# Patient Record
Sex: FEMALE | Race: WHITE | NOT HISPANIC OR LATINO | Employment: OTHER | URBAN - NONMETROPOLITAN AREA
[De-identification: names, ages, dates, MRNs, and addresses within clinical notes are randomized per-mention and may not be internally consistent; named-entity substitution may affect disease eponyms.]

---

## 2017-03-06 ENCOUNTER — TRANSCRIBE ORDERS (OUTPATIENT)
Dept: ADMINISTRATIVE | Facility: HOSPITAL | Age: 67
End: 2017-03-06

## 2017-03-06 DIAGNOSIS — Z12.31 ENCOUNTER FOR SCREENING MAMMOGRAM FOR MALIGNANT NEOPLASM OF BREAST: Primary | ICD-10-CM

## 2017-03-10 ENCOUNTER — HOSPITAL ENCOUNTER (OUTPATIENT)
Dept: MAMMOGRAPHY | Facility: HOSPITAL | Age: 67
Discharge: HOME | End: 2017-03-10
Admitting: INTERNAL MEDICINE

## 2017-03-10 DIAGNOSIS — Z12.31 ENCOUNTER FOR SCREENING MAMMOGRAM FOR MALIGNANT NEOPLASM OF BREAST: ICD-10-CM

## 2017-03-10 PROCEDURE — G0202 SCR MAMMO BI INCL CAD: HCPCS

## 2017-03-10 PROCEDURE — 77063 BREAST TOMOSYNTHESIS BI: CPT

## 2017-06-19 RX ORDER — HYDROCODONE BITARTRATE AND ACETAMINOPHEN 7.5; 325 MG/1; MG/1
TABLET ORAL
Refills: 0 | COMMUNITY
Start: 2017-05-19 | End: 2017-06-19 | Stop reason: SDUPTHER

## 2017-06-19 RX ORDER — HYDROCODONE BITARTRATE AND ACETAMINOPHEN 7.5; 325 MG/1; MG/1
1 TABLET ORAL 3 TIMES DAILY PRN
Qty: 90 TABLET | Refills: 0 | Status: SHIPPED | OUTPATIENT
Start: 2017-06-19 | End: 2017-07-18 | Stop reason: SDUPTHER

## 2017-06-19 RX ORDER — HYDROCODONE BITARTRATE AND ACETAMINOPHEN 7.5; 325 MG/1; MG/1
1 TABLET ORAL 3 TIMES DAILY PRN
Qty: 90 TABLET | Refills: 0 | Status: SHIPPED | OUTPATIENT
Start: 2017-06-19 | End: 2017-06-19 | Stop reason: SDUPTHER

## 2017-07-18 RX ORDER — HYDROCODONE BITARTRATE AND ACETAMINOPHEN 7.5; 325 MG/1; MG/1
1 TABLET ORAL 3 TIMES DAILY PRN
Qty: 90 TABLET | Refills: 0 | Status: SHIPPED | OUTPATIENT
Start: 2017-07-18 | End: 2017-08-18 | Stop reason: SDUPTHER

## 2017-08-18 RX ORDER — HYDROCODONE BITARTRATE AND ACETAMINOPHEN 7.5; 325 MG/1; MG/1
1 TABLET ORAL 3 TIMES DAILY PRN
Qty: 90 TABLET | Refills: 0 | Status: SHIPPED | OUTPATIENT
Start: 2017-08-18 | End: 2017-09-19 | Stop reason: SDUPTHER

## 2017-09-05 ENCOUNTER — TELEPHONE (OUTPATIENT)
Dept: INTERNAL MEDICINE | Age: 67
End: 2017-09-05

## 2017-09-05 DIAGNOSIS — E78.2 MIXED HYPERLIPIDEMIA: Primary | ICD-10-CM

## 2017-09-05 DIAGNOSIS — Z11.59 ENCOUNTER FOR HEPATITIS C SCREENING TEST FOR LOW RISK PATIENT: ICD-10-CM

## 2017-09-06 DIAGNOSIS — Z11.59 ENCOUNTER FOR HEPATITIS C SCREENING TEST FOR LOW RISK PATIENT: ICD-10-CM

## 2017-09-06 DIAGNOSIS — E78.2 MIXED HYPERLIPIDEMIA: ICD-10-CM

## 2017-09-06 LAB
ALBUMIN SERPL-MCNC: 4.5 G/DL (ref 3.5–5.2)
ALP BLD-CCNC: 72 U/L (ref 35–104)
ALT SERPL-CCNC: 10 U/L (ref 5–33)
ANION GAP SERPL CALCULATED.3IONS-SCNC: 18 MMOL/L (ref 7–19)
AST SERPL-CCNC: 20 U/L (ref 5–32)
BILIRUB SERPL-MCNC: 0.4 MG/DL (ref 0.2–1.2)
BUN BLDV-MCNC: 11 MG/DL (ref 8–23)
CALCIUM SERPL-MCNC: 9.9 MG/DL (ref 8.8–10.2)
CHLORIDE BLD-SCNC: 96 MMOL/L (ref 98–111)
CHOLESTEROL, TOTAL: 202 MG/DL (ref 160–199)
CO2: 25 MMOL/L (ref 22–29)
CREAT SERPL-MCNC: 0.7 MG/DL (ref 0.5–0.9)
GFR NON-AFRICAN AMERICAN: >60
GLUCOSE BLD-MCNC: 88 MG/DL (ref 74–109)
HCT VFR BLD CALC: 33.5 % (ref 37–47)
HDLC SERPL-MCNC: 85 MG/DL (ref 65–121)
HEMOGLOBIN: 10.8 G/DL (ref 12–16)
HEPATITIS C ANTIBODY INTERPRETATION: NORMAL
LDL CHOLESTEROL CALCULATED: 98 MG/DL
MCH RBC QN AUTO: 29.3 PG (ref 27–31)
MCHC RBC AUTO-ENTMCNC: 32.2 G/DL (ref 33–37)
MCV RBC AUTO: 91 FL (ref 81–99)
PDW BLD-RTO: 13.6 % (ref 11.5–14.5)
PLATELET # BLD: 328 K/UL (ref 130–400)
PMV BLD AUTO: 10.3 FL (ref 9.4–12.3)
POTASSIUM SERPL-SCNC: 4.1 MMOL/L (ref 3.5–5)
RBC # BLD: 3.68 M/UL (ref 4.2–5.4)
SODIUM BLD-SCNC: 139 MMOL/L (ref 136–145)
TOTAL PROTEIN: 7.8 G/DL (ref 6.6–8.7)
TRIGL SERPL-MCNC: 96 MG/DL (ref 150–199)
TSH SERPL DL<=0.05 MIU/L-ACNC: 4 UIU/ML (ref 0.27–4.2)
WBC # BLD: 7.3 K/UL (ref 4.8–10.8)

## 2017-09-07 RX ORDER — ATORVASTATIN CALCIUM 40 MG/1
40 TABLET, FILM COATED ORAL DAILY
COMMUNITY
End: 2018-10-11 | Stop reason: SDUPTHER

## 2017-09-07 RX ORDER — HYDROXYZINE HYDROCHLORIDE 25 MG/1
25 TABLET, FILM COATED ORAL 3 TIMES DAILY PRN
COMMUNITY
End: 2020-02-18 | Stop reason: CLARIF

## 2017-09-07 RX ORDER — MECLIZINE HYDROCHLORIDE 25 MG/1
25 TABLET ORAL 3 TIMES DAILY PRN
COMMUNITY
End: 2022-05-30 | Stop reason: ALTCHOICE

## 2017-09-07 RX ORDER — CYANOCOBALAMIN 1000 UG/ML
1000 INJECTION, SOLUTION INTRAMUSCULAR; SUBCUTANEOUS
COMMUNITY
End: 2020-12-07

## 2017-09-07 RX ORDER — FOLIC ACID 1 MG/1
1 TABLET ORAL DAILY
COMMUNITY
End: 2018-05-08 | Stop reason: SDUPTHER

## 2017-09-07 RX ORDER — DEXLANSOPRAZOLE 60 MG/1
60 CAPSULE, DELAYED RELEASE ORAL DAILY
COMMUNITY
End: 2018-05-08 | Stop reason: SDUPTHER

## 2017-09-12 ENCOUNTER — OFFICE VISIT (OUTPATIENT)
Dept: INTERNAL MEDICINE | Age: 67
End: 2017-09-12
Payer: MEDICARE

## 2017-09-12 VITALS
SYSTOLIC BLOOD PRESSURE: 118 MMHG | HEART RATE: 85 BPM | OXYGEN SATURATION: 95 % | DIASTOLIC BLOOD PRESSURE: 70 MMHG | BODY MASS INDEX: 18.22 KG/M2 | WEIGHT: 99 LBS | HEIGHT: 62 IN

## 2017-09-12 DIAGNOSIS — J41.0 SIMPLE CHRONIC BRONCHITIS (HCC): ICD-10-CM

## 2017-09-12 DIAGNOSIS — F34.1 DYSTHYMIA: Primary | ICD-10-CM

## 2017-09-12 DIAGNOSIS — J30.89 NON-SEASONAL ALLERGIC RHINITIS, UNSPECIFIED ALLERGIC RHINITIS TRIGGER: ICD-10-CM

## 2017-09-12 DIAGNOSIS — D51.8 OTHER VITAMIN B12 DEFICIENCY ANEMIA: ICD-10-CM

## 2017-09-12 DIAGNOSIS — Z87.891 PERSONAL HISTORY OF TOBACCO USE, PRESENTING HAZARDS TO HEALTH: ICD-10-CM

## 2017-09-12 DIAGNOSIS — R53.82 CHRONIC FATIGUE: ICD-10-CM

## 2017-09-12 DIAGNOSIS — J98.01 COUGH DUE TO BRONCHOSPASM: ICD-10-CM

## 2017-09-12 PROBLEM — D51.9 ANEMIA DUE TO VITAMIN B12 DEFICIENCY: Status: ACTIVE | Noted: 2017-09-12

## 2017-09-12 PROCEDURE — 3023F SPIROM DOC REV: CPT | Performed by: INTERNAL MEDICINE

## 2017-09-12 PROCEDURE — 1090F PRES/ABSN URINE INCON ASSESS: CPT | Performed by: INTERNAL MEDICINE

## 2017-09-12 PROCEDURE — 1036F TOBACCO NON-USER: CPT | Performed by: INTERNAL MEDICINE

## 2017-09-12 PROCEDURE — 99214 OFFICE O/P EST MOD 30 MIN: CPT | Performed by: INTERNAL MEDICINE

## 2017-09-12 PROCEDURE — G8427 DOCREV CUR MEDS BY ELIG CLIN: HCPCS | Performed by: INTERNAL MEDICINE

## 2017-09-12 PROCEDURE — 96372 THER/PROPH/DIAG INJ SC/IM: CPT | Performed by: INTERNAL MEDICINE

## 2017-09-12 PROCEDURE — G8400 PT W/DXA NO RESULTS DOC: HCPCS | Performed by: INTERNAL MEDICINE

## 2017-09-12 PROCEDURE — 3014F SCREEN MAMMO DOC REV: CPT | Performed by: INTERNAL MEDICINE

## 2017-09-12 PROCEDURE — 3017F COLORECTAL CA SCREEN DOC REV: CPT | Performed by: INTERNAL MEDICINE

## 2017-09-12 PROCEDURE — G8419 CALC BMI OUT NRM PARAM NOF/U: HCPCS | Performed by: INTERNAL MEDICINE

## 2017-09-12 PROCEDURE — 1123F ACP DISCUSS/DSCN MKR DOCD: CPT | Performed by: INTERNAL MEDICINE

## 2017-09-12 PROCEDURE — 4040F PNEUMOC VAC/ADMIN/RCVD: CPT | Performed by: INTERNAL MEDICINE

## 2017-09-12 PROCEDURE — G8598 ASA/ANTIPLAT THER USED: HCPCS | Performed by: INTERNAL MEDICINE

## 2017-09-12 PROCEDURE — G8926 SPIRO NO PERF OR DOC: HCPCS | Performed by: INTERNAL MEDICINE

## 2017-09-12 RX ORDER — METHYLPREDNISOLONE ACETATE 80 MG/ML
80 INJECTION, SUSPENSION INTRA-ARTICULAR; INTRALESIONAL; INTRAMUSCULAR; SOFT TISSUE ONCE
Status: COMPLETED | OUTPATIENT
Start: 2017-09-12 | End: 2017-09-12

## 2017-09-12 RX ORDER — CYANOCOBALAMIN 1000 UG/ML
1000 INJECTION INTRAMUSCULAR; SUBCUTANEOUS ONCE
Status: COMPLETED | OUTPATIENT
Start: 2017-09-12 | End: 2017-09-12

## 2017-09-12 RX ORDER — FLUTICASONE PROPIONATE 50 MCG
1 SPRAY, SUSPENSION (ML) NASAL DAILY
COMMUNITY
End: 2018-01-18 | Stop reason: SDUPTHER

## 2017-09-12 RX ORDER — LEVOCETIRIZINE DIHYDROCHLORIDE 5 MG/1
5 TABLET, FILM COATED ORAL NIGHTLY
Qty: 30 TABLET | Refills: 5 | Status: SHIPPED | OUTPATIENT
Start: 2017-09-12 | End: 2018-05-08 | Stop reason: SDUPTHER

## 2017-09-12 RX ORDER — SERTRALINE HYDROCHLORIDE 25 MG/1
25 TABLET, FILM COATED ORAL DAILY
Qty: 30 TABLET | Refills: 3 | Status: SHIPPED | OUTPATIENT
Start: 2017-09-12 | End: 2018-02-21 | Stop reason: SDUPTHER

## 2017-09-12 RX ORDER — ALBUTEROL SULFATE 90 UG/1
2 AEROSOL, METERED RESPIRATORY (INHALATION) EVERY 6 HOURS PRN
COMMUNITY
End: 2017-09-12

## 2017-09-12 ASSESSMENT — PATIENT HEALTH QUESTIONNAIRE - PHQ9
SUM OF ALL RESPONSES TO PHQ QUESTIONS 1-9: 2
SUM OF ALL RESPONSES TO PHQ9 QUESTIONS 1 & 2: 2
1. LITTLE INTEREST OR PLEASURE IN DOING THINGS: 1
2. FEELING DOWN, DEPRESSED OR HOPELESS: 1

## 2017-09-19 RX ORDER — HYDROCODONE BITARTRATE AND ACETAMINOPHEN 7.5; 325 MG/1; MG/1
1 TABLET ORAL 3 TIMES DAILY PRN
Qty: 90 TABLET | Refills: 0 | Status: SHIPPED | OUTPATIENT
Start: 2017-09-19 | End: 2017-10-16 | Stop reason: SDUPTHER

## 2017-09-20 ASSESSMENT — ENCOUNTER SYMPTOMS
ABDOMINAL PAIN: 0
WHEEZING: 0
COUGH: 1
SINUS PRESSURE: 1
EYE REDNESS: 0
SHORTNESS OF BREATH: 0
RHINORRHEA: 1

## 2017-10-13 RX ORDER — HYDROCODONE BITARTRATE AND ACETAMINOPHEN 7.5; 325 MG/1; MG/1
1 TABLET ORAL 3 TIMES DAILY PRN
Qty: 90 TABLET | Refills: 0 | OUTPATIENT
Start: 2017-10-13

## 2017-10-16 RX ORDER — HYDROCODONE BITARTRATE AND ACETAMINOPHEN 7.5; 325 MG/1; MG/1
1 TABLET ORAL 3 TIMES DAILY PRN
Qty: 90 TABLET | Refills: 0 | Status: SHIPPED | OUTPATIENT
Start: 2017-10-16 | End: 2017-11-20 | Stop reason: SDUPTHER

## 2017-10-16 NOTE — TELEPHONE ENCOUNTER
Requested Prescriptions     Pending Prescriptions Disp Refills    HYDROcodone-acetaminophen (NORCO) 7.5-325 MG per tablet 90 tablet 0     Sig: Take 1 tablet by mouth 3 times daily as needed for Pain .

## 2017-11-20 RX ORDER — HYDROCODONE BITARTRATE AND ACETAMINOPHEN 7.5; 325 MG/1; MG/1
TABLET ORAL
Qty: 90 TABLET | Refills: 0 | Status: SHIPPED | OUTPATIENT
Start: 2017-11-20 | End: 2017-12-18 | Stop reason: SDUPTHER

## 2017-12-18 RX ORDER — HYDROCODONE BITARTRATE AND ACETAMINOPHEN 7.5; 325 MG/1; MG/1
1 TABLET ORAL 3 TIMES DAILY PRN
Qty: 90 TABLET | Refills: 0 | Status: SHIPPED | OUTPATIENT
Start: 2017-12-18 | End: 2018-01-18 | Stop reason: SDUPTHER

## 2018-01-18 RX ORDER — HYDROCODONE BITARTRATE AND ACETAMINOPHEN 7.5; 325 MG/1; MG/1
1 TABLET ORAL 3 TIMES DAILY PRN
Qty: 90 TABLET | Refills: 0 | Status: SHIPPED | OUTPATIENT
Start: 2018-01-18 | End: 2018-02-17

## 2018-01-18 RX ORDER — FLUTICASONE PROPIONATE 50 MCG
SPRAY, SUSPENSION (ML) NASAL
Qty: 16 G | Refills: 5 | Status: SHIPPED | OUTPATIENT
Start: 2018-01-18 | End: 2018-12-06 | Stop reason: SDUPTHER

## 2018-02-19 RX ORDER — HYDROCODONE BITARTRATE AND ACETAMINOPHEN 7.5; 325 MG/1; MG/1
TABLET ORAL
Qty: 30 TABLET | Refills: 0 | Status: SHIPPED | OUTPATIENT
Start: 2018-02-19 | End: 2018-03-12 | Stop reason: SDUPTHER

## 2018-02-21 DIAGNOSIS — F34.1 DYSTHYMIA: ICD-10-CM

## 2018-02-21 DIAGNOSIS — J41.0 SIMPLE CHRONIC BRONCHITIS (HCC): ICD-10-CM

## 2018-02-22 RX ORDER — IPRATROPIUM/ALBUTEROL SULFATE 20-100 MCG
MIST INHALER (GRAM) INHALATION
Qty: 4 G | Refills: 3 | Status: SHIPPED | OUTPATIENT
Start: 2018-02-22 | End: 2018-06-25 | Stop reason: SDUPTHER

## 2018-02-22 RX ORDER — SERTRALINE HYDROCHLORIDE 25 MG/1
TABLET, FILM COATED ORAL
Qty: 90 TABLET | Refills: 3 | Status: SHIPPED | OUTPATIENT
Start: 2018-02-22 | End: 2019-03-26 | Stop reason: SDUPTHER

## 2018-02-23 ENCOUNTER — TELEPHONE (OUTPATIENT)
Dept: INTERNAL MEDICINE | Age: 68
End: 2018-02-23

## 2018-02-23 NOTE — TELEPHONE ENCOUNTER
The phone # we have is not a working number. I sent her a hand written letter asking her to let us know when needs new script and to try taking bid instead of tid.

## 2018-03-01 ENCOUNTER — TELEPHONE (OUTPATIENT)
Dept: INTERNAL MEDICINE | Age: 68
End: 2018-03-01

## 2018-03-01 NOTE — TELEPHONE ENCOUNTER
She is using Combivent inhaler and it works really good, but is too expensive. Is there something cheaper she can try?

## 2018-03-12 RX ORDER — HYDROCODONE BITARTRATE AND ACETAMINOPHEN 7.5; 325 MG/1; MG/1
1 TABLET ORAL 2 TIMES DAILY PRN
Qty: 60 TABLET | Refills: 0 | Status: SHIPPED | OUTPATIENT
Start: 2018-03-12 | End: 2018-04-11

## 2018-03-12 NOTE — TELEPHONE ENCOUNTER
Requested Prescriptions     Pending Prescriptions Disp Refills    HYDROcodone-acetaminophen (NORCO) 7.5-325 MG per tablet 60 tablet 0     Sig: Take 1 tablet by mouth 2 times daily as needed (prn) Take 1 q day prn.

## 2018-03-27 ENCOUNTER — OFFICE VISIT (OUTPATIENT)
Dept: INTERNAL MEDICINE | Age: 68
End: 2018-03-27
Payer: MEDICARE

## 2018-03-27 ENCOUNTER — TRANSCRIBE ORDERS (OUTPATIENT)
Dept: ADMINISTRATIVE | Facility: HOSPITAL | Age: 68
End: 2018-03-27

## 2018-03-27 VITALS
OXYGEN SATURATION: 95 % | BODY MASS INDEX: 18.4 KG/M2 | HEIGHT: 62 IN | RESPIRATION RATE: 18 BRPM | WEIGHT: 100 LBS | SYSTOLIC BLOOD PRESSURE: 120 MMHG | DIASTOLIC BLOOD PRESSURE: 62 MMHG | HEART RATE: 74 BPM

## 2018-03-27 DIAGNOSIS — D51.8 OTHER VITAMIN B12 DEFICIENCY ANEMIA: ICD-10-CM

## 2018-03-27 DIAGNOSIS — R53.82 CHRONIC FATIGUE: ICD-10-CM

## 2018-03-27 DIAGNOSIS — J30.89 CHRONIC NON-SEASONAL ALLERGIC RHINITIS, UNSPECIFIED TRIGGER: ICD-10-CM

## 2018-03-27 DIAGNOSIS — E78.00 PURE HYPERCHOLESTEROLEMIA: ICD-10-CM

## 2018-03-27 DIAGNOSIS — M15.9 PRIMARY OSTEOARTHRITIS INVOLVING MULTIPLE JOINTS: ICD-10-CM

## 2018-03-27 DIAGNOSIS — Z12.31 SCREENING MAMMOGRAM, ENCOUNTER FOR: ICD-10-CM

## 2018-03-27 DIAGNOSIS — Z79.899 LONG-TERM USE OF HIGH-RISK MEDICATION: ICD-10-CM

## 2018-03-27 DIAGNOSIS — Z12.31 ENCOUNTER FOR SCREENING MAMMOGRAM FOR MALIGNANT NEOPLASM OF BREAST: Primary | ICD-10-CM

## 2018-03-27 DIAGNOSIS — F34.1 DYSTHYMIA: Primary | ICD-10-CM

## 2018-03-27 DIAGNOSIS — Z00.00 ROUTINE GENERAL MEDICAL EXAMINATION AT A HEALTH CARE FACILITY: ICD-10-CM

## 2018-03-27 DIAGNOSIS — J41.1 MUCOPURULENT CHRONIC BRONCHITIS (HCC): ICD-10-CM

## 2018-03-27 LAB
AMPHETAMINE SCREEN, URINE: NORMAL
BARBITURATE SCREEN, URINE: NORMAL
BENZODIAZEPINE SCREEN, URINE: NORMAL
COCAINE METABOLITE SCREEN URINE: NORMAL
MDMA URINE: NORMAL
METHADONE SCREEN, URINE: NORMAL
METHAMPHETAMINE, URINE: NORMAL
OPIATE SCREEN URINE: POSITIVE
OXYCODONE SCREEN URINE: NORMAL
PHENCYCLIDINE SCREEN URINE: NORMAL
PROPOXYPHENE SCREEN, URINE: NORMAL
THC: NORMAL
TRICYCLIC ANTIDEPRESSANTS, UR: NORMAL

## 2018-03-27 PROCEDURE — G8400 PT W/DXA NO RESULTS DOC: HCPCS | Performed by: INTERNAL MEDICINE

## 2018-03-27 PROCEDURE — G8419 CALC BMI OUT NRM PARAM NOF/U: HCPCS | Performed by: INTERNAL MEDICINE

## 2018-03-27 PROCEDURE — G0439 PPPS, SUBSEQ VISIT: HCPCS | Performed by: INTERNAL MEDICINE

## 2018-03-27 PROCEDURE — 3023F SPIROM DOC REV: CPT | Performed by: INTERNAL MEDICINE

## 2018-03-27 PROCEDURE — 80305 DRUG TEST PRSMV DIR OPT OBS: CPT | Performed by: INTERNAL MEDICINE

## 2018-03-27 PROCEDURE — 1123F ACP DISCUSS/DSCN MKR DOCD: CPT | Performed by: INTERNAL MEDICINE

## 2018-03-27 PROCEDURE — 4040F PNEUMOC VAC/ADMIN/RCVD: CPT | Performed by: INTERNAL MEDICINE

## 2018-03-27 PROCEDURE — G8482 FLU IMMUNIZE ORDER/ADMIN: HCPCS | Performed by: INTERNAL MEDICINE

## 2018-03-27 PROCEDURE — G8926 SPIRO NO PERF OR DOC: HCPCS | Performed by: INTERNAL MEDICINE

## 2018-03-27 PROCEDURE — G8598 ASA/ANTIPLAT THER USED: HCPCS | Performed by: INTERNAL MEDICINE

## 2018-03-27 PROCEDURE — G8427 DOCREV CUR MEDS BY ELIG CLIN: HCPCS | Performed by: INTERNAL MEDICINE

## 2018-03-27 PROCEDURE — 3017F COLORECTAL CA SCREEN DOC REV: CPT | Performed by: INTERNAL MEDICINE

## 2018-03-27 PROCEDURE — 3014F SCREEN MAMMO DOC REV: CPT | Performed by: INTERNAL MEDICINE

## 2018-03-27 PROCEDURE — 1036F TOBACCO NON-USER: CPT | Performed by: INTERNAL MEDICINE

## 2018-03-27 PROCEDURE — 99213 OFFICE O/P EST LOW 20 MIN: CPT | Performed by: INTERNAL MEDICINE

## 2018-03-27 PROCEDURE — 1090F PRES/ABSN URINE INCON ASSESS: CPT | Performed by: INTERNAL MEDICINE

## 2018-03-27 PROCEDURE — 96372 THER/PROPH/DIAG INJ SC/IM: CPT | Performed by: INTERNAL MEDICINE

## 2018-03-27 RX ORDER — ALBUTEROL SULFATE 90 UG/1
2 AEROSOL, METERED RESPIRATORY (INHALATION) EVERY 6 HOURS PRN
Qty: 3 INHALER | Refills: 3 | Status: SHIPPED | OUTPATIENT
Start: 2018-03-27 | End: 2019-02-08 | Stop reason: SDUPTHER

## 2018-03-27 RX ORDER — CYANOCOBALAMIN 1000 UG/ML
1000 INJECTION INTRAMUSCULAR; SUBCUTANEOUS ONCE
Status: COMPLETED | OUTPATIENT
Start: 2018-03-27 | End: 2018-03-27

## 2018-03-27 RX ORDER — MONTELUKAST SODIUM 10 MG/1
10 TABLET ORAL NIGHTLY
Qty: 90 TABLET | Refills: 3 | Status: SHIPPED | OUTPATIENT
Start: 2018-03-27 | End: 2021-06-23 | Stop reason: ALTCHOICE

## 2018-03-27 RX ORDER — METHYLPREDNISOLONE ACETATE 40 MG/ML
40 INJECTION, SUSPENSION INTRA-ARTICULAR; INTRALESIONAL; INTRAMUSCULAR; SOFT TISSUE ONCE
Status: COMPLETED | OUTPATIENT
Start: 2018-03-27 | End: 2018-03-27

## 2018-03-27 RX ADMIN — CYANOCOBALAMIN 1000 MCG: 1000 INJECTION INTRAMUSCULAR; SUBCUTANEOUS at 11:34

## 2018-03-27 RX ADMIN — METHYLPREDNISOLONE ACETATE 40 MG: 40 INJECTION, SUSPENSION INTRA-ARTICULAR; INTRALESIONAL; INTRAMUSCULAR; SOFT TISSUE at 11:32

## 2018-03-27 ASSESSMENT — LIFESTYLE VARIABLES: HOW OFTEN DO YOU HAVE A DRINK CONTAINING ALCOHOL: 0
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
## 2018-03-30 ASSESSMENT — ENCOUNTER SYMPTOMS
ABDOMINAL PAIN: 0
BACK PAIN: 0
EYE REDNESS: 0
SHORTNESS OF BREATH: 0
COUGH: 1
EYE DISCHARGE: 0
ABDOMINAL DISTENTION: 0
SINUS PRESSURE: 0

## 2018-04-19 RX ORDER — HYDROCODONE BITARTRATE AND ACETAMINOPHEN 7.5; 325 MG/1; MG/1
1 TABLET ORAL EVERY 8 HOURS PRN
COMMUNITY
End: 2018-04-19 | Stop reason: SDUPTHER

## 2018-04-19 RX ORDER — HYDROCODONE BITARTRATE AND ACETAMINOPHEN 7.5; 325 MG/1; MG/1
1 TABLET ORAL EVERY 8 HOURS PRN
Qty: 90 TABLET | Refills: 0 | Status: SHIPPED | OUTPATIENT
Start: 2018-04-19 | End: 2018-06-06 | Stop reason: SDUPTHER

## 2018-04-23 ENCOUNTER — TELEPHONE (OUTPATIENT)
Dept: INTERNAL MEDICINE | Age: 68
End: 2018-04-23

## 2018-04-23 DIAGNOSIS — J34.89 NASAL LESION: ICD-10-CM

## 2018-04-23 RX ORDER — MUPIROCIN CALCIUM 20 MG/G
CREAM TOPICAL
Qty: 15 G | Refills: 0 | Status: SHIPPED | OUTPATIENT
Start: 2018-04-23 | End: 2018-05-23

## 2018-04-26 PROBLEM — Z12.31 SCREENING MAMMOGRAM, ENCOUNTER FOR: Status: RESOLVED | Noted: 2018-03-27 | Resolved: 2018-04-26

## 2018-05-01 ENCOUNTER — APPOINTMENT (OUTPATIENT)
Dept: MAMMOGRAPHY | Facility: HOSPITAL | Age: 68
End: 2018-05-01

## 2018-05-08 ENCOUNTER — HOSPITAL ENCOUNTER (OUTPATIENT)
Dept: MAMMOGRAPHY | Facility: HOSPITAL | Age: 68
Discharge: HOME | End: 2018-05-08
Admitting: INTERNAL MEDICINE

## 2018-05-08 DIAGNOSIS — Z12.31 ENCOUNTER FOR SCREENING MAMMOGRAM FOR MALIGNANT NEOPLASM OF BREAST: ICD-10-CM

## 2018-05-08 DIAGNOSIS — J30.89 NON-SEASONAL ALLERGIC RHINITIS: ICD-10-CM

## 2018-05-08 PROCEDURE — 77067 SCR MAMMO BI INCL CAD: CPT

## 2018-05-08 PROCEDURE — 77063 BREAST TOMOSYNTHESIS BI: CPT

## 2018-05-08 RX ORDER — DEXLANSOPRAZOLE 60 MG/1
CAPSULE, DELAYED RELEASE ORAL
Qty: 90 CAPSULE | Refills: 3 | Status: SHIPPED | OUTPATIENT
Start: 2018-05-08 | End: 2019-06-06 | Stop reason: SDUPTHER

## 2018-05-08 RX ORDER — LEVOCETIRIZINE DIHYDROCHLORIDE 5 MG/1
TABLET, FILM COATED ORAL
Qty: 90 TABLET | Refills: 3 | Status: SHIPPED | OUTPATIENT
Start: 2018-05-08 | End: 2019-05-20 | Stop reason: SDUPTHER

## 2018-05-08 RX ORDER — FOLIC ACID 1 MG/1
TABLET ORAL
Qty: 90 TABLET | Refills: 3 | Status: SHIPPED | OUTPATIENT
Start: 2018-05-08

## 2018-05-09 DIAGNOSIS — R53.82 CHRONIC FATIGUE: ICD-10-CM

## 2018-05-09 DIAGNOSIS — E03.9 ACQUIRED HYPOTHYROIDISM: ICD-10-CM

## 2018-05-09 DIAGNOSIS — D51.8 OTHER VITAMIN B12 DEFICIENCY ANEMIA: ICD-10-CM

## 2018-05-09 LAB
ALBUMIN SERPL-MCNC: 4.1 G/DL (ref 3.5–5.2)
ALP BLD-CCNC: 73 U/L (ref 35–104)
ALT SERPL-CCNC: 9 U/L (ref 5–33)
ANION GAP SERPL CALCULATED.3IONS-SCNC: 14 MMOL/L (ref 7–19)
AST SERPL-CCNC: 18 U/L (ref 5–32)
BILIRUB SERPL-MCNC: <0.2 MG/DL (ref 0.2–1.2)
BUN BLDV-MCNC: 12 MG/DL (ref 8–23)
CALCIUM SERPL-MCNC: 9.5 MG/DL (ref 8.8–10.2)
CHLORIDE BLD-SCNC: 101 MMOL/L (ref 98–111)
CO2: 27 MMOL/L (ref 22–29)
CREAT SERPL-MCNC: 0.9 MG/DL (ref 0.5–0.9)
FOLATE: >20 NG/ML (ref 4.8–37.3)
GFR NON-AFRICAN AMERICAN: >60
GLUCOSE BLD-MCNC: 91 MG/DL (ref 74–109)
HCT VFR BLD CALC: 32.2 % (ref 37–47)
HEMOGLOBIN: 9.9 G/DL (ref 12–16)
IRON: 65 UG/DL (ref 37–145)
MCH RBC QN AUTO: 27.4 PG (ref 27–31)
MCHC RBC AUTO-ENTMCNC: 30.7 G/DL (ref 33–37)
MCV RBC AUTO: 89.2 FL (ref 81–99)
PDW BLD-RTO: 14.3 % (ref 11.5–14.5)
PLATELET # BLD: 392 K/UL (ref 130–400)
PMV BLD AUTO: 9.7 FL (ref 9.4–12.3)
POTASSIUM SERPL-SCNC: 5.1 MMOL/L (ref 3.5–5)
RBC # BLD: 3.61 M/UL (ref 4.2–5.4)
SODIUM BLD-SCNC: 142 MMOL/L (ref 136–145)
TOTAL PROTEIN: 6.9 G/DL (ref 6.6–8.7)
TSH SERPL DL<=0.05 MIU/L-ACNC: 12.44 UIU/ML (ref 0.27–4.2)
VITAMIN B-12: 638 PG/ML (ref 211–946)
WBC # BLD: 6.6 K/UL (ref 4.8–10.8)

## 2018-05-09 RX ORDER — LEVOTHYROXINE SODIUM 0.03 MG/1
25 TABLET ORAL DAILY
Qty: 30 TABLET | Refills: 3 | Status: SHIPPED | OUTPATIENT
Start: 2018-05-09 | End: 2018-09-07 | Stop reason: SDUPTHER

## 2018-06-06 DIAGNOSIS — M15.9 PRIMARY OSTEOARTHRITIS INVOLVING MULTIPLE JOINTS: Primary | ICD-10-CM

## 2018-06-07 RX ORDER — HYDROCODONE BITARTRATE AND ACETAMINOPHEN 7.5; 325 MG/1; MG/1
1 TABLET ORAL EVERY 8 HOURS PRN
Qty: 90 TABLET | Refills: 0 | Status: SHIPPED | OUTPATIENT
Start: 2018-06-07 | End: 2018-08-09 | Stop reason: SDUPTHER

## 2018-06-18 DIAGNOSIS — E03.9 ACQUIRED HYPOTHYROIDISM: ICD-10-CM

## 2018-06-18 LAB
ALBUMIN SERPL-MCNC: 4.2 G/DL (ref 3.5–5.2)
ALP BLD-CCNC: 78 U/L (ref 35–104)
ALT SERPL-CCNC: 6 U/L (ref 5–33)
ANION GAP SERPL CALCULATED.3IONS-SCNC: 14 MMOL/L (ref 7–19)
AST SERPL-CCNC: 17 U/L (ref 5–32)
BILIRUB SERPL-MCNC: <0.2 MG/DL (ref 0.2–1.2)
BUN BLDV-MCNC: 8 MG/DL (ref 8–23)
CALCIUM SERPL-MCNC: 9.6 MG/DL (ref 8.8–10.2)
CHLORIDE BLD-SCNC: 97 MMOL/L (ref 98–111)
CO2: 27 MMOL/L (ref 22–29)
CREAT SERPL-MCNC: 0.8 MG/DL (ref 0.5–0.9)
GFR NON-AFRICAN AMERICAN: >60
GLUCOSE BLD-MCNC: 97 MG/DL (ref 74–109)
HCT VFR BLD CALC: 33.4 % (ref 37–47)
HEMOGLOBIN: 10.5 G/DL (ref 12–16)
MCH RBC QN AUTO: 27.8 PG (ref 27–31)
MCHC RBC AUTO-ENTMCNC: 31.4 G/DL (ref 33–37)
MCV RBC AUTO: 88.4 FL (ref 81–99)
PDW BLD-RTO: 13.9 % (ref 11.5–14.5)
PLATELET # BLD: 408 K/UL (ref 130–400)
PMV BLD AUTO: 10.5 FL (ref 9.4–12.3)
POTASSIUM SERPL-SCNC: 3.8 MMOL/L (ref 3.5–5)
RBC # BLD: 3.78 M/UL (ref 4.2–5.4)
SODIUM BLD-SCNC: 138 MMOL/L (ref 136–145)
TOTAL PROTEIN: 7.4 G/DL (ref 6.6–8.7)
TSH SERPL DL<=0.05 MIU/L-ACNC: 0.97 UIU/ML (ref 0.27–4.2)
WBC # BLD: 6.2 K/UL (ref 4.8–10.8)

## 2018-06-25 DIAGNOSIS — J41.0 SIMPLE CHRONIC BRONCHITIS (HCC): ICD-10-CM

## 2018-06-25 RX ORDER — IPRATROPIUM/ALBUTEROL SULFATE 20-100 MCG
MIST INHALER (GRAM) INHALATION
Qty: 4 G | Refills: 3 | Status: SHIPPED | OUTPATIENT
Start: 2018-06-25 | End: 2020-12-07

## 2018-06-26 ENCOUNTER — OFFICE VISIT (OUTPATIENT)
Dept: INTERNAL MEDICINE | Age: 68
End: 2018-06-26
Payer: MEDICARE

## 2018-06-26 VITALS
BODY MASS INDEX: 18.03 KG/M2 | DIASTOLIC BLOOD PRESSURE: 72 MMHG | WEIGHT: 98 LBS | HEIGHT: 62 IN | SYSTOLIC BLOOD PRESSURE: 128 MMHG | RESPIRATION RATE: 20 BRPM | OXYGEN SATURATION: 94 % | HEART RATE: 77 BPM

## 2018-06-26 DIAGNOSIS — E03.9 ACQUIRED HYPOTHYROIDISM: ICD-10-CM

## 2018-06-26 DIAGNOSIS — Z87.891 PERSONAL HISTORY OF TOBACCO USE: ICD-10-CM

## 2018-06-26 DIAGNOSIS — Z13.31 POSITIVE DEPRESSION SCREENING: ICD-10-CM

## 2018-06-26 DIAGNOSIS — M47.22 OSTEOARTHRITIS OF SPINE WITH RADICULOPATHY, CERVICAL REGION: Primary | ICD-10-CM

## 2018-06-26 DIAGNOSIS — M15.9 PRIMARY OSTEOARTHRITIS INVOLVING MULTIPLE JOINTS: ICD-10-CM

## 2018-06-26 DIAGNOSIS — J41.1 MUCOPURULENT CHRONIC BRONCHITIS (HCC): ICD-10-CM

## 2018-06-26 DIAGNOSIS — D51.8 OTHER VITAMIN B12 DEFICIENCY ANEMIA: ICD-10-CM

## 2018-06-26 LAB

## 2018-06-26 PROCEDURE — 96372 THER/PROPH/DIAG INJ SC/IM: CPT | Performed by: INTERNAL MEDICINE

## 2018-06-26 PROCEDURE — G8926 SPIRO NO PERF OR DOC: HCPCS | Performed by: INTERNAL MEDICINE

## 2018-06-26 PROCEDURE — 1090F PRES/ABSN URINE INCON ASSESS: CPT | Performed by: INTERNAL MEDICINE

## 2018-06-26 PROCEDURE — 4040F PNEUMOC VAC/ADMIN/RCVD: CPT | Performed by: INTERNAL MEDICINE

## 2018-06-26 PROCEDURE — 1036F TOBACCO NON-USER: CPT | Performed by: INTERNAL MEDICINE

## 2018-06-26 PROCEDURE — 99214 OFFICE O/P EST MOD 30 MIN: CPT | Performed by: INTERNAL MEDICINE

## 2018-06-26 PROCEDURE — G8419 CALC BMI OUT NRM PARAM NOF/U: HCPCS | Performed by: INTERNAL MEDICINE

## 2018-06-26 PROCEDURE — 3023F SPIROM DOC REV: CPT | Performed by: INTERNAL MEDICINE

## 2018-06-26 PROCEDURE — 3017F COLORECTAL CA SCREEN DOC REV: CPT | Performed by: INTERNAL MEDICINE

## 2018-06-26 PROCEDURE — G8427 DOCREV CUR MEDS BY ELIG CLIN: HCPCS | Performed by: INTERNAL MEDICINE

## 2018-06-26 PROCEDURE — G8431 POS CLIN DEPRES SCRN F/U DOC: HCPCS | Performed by: INTERNAL MEDICINE

## 2018-06-26 PROCEDURE — G0296 VISIT TO DETERM LDCT ELIG: HCPCS | Performed by: INTERNAL MEDICINE

## 2018-06-26 PROCEDURE — G8400 PT W/DXA NO RESULTS DOC: HCPCS | Performed by: INTERNAL MEDICINE

## 2018-06-26 PROCEDURE — 1123F ACP DISCUSS/DSCN MKR DOCD: CPT | Performed by: INTERNAL MEDICINE

## 2018-06-26 PROCEDURE — G8598 ASA/ANTIPLAT THER USED: HCPCS | Performed by: INTERNAL MEDICINE

## 2018-06-26 RX ORDER — CYANOCOBALAMIN 1000 UG/ML
1000 INJECTION INTRAMUSCULAR; SUBCUTANEOUS ONCE
Status: COMPLETED | OUTPATIENT
Start: 2018-06-26 | End: 2018-06-26

## 2018-06-26 RX ADMIN — CYANOCOBALAMIN 1000 MCG: 1000 INJECTION INTRAMUSCULAR; SUBCUTANEOUS at 13:01

## 2018-06-30 ASSESSMENT — ENCOUNTER SYMPTOMS
ABDOMINAL PAIN: 0
COUGH: 1
SINUS PRESSURE: 0
EYE REDNESS: 0
BACK PAIN: 0
SHORTNESS OF BREATH: 1
ABDOMINAL DISTENTION: 0
EYE DISCHARGE: 0

## 2018-08-09 DIAGNOSIS — M15.9 PRIMARY OSTEOARTHRITIS INVOLVING MULTIPLE JOINTS: ICD-10-CM

## 2018-08-09 RX ORDER — HYDROCODONE BITARTRATE AND ACETAMINOPHEN 7.5; 325 MG/1; MG/1
1 TABLET ORAL EVERY 8 HOURS PRN
Qty: 90 TABLET | Refills: 0 | Status: SHIPPED | OUTPATIENT
Start: 2018-08-09 | End: 2018-09-10 | Stop reason: SDUPTHER

## 2018-08-09 NOTE — TELEPHONE ENCOUNTER
Requested Prescriptions     Pending Prescriptions Disp Refills    HYDROcodone-acetaminophen (NORCO) 7.5-325 MG per tablet 90 tablet 0     Sig: Take 1 tablet by mouth every 8 hours as needed for Pain for up to 30 days. Mary Kay Frausto

## 2018-09-07 DIAGNOSIS — E03.9 ACQUIRED HYPOTHYROIDISM: ICD-10-CM

## 2018-09-07 RX ORDER — LEVOTHYROXINE SODIUM 0.03 MG/1
TABLET ORAL
Qty: 30 TABLET | Refills: 3 | Status: SHIPPED | OUTPATIENT
Start: 2018-09-07 | End: 2019-01-10 | Stop reason: SDUPTHER

## 2018-09-10 DIAGNOSIS — M15.9 PRIMARY OSTEOARTHRITIS INVOLVING MULTIPLE JOINTS: ICD-10-CM

## 2018-09-10 RX ORDER — HYDROCODONE BITARTRATE AND ACETAMINOPHEN 7.5; 325 MG/1; MG/1
1 TABLET ORAL EVERY 8 HOURS PRN
Qty: 90 TABLET | Refills: 0 | Status: SHIPPED | OUTPATIENT
Start: 2018-09-10 | End: 2018-10-25 | Stop reason: SDUPTHER

## 2018-09-26 PROBLEM — Z11.59 ENCOUNTER FOR HEPATITIS C SCREENING TEST FOR LOW RISK PATIENT: Status: RESOLVED | Noted: 2017-09-05 | Resolved: 2018-09-26

## 2018-10-01 ENCOUNTER — OFFICE VISIT (OUTPATIENT)
Dept: INTERNAL MEDICINE | Age: 68
End: 2018-10-01
Payer: MEDICARE

## 2018-10-01 VITALS
HEART RATE: 73 BPM | DIASTOLIC BLOOD PRESSURE: 70 MMHG | HEIGHT: 62 IN | OXYGEN SATURATION: 95 % | SYSTOLIC BLOOD PRESSURE: 130 MMHG | BODY MASS INDEX: 17.3 KG/M2 | WEIGHT: 94 LBS

## 2018-10-01 DIAGNOSIS — J41.1 MUCOPURULENT CHRONIC BRONCHITIS (HCC): ICD-10-CM

## 2018-10-01 DIAGNOSIS — D51.8 OTHER VITAMIN B12 DEFICIENCY ANEMIA: Primary | ICD-10-CM

## 2018-10-01 DIAGNOSIS — Z23 NEEDS FLU SHOT: ICD-10-CM

## 2018-10-01 DIAGNOSIS — E78.00 PURE HYPERCHOLESTEROLEMIA: ICD-10-CM

## 2018-10-01 DIAGNOSIS — G89.29 CHRONIC BILATERAL LOW BACK PAIN WITHOUT SCIATICA: ICD-10-CM

## 2018-10-01 DIAGNOSIS — D50.9 IRON DEFICIENCY ANEMIA, UNSPECIFIED IRON DEFICIENCY ANEMIA TYPE: ICD-10-CM

## 2018-10-01 DIAGNOSIS — M54.50 CHRONIC BILATERAL LOW BACK PAIN WITHOUT SCIATICA: ICD-10-CM

## 2018-10-01 LAB
ALBUMIN SERPL-MCNC: 4.5 G/DL (ref 3.5–5.2)
ALP BLD-CCNC: 83 U/L (ref 35–104)
ALT SERPL-CCNC: 6 U/L (ref 5–33)
ANION GAP SERPL CALCULATED.3IONS-SCNC: 13 MMOL/L (ref 7–19)
AST SERPL-CCNC: 20 U/L (ref 5–32)
BILIRUB SERPL-MCNC: <0.2 MG/DL (ref 0.2–1.2)
BUN BLDV-MCNC: 7 MG/DL (ref 8–23)
CALCIUM SERPL-MCNC: 9.9 MG/DL (ref 8.8–10.2)
CHLORIDE BLD-SCNC: 101 MMOL/L (ref 98–111)
CHOLESTEROL, TOTAL: 275 MG/DL (ref 160–199)
CO2: 29 MMOL/L (ref 22–29)
CREAT SERPL-MCNC: 0.8 MG/DL (ref 0.5–0.9)
GFR NON-AFRICAN AMERICAN: >60
GLUCOSE BLD-MCNC: 100 MG/DL (ref 74–109)
HCT VFR BLD CALC: 35.8 % (ref 37–47)
HDLC SERPL-MCNC: 68 MG/DL (ref 65–121)
HEMOGLOBIN: 11.1 G/DL (ref 12–16)
LDL CHOLESTEROL CALCULATED: 183 MG/DL
MCH RBC QN AUTO: 27.1 PG (ref 27–31)
MCHC RBC AUTO-ENTMCNC: 31 G/DL (ref 33–37)
MCV RBC AUTO: 87.3 FL (ref 81–99)
PDW BLD-RTO: 13.4 % (ref 11.5–14.5)
PLATELET # BLD: 395 K/UL (ref 130–400)
PMV BLD AUTO: 10.1 FL (ref 9.4–12.3)
POTASSIUM SERPL-SCNC: 4.5 MMOL/L (ref 3.5–5)
RBC # BLD: 4.1 M/UL (ref 4.2–5.4)
SODIUM BLD-SCNC: 143 MMOL/L (ref 136–145)
TOTAL PROTEIN: 7.7 G/DL (ref 6.6–8.7)
TRIGL SERPL-MCNC: 121 MG/DL (ref 0–149)
TSH SERPL DL<=0.05 MIU/L-ACNC: 2.28 UIU/ML (ref 0.27–4.2)
WBC # BLD: 9.3 K/UL (ref 4.8–10.8)

## 2018-10-01 PROCEDURE — 1123F ACP DISCUSS/DSCN MKR DOCD: CPT | Performed by: INTERNAL MEDICINE

## 2018-10-01 PROCEDURE — G0008 ADMIN INFLUENZA VIRUS VAC: HCPCS | Performed by: INTERNAL MEDICINE

## 2018-10-01 PROCEDURE — 1090F PRES/ABSN URINE INCON ASSESS: CPT | Performed by: INTERNAL MEDICINE

## 2018-10-01 PROCEDURE — 99214 OFFICE O/P EST MOD 30 MIN: CPT | Performed by: INTERNAL MEDICINE

## 2018-10-01 PROCEDURE — 4040F PNEUMOC VAC/ADMIN/RCVD: CPT | Performed by: INTERNAL MEDICINE

## 2018-10-01 PROCEDURE — 1036F TOBACCO NON-USER: CPT | Performed by: INTERNAL MEDICINE

## 2018-10-01 PROCEDURE — G8400 PT W/DXA NO RESULTS DOC: HCPCS | Performed by: INTERNAL MEDICINE

## 2018-10-01 PROCEDURE — G8419 CALC BMI OUT NRM PARAM NOF/U: HCPCS | Performed by: INTERNAL MEDICINE

## 2018-10-01 PROCEDURE — G8926 SPIRO NO PERF OR DOC: HCPCS | Performed by: INTERNAL MEDICINE

## 2018-10-01 PROCEDURE — G8427 DOCREV CUR MEDS BY ELIG CLIN: HCPCS | Performed by: INTERNAL MEDICINE

## 2018-10-01 PROCEDURE — 90662 IIV NO PRSV INCREASED AG IM: CPT | Performed by: INTERNAL MEDICINE

## 2018-10-01 PROCEDURE — 3023F SPIROM DOC REV: CPT | Performed by: INTERNAL MEDICINE

## 2018-10-01 PROCEDURE — 96372 THER/PROPH/DIAG INJ SC/IM: CPT | Performed by: INTERNAL MEDICINE

## 2018-10-01 PROCEDURE — 3017F COLORECTAL CA SCREEN DOC REV: CPT | Performed by: INTERNAL MEDICINE

## 2018-10-01 PROCEDURE — 1101F PT FALLS ASSESS-DOCD LE1/YR: CPT | Performed by: INTERNAL MEDICINE

## 2018-10-01 PROCEDURE — G8482 FLU IMMUNIZE ORDER/ADMIN: HCPCS | Performed by: INTERNAL MEDICINE

## 2018-10-01 PROCEDURE — G8598 ASA/ANTIPLAT THER USED: HCPCS | Performed by: INTERNAL MEDICINE

## 2018-10-01 RX ORDER — METHYLPREDNISOLONE ACETATE 40 MG/ML
40 INJECTION, SUSPENSION INTRA-ARTICULAR; INTRALESIONAL; INTRAMUSCULAR; SOFT TISSUE ONCE
Status: COMPLETED | OUTPATIENT
Start: 2018-10-01 | End: 2018-10-01

## 2018-10-01 RX ORDER — CYANOCOBALAMIN 1000 UG/ML
1000 INJECTION INTRAMUSCULAR; SUBCUTANEOUS ONCE
Status: COMPLETED | OUTPATIENT
Start: 2018-10-01 | End: 2018-10-01

## 2018-10-01 RX ADMIN — CYANOCOBALAMIN 1000 MCG: 1000 INJECTION INTRAMUSCULAR; SUBCUTANEOUS at 12:53

## 2018-10-01 RX ADMIN — METHYLPREDNISOLONE ACETATE 40 MG: 40 INJECTION, SUSPENSION INTRA-ARTICULAR; INTRALESIONAL; INTRAMUSCULAR; SOFT TISSUE at 12:46

## 2018-10-01 NOTE — PROGRESS NOTES
daily. No current facility-administered medications for this visit. Review of Systems   Constitutional: Positive for fatigue. Negative for chills and fever. HENT: Positive for congestion, postnasal drip and sinus pressure. Eyes: Negative for discharge and redness. Respiratory: Positive for shortness of breath and wheezing. Negative for cough. Cardiovascular: Negative for chest pain, palpitations and leg swelling. Gastrointestinal: Negative for abdominal distention and abdominal pain. Genitourinary: Negative for dysuria, frequency and urgency. Musculoskeletal: Positive for arthralgias, neck pain and neck stiffness. Negative for back pain. Skin: Negative for rash and wound. Neurological: Negative for dizziness, light-headedness and headaches. Psychiatric/Behavioral: Negative for dysphoric mood and sleep disturbance. The patient is not nervous/anxious. /70 (Site: Left Upper Arm)   Pulse 73   Ht 5' 2\" (1.575 m)   Wt 94 lb (42.6 kg)   SpO2 95%   BMI 17.19 kg/m²     Physical Exam sclera anicteric oropharynx benign TMs slightly dull heart S1-S2 lungs slight end expiratory wheeze hard of hearing extremities without edema mood is good    Results for orders placed or performed in visit on 06/26/18   Urine Drug Screen   Result Value Ref Range    Amphetamine Screen, Urine      Barbiturate Screen, Urine      Benzodiazepine Screen, Urine      Cannabinoid Scrn, Ur      Cocaine Metabolite, Urine      Methadone Screen, Urine      Opiates, Urine possitive     PCP      Propoxyphene, Urine      Phencyclidine, Urine      Oxycodone      Methamphetamine, Urine      Tricyclic Antidepressants, Urine      MDMA, Urine      6-Acetylmorphine, Ur      EDDP, Urine      Ethanol, Ur      pH, Urine      Creatinine, Ur  mg/dL       ASSESSMENT/ PLAN:  1.  Chronic bilateral low back pain without sciatica  Continue current medical management can't tolerate NSAIDs follow closely  - methylPREDNISolone

## 2018-10-10 ASSESSMENT — ENCOUNTER SYMPTOMS
SHORTNESS OF BREATH: 1
ABDOMINAL PAIN: 0
SINUS PRESSURE: 1
WHEEZING: 1
COUGH: 0
EYE REDNESS: 0
EYE DISCHARGE: 0
BACK PAIN: 0
ABDOMINAL DISTENTION: 0

## 2018-10-11 RX ORDER — ATORVASTATIN CALCIUM 40 MG/1
40 TABLET, FILM COATED ORAL DAILY
Qty: 90 TABLET | Refills: 3 | Status: SHIPPED | OUTPATIENT
Start: 2018-10-11 | End: 2019-11-19 | Stop reason: SDUPTHER

## 2018-10-25 DIAGNOSIS — M15.9 PRIMARY OSTEOARTHRITIS INVOLVING MULTIPLE JOINTS: ICD-10-CM

## 2018-10-25 RX ORDER — HYDROCODONE BITARTRATE AND ACETAMINOPHEN 7.5; 325 MG/1; MG/1
1 TABLET ORAL EVERY 8 HOURS PRN
Qty: 90 TABLET | Refills: 0 | Status: SHIPPED | OUTPATIENT
Start: 2018-10-25 | End: 2018-12-21 | Stop reason: SDUPTHER

## 2018-12-06 RX ORDER — FLUTICASONE PROPIONATE 50 MCG
SPRAY, SUSPENSION (ML) NASAL
Qty: 16 G | Refills: 5 | Status: SHIPPED | OUTPATIENT
Start: 2018-12-06 | End: 2022-02-15

## 2018-12-21 DIAGNOSIS — M15.9 PRIMARY OSTEOARTHRITIS INVOLVING MULTIPLE JOINTS: ICD-10-CM

## 2018-12-21 RX ORDER — HYDROCODONE BITARTRATE AND ACETAMINOPHEN 7.5; 325 MG/1; MG/1
1 TABLET ORAL EVERY 8 HOURS PRN
Qty: 90 TABLET | Refills: 0 | Status: SHIPPED | OUTPATIENT
Start: 2018-12-21 | End: 2019-02-08 | Stop reason: SDUPTHER

## 2019-01-10 DIAGNOSIS — E03.9 ACQUIRED HYPOTHYROIDISM: ICD-10-CM

## 2019-01-10 RX ORDER — LEVOTHYROXINE SODIUM 0.03 MG/1
TABLET ORAL
Qty: 90 TABLET | Refills: 3 | Status: SHIPPED | OUTPATIENT
Start: 2019-01-10 | End: 2020-01-20

## 2019-02-05 ENCOUNTER — OFFICE VISIT (OUTPATIENT)
Dept: INTERNAL MEDICINE | Age: 69
End: 2019-02-05
Payer: MEDICARE

## 2019-02-05 VITALS
BODY MASS INDEX: 16.48 KG/M2 | HEART RATE: 85 BPM | SYSTOLIC BLOOD PRESSURE: 100 MMHG | DIASTOLIC BLOOD PRESSURE: 58 MMHG | OXYGEN SATURATION: 95 % | HEIGHT: 63 IN | WEIGHT: 93 LBS

## 2019-02-05 DIAGNOSIS — J41.1 MUCOPURULENT CHRONIC BRONCHITIS (HCC): Primary | ICD-10-CM

## 2019-02-05 DIAGNOSIS — Z12.31 SCREENING MAMMOGRAM, ENCOUNTER FOR: ICD-10-CM

## 2019-02-05 DIAGNOSIS — J20.9 BRONCHITIS, ACUTE, WITH BRONCHOSPASM: ICD-10-CM

## 2019-02-05 DIAGNOSIS — M47.22 OSTEOARTHRITIS OF SPINE WITH RADICULOPATHY, CERVICAL REGION: ICD-10-CM

## 2019-02-05 DIAGNOSIS — E78.00 PURE HYPERCHOLESTEROLEMIA: ICD-10-CM

## 2019-02-05 DIAGNOSIS — E03.9 ACQUIRED HYPOTHYROIDISM: ICD-10-CM

## 2019-02-05 PROCEDURE — 96372 THER/PROPH/DIAG INJ SC/IM: CPT | Performed by: INTERNAL MEDICINE

## 2019-02-05 PROCEDURE — 1036F TOBACCO NON-USER: CPT | Performed by: INTERNAL MEDICINE

## 2019-02-05 PROCEDURE — G8598 ASA/ANTIPLAT THER USED: HCPCS | Performed by: INTERNAL MEDICINE

## 2019-02-05 PROCEDURE — G8482 FLU IMMUNIZE ORDER/ADMIN: HCPCS | Performed by: INTERNAL MEDICINE

## 2019-02-05 PROCEDURE — G8427 DOCREV CUR MEDS BY ELIG CLIN: HCPCS | Performed by: INTERNAL MEDICINE

## 2019-02-05 PROCEDURE — 1090F PRES/ABSN URINE INCON ASSESS: CPT | Performed by: INTERNAL MEDICINE

## 2019-02-05 PROCEDURE — 3023F SPIROM DOC REV: CPT | Performed by: INTERNAL MEDICINE

## 2019-02-05 PROCEDURE — G8419 CALC BMI OUT NRM PARAM NOF/U: HCPCS | Performed by: INTERNAL MEDICINE

## 2019-02-05 PROCEDURE — G8926 SPIRO NO PERF OR DOC: HCPCS | Performed by: INTERNAL MEDICINE

## 2019-02-05 PROCEDURE — 1101F PT FALLS ASSESS-DOCD LE1/YR: CPT | Performed by: INTERNAL MEDICINE

## 2019-02-05 PROCEDURE — 3017F COLORECTAL CA SCREEN DOC REV: CPT | Performed by: INTERNAL MEDICINE

## 2019-02-05 PROCEDURE — 1123F ACP DISCUSS/DSCN MKR DOCD: CPT | Performed by: INTERNAL MEDICINE

## 2019-02-05 PROCEDURE — G8400 PT W/DXA NO RESULTS DOC: HCPCS | Performed by: INTERNAL MEDICINE

## 2019-02-05 PROCEDURE — 99213 OFFICE O/P EST LOW 20 MIN: CPT | Performed by: INTERNAL MEDICINE

## 2019-02-05 PROCEDURE — 4040F PNEUMOC VAC/ADMIN/RCVD: CPT | Performed by: INTERNAL MEDICINE

## 2019-02-05 RX ORDER — AZITHROMYCIN 250 MG/1
TABLET, FILM COATED ORAL
Qty: 6 TABLET | Refills: 0 | Status: SHIPPED | OUTPATIENT
Start: 2019-02-05 | End: 2019-05-10 | Stop reason: CLARIF

## 2019-02-05 RX ORDER — METHYLPREDNISOLONE ACETATE 40 MG/ML
40 INJECTION, SUSPENSION INTRA-ARTICULAR; INTRALESIONAL; INTRAMUSCULAR; SOFT TISSUE ONCE
Status: COMPLETED | OUTPATIENT
Start: 2019-02-05 | End: 2019-02-05

## 2019-02-05 RX ADMIN — METHYLPREDNISOLONE ACETATE 40 MG: 40 INJECTION, SUSPENSION INTRA-ARTICULAR; INTRALESIONAL; INTRAMUSCULAR; SOFT TISSUE at 16:18

## 2019-02-05 ASSESSMENT — ENCOUNTER SYMPTOMS
COUGH: 1
WHEEZING: 1
EYE REDNESS: 0
SHORTNESS OF BREATH: 1
ABDOMINAL DISTENTION: 0
BACK PAIN: 1
SINUS PRESSURE: 0
EYE DISCHARGE: 0
ABDOMINAL PAIN: 0

## 2019-02-05 ASSESSMENT — LIFESTYLE VARIABLES: HOW OFTEN DO YOU HAVE A DRINK CONTAINING ALCOHOL: 0

## 2019-02-08 DIAGNOSIS — M15.9 PRIMARY OSTEOARTHRITIS INVOLVING MULTIPLE JOINTS: ICD-10-CM

## 2019-02-08 RX ORDER — HYDROCODONE BITARTRATE AND ACETAMINOPHEN 7.5; 325 MG/1; MG/1
TABLET ORAL
Qty: 90 TABLET | Refills: 0 | Status: SHIPPED | OUTPATIENT
Start: 2019-02-08 | End: 2019-03-26 | Stop reason: SDUPTHER

## 2019-03-07 PROBLEM — Z12.31 SCREENING MAMMOGRAM, ENCOUNTER FOR: Status: RESOLVED | Noted: 2018-03-27 | Resolved: 2019-03-07

## 2019-03-26 DIAGNOSIS — M15.9 PRIMARY OSTEOARTHRITIS INVOLVING MULTIPLE JOINTS: ICD-10-CM

## 2019-03-26 DIAGNOSIS — F34.1 DYSTHYMIA: ICD-10-CM

## 2019-03-26 RX ORDER — SERTRALINE HYDROCHLORIDE 25 MG/1
TABLET, FILM COATED ORAL
Qty: 90 TABLET | Refills: 3 | Status: SHIPPED | OUTPATIENT
Start: 2019-03-26 | End: 2020-03-19

## 2019-03-28 RX ORDER — HYDROCODONE BITARTRATE AND ACETAMINOPHEN 7.5; 325 MG/1; MG/1
1 TABLET ORAL EVERY 8 HOURS PRN
Qty: 90 TABLET | Refills: 0 | Status: SHIPPED | OUTPATIENT
Start: 2019-03-28 | End: 2019-04-29 | Stop reason: SDUPTHER

## 2019-04-29 DIAGNOSIS — M15.9 PRIMARY OSTEOARTHRITIS INVOLVING MULTIPLE JOINTS: ICD-10-CM

## 2019-04-29 RX ORDER — HYDROCODONE BITARTRATE AND ACETAMINOPHEN 7.5; 325 MG/1; MG/1
1 TABLET ORAL 2 TIMES DAILY PRN
Qty: 60 TABLET | Refills: 0 | Status: SHIPPED | OUTPATIENT
Start: 2019-04-29 | End: 2019-06-17 | Stop reason: SDUPTHER

## 2019-05-06 ENCOUNTER — TRANSCRIBE ORDERS (OUTPATIENT)
Dept: ADMINISTRATIVE | Facility: HOSPITAL | Age: 69
End: 2019-05-06

## 2019-05-06 DIAGNOSIS — Z12.31 SCREENING MAMMOGRAM, ENCOUNTER FOR: Primary | ICD-10-CM

## 2019-05-06 DIAGNOSIS — E78.00 PURE HYPERCHOLESTEROLEMIA: ICD-10-CM

## 2019-05-06 LAB
ALBUMIN SERPL-MCNC: 4.2 G/DL (ref 3.5–5.2)
ALP BLD-CCNC: 79 U/L (ref 35–104)
ALT SERPL-CCNC: 12 U/L (ref 5–33)
ANION GAP SERPL CALCULATED.3IONS-SCNC: 11 MMOL/L (ref 7–19)
AST SERPL-CCNC: 23 U/L (ref 5–32)
BILIRUB SERPL-MCNC: 0.4 MG/DL (ref 0.2–1.2)
BUN BLDV-MCNC: 8 MG/DL (ref 8–23)
CALCIUM SERPL-MCNC: 9.5 MG/DL (ref 8.8–10.2)
CHLORIDE BLD-SCNC: 103 MMOL/L (ref 98–111)
CHOLESTEROL, TOTAL: 148 MG/DL (ref 160–199)
CO2: 27 MMOL/L (ref 22–29)
CREAT SERPL-MCNC: 0.6 MG/DL (ref 0.5–0.9)
GFR NON-AFRICAN AMERICAN: >60
GLUCOSE BLD-MCNC: 73 MG/DL (ref 74–109)
HCT VFR BLD CALC: 31.8 % (ref 37–47)
HDLC SERPL-MCNC: 82 MG/DL (ref 65–121)
HEMOGLOBIN: 9.8 G/DL (ref 12–16)
LDL CHOLESTEROL CALCULATED: 52 MG/DL
MCH RBC QN AUTO: 27.7 PG (ref 27–31)
MCHC RBC AUTO-ENTMCNC: 30.8 G/DL (ref 33–37)
MCV RBC AUTO: 89.8 FL (ref 81–99)
PDW BLD-RTO: 14.7 % (ref 11.5–14.5)
PLATELET # BLD: 351 K/UL (ref 130–400)
PMV BLD AUTO: 10.8 FL (ref 9.4–12.3)
POTASSIUM SERPL-SCNC: 4.3 MMOL/L (ref 3.5–5)
RBC # BLD: 3.54 M/UL (ref 4.2–5.4)
SODIUM BLD-SCNC: 141 MMOL/L (ref 136–145)
TOTAL PROTEIN: 6.9 G/DL (ref 6.6–8.7)
TRIGL SERPL-MCNC: 71 MG/DL (ref 0–149)
TSH SERPL DL<=0.05 MIU/L-ACNC: 0.85 UIU/ML (ref 0.27–4.2)
WBC # BLD: 7.8 K/UL (ref 4.8–10.8)

## 2019-05-10 ENCOUNTER — OFFICE VISIT (OUTPATIENT)
Dept: INTERNAL MEDICINE | Age: 69
End: 2019-05-10
Payer: MEDICARE

## 2019-05-10 VITALS
WEIGHT: 94 LBS | BODY MASS INDEX: 16.66 KG/M2 | HEIGHT: 63 IN | SYSTOLIC BLOOD PRESSURE: 120 MMHG | HEART RATE: 74 BPM | DIASTOLIC BLOOD PRESSURE: 78 MMHG | OXYGEN SATURATION: 96 %

## 2019-05-10 DIAGNOSIS — M47.22 OSTEOARTHRITIS OF SPINE WITH RADICULOPATHY, CERVICAL REGION: ICD-10-CM

## 2019-05-10 DIAGNOSIS — M81.6 LOCALIZED OSTEOPOROSIS, UNSPECIFIED PATHOLOGICAL FRACTURE PRESENCE: ICD-10-CM

## 2019-05-10 DIAGNOSIS — Z00.00 ROUTINE GENERAL MEDICAL EXAMINATION AT A HEALTH CARE FACILITY: ICD-10-CM

## 2019-05-10 DIAGNOSIS — M54.50 CHRONIC BILATERAL LOW BACK PAIN WITHOUT SCIATICA: ICD-10-CM

## 2019-05-10 DIAGNOSIS — F34.1 DYSTHYMIA: ICD-10-CM

## 2019-05-10 DIAGNOSIS — Z79.899 LONG-TERM USE OF HIGH-RISK MEDICATION: ICD-10-CM

## 2019-05-10 DIAGNOSIS — J30.89 NON-SEASONAL ALLERGIC RHINITIS, UNSPECIFIED TRIGGER: ICD-10-CM

## 2019-05-10 DIAGNOSIS — E03.9 ACQUIRED HYPOTHYROIDISM: ICD-10-CM

## 2019-05-10 DIAGNOSIS — Z87.891 PERSONAL HISTORY OF TOBACCO USE: ICD-10-CM

## 2019-05-10 DIAGNOSIS — Z02.83 ENCOUNTER FOR DRUG SCREENING: ICD-10-CM

## 2019-05-10 DIAGNOSIS — M15.9 PRIMARY OSTEOARTHRITIS INVOLVING MULTIPLE JOINTS: ICD-10-CM

## 2019-05-10 DIAGNOSIS — Z00.00 MEDICARE ANNUAL WELLNESS VISIT, SUBSEQUENT: Primary | ICD-10-CM

## 2019-05-10 DIAGNOSIS — G89.29 CHRONIC BILATERAL LOW BACK PAIN WITHOUT SCIATICA: ICD-10-CM

## 2019-05-10 DIAGNOSIS — D64.9 ANEMIA, UNSPECIFIED TYPE: ICD-10-CM

## 2019-05-10 LAB
AMPHETAMINE SCREEN, URINE: NORMAL
BARBITURATE SCREEN, URINE: NORMAL
BENZODIAZEPINE SCREEN, URINE: NORMAL
BUPRENORPHINE URINE: NORMAL
COCAINE METABOLITE SCREEN URINE: NORMAL
GABAPENTIN SCREEN, URINE: NORMAL
MDMA URINE: NORMAL
METHADONE SCREEN, URINE: NORMAL
METHAMPHETAMINE, URINE: NORMAL
OPIATE SCREEN URINE: NORMAL
OXYCODONE SCREEN URINE: NORMAL
PHENCYCLIDINE SCREEN URINE: NORMAL
PROPOXYPHENE SCREEN, URINE: NORMAL
THC SCREEN, URINE: NORMAL
TRICYCLIC ANTIDEPRESSANTS, UR: NORMAL

## 2019-05-10 PROCEDURE — G8427 DOCREV CUR MEDS BY ELIG CLIN: HCPCS | Performed by: INTERNAL MEDICINE

## 2019-05-10 PROCEDURE — 1123F ACP DISCUSS/DSCN MKR DOCD: CPT | Performed by: INTERNAL MEDICINE

## 2019-05-10 PROCEDURE — 99213 OFFICE O/P EST LOW 20 MIN: CPT | Performed by: INTERNAL MEDICINE

## 2019-05-10 PROCEDURE — 4040F PNEUMOC VAC/ADMIN/RCVD: CPT | Performed by: INTERNAL MEDICINE

## 2019-05-10 PROCEDURE — 1090F PRES/ABSN URINE INCON ASSESS: CPT | Performed by: INTERNAL MEDICINE

## 2019-05-10 PROCEDURE — G0439 PPPS, SUBSEQ VISIT: HCPCS | Performed by: INTERNAL MEDICINE

## 2019-05-10 PROCEDURE — 1036F TOBACCO NON-USER: CPT | Performed by: INTERNAL MEDICINE

## 2019-05-10 PROCEDURE — 3017F COLORECTAL CA SCREEN DOC REV: CPT | Performed by: INTERNAL MEDICINE

## 2019-05-10 PROCEDURE — G8400 PT W/DXA NO RESULTS DOC: HCPCS | Performed by: INTERNAL MEDICINE

## 2019-05-10 PROCEDURE — G8598 ASA/ANTIPLAT THER USED: HCPCS | Performed by: INTERNAL MEDICINE

## 2019-05-10 PROCEDURE — 80305 DRUG TEST PRSMV DIR OPT OBS: CPT | Performed by: INTERNAL MEDICINE

## 2019-05-10 PROCEDURE — G8419 CALC BMI OUT NRM PARAM NOF/U: HCPCS | Performed by: INTERNAL MEDICINE

## 2019-05-10 ASSESSMENT — PATIENT HEALTH QUESTIONNAIRE - PHQ9
SUM OF ALL RESPONSES TO PHQ QUESTIONS 1-9: 0
SUM OF ALL RESPONSES TO PHQ QUESTIONS 1-9: 0

## 2019-05-10 ASSESSMENT — LIFESTYLE VARIABLES: HOW OFTEN DO YOU HAVE A DRINK CONTAINING ALCOHOL: 0
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
## 2019-05-10 NOTE — PATIENT INSTRUCTIONS
Personalized Preventive Plan for Marva Sandhoff - 5/10/2019  Medicare offers a range of preventive health benefits. Some of the tests and screenings are paid in full while other may be subject to a deductible, co-insurance, and/or copay. Some of these benefits include a comprehensive review of your medical history including lifestyle, illnesses that may run in your family, and various assessments and screenings as appropriate. After reviewing your medical record and screening and assessments performed today your provider may have ordered immunizations, labs, imaging, and/or referrals for you. A list of these orders (if applicable) as well as your Preventive Care list are included within your After Visit Summary for your review. Other Preventive Recommendations:    · A preventive eye exam performed by an eye specialist is recommended every 1-2 years to screen for glaucoma; cataracts, macular degeneration, and other eye disorders. · A preventive dental visit is recommended every 6 months. · Try to get at least 150 minutes of exercise per week or 10,000 steps per day on a pedometer . · Order or download the FREE \"Exercise & Physical Activity: Your Everyday Guide\" from [employer]. Call 1-234.518.9606 or search [employer] online. · You need 0032-6635 mg of calcium and 5927-1877 IU of vitamin D per day. It is possible to meet your calcium requirement with diet alone, but a vitamin D supplement is usually necessary to meet this goal.  · When exposed to the sun, use a sunscreen that protects against both UVA and UVB radiation with an SPF of 30 or greater. Reapply every 2 to 3 hours or after sweating, drying off with a towel, or swimming. · Always wear a seat belt when traveling in a car. Always wear a helmet when riding a bicycle or motorcycle.

## 2019-05-17 ENCOUNTER — HOSPITAL ENCOUNTER (OUTPATIENT)
Dept: MAMMOGRAPHY | Facility: HOSPITAL | Age: 69
Discharge: HOME | End: 2019-05-17
Admitting: INTERNAL MEDICINE

## 2019-05-17 DIAGNOSIS — Z12.31 SCREENING MAMMOGRAM, ENCOUNTER FOR: ICD-10-CM

## 2019-05-17 PROCEDURE — 77067 SCR MAMMO BI INCL CAD: CPT

## 2019-05-17 PROCEDURE — 77063 BREAST TOMOSYNTHESIS BI: CPT

## 2019-05-20 DIAGNOSIS — J30.89 NON-SEASONAL ALLERGIC RHINITIS: ICD-10-CM

## 2019-05-20 RX ORDER — LEVOCETIRIZINE DIHYDROCHLORIDE 5 MG/1
TABLET, FILM COATED ORAL
Qty: 90 TABLET | Refills: 3 | Status: SHIPPED | OUTPATIENT
Start: 2019-05-20 | End: 2020-05-15

## 2019-05-23 ASSESSMENT — ENCOUNTER SYMPTOMS
ABDOMINAL DISTENTION: 0
EYE REDNESS: 0
ABDOMINAL PAIN: 0
EYE DISCHARGE: 0
WHEEZING: 1
SINUS PRESSURE: 1
COUGH: 1
BACK PAIN: 0

## 2019-06-03 DIAGNOSIS — Z12.31 SCREENING MAMMOGRAM, ENCOUNTER FOR: ICD-10-CM

## 2019-06-07 RX ORDER — DEXLANSOPRAZOLE 60 MG/1
CAPSULE, DELAYED RELEASE ORAL
Qty: 90 CAPSULE | Refills: 3 | Status: SHIPPED | OUTPATIENT
Start: 2019-06-07 | End: 2020-07-15

## 2019-06-17 DIAGNOSIS — M15.9 PRIMARY OSTEOARTHRITIS INVOLVING MULTIPLE JOINTS: ICD-10-CM

## 2019-06-17 RX ORDER — HYDROCODONE BITARTRATE AND ACETAMINOPHEN 7.5; 325 MG/1; MG/1
1 TABLET ORAL 2 TIMES DAILY PRN
Qty: 60 TABLET | Refills: 0 | Status: SHIPPED | OUTPATIENT
Start: 2019-06-17 | End: 2019-07-22 | Stop reason: SDUPTHER

## 2019-07-22 DIAGNOSIS — M15.9 PRIMARY OSTEOARTHRITIS INVOLVING MULTIPLE JOINTS: ICD-10-CM

## 2019-07-22 RX ORDER — HYDROCODONE BITARTRATE AND ACETAMINOPHEN 7.5; 325 MG/1; MG/1
1 TABLET ORAL 2 TIMES DAILY PRN
Qty: 60 TABLET | Refills: 0 | Status: SHIPPED | OUTPATIENT
Start: 2019-07-22 | End: 2019-09-04 | Stop reason: SDUPTHER

## 2019-08-05 DIAGNOSIS — D64.9 ANEMIA, UNSPECIFIED TYPE: ICD-10-CM

## 2019-08-05 LAB
FERRITIN: 37.8 NG/ML (ref 13–150)
FOLATE: 16 NG/ML (ref 4.8–37.3)
HCT VFR BLD CALC: 33.5 % (ref 37–47)
HEMOGLOBIN: 10.4 G/DL (ref 12–16)
IRON: 58 UG/DL (ref 37–145)
MCH RBC QN AUTO: 27.7 PG (ref 27–31)
MCHC RBC AUTO-ENTMCNC: 31 G/DL (ref 33–37)
MCV RBC AUTO: 89.3 FL (ref 81–99)
PDW BLD-RTO: 13.8 % (ref 11.5–14.5)
PLATELET # BLD: 303 K/UL (ref 130–400)
PMV BLD AUTO: 10.9 FL (ref 9.4–12.3)
RBC # BLD: 3.75 M/UL (ref 4.2–5.4)
VITAMIN B-12: 447 PG/ML (ref 211–946)
WBC # BLD: 7.2 K/UL (ref 4.8–10.8)

## 2019-08-12 ENCOUNTER — OFFICE VISIT (OUTPATIENT)
Dept: INTERNAL MEDICINE | Age: 69
End: 2019-08-12
Payer: MEDICARE

## 2019-08-12 VITALS
OXYGEN SATURATION: 97 % | SYSTOLIC BLOOD PRESSURE: 130 MMHG | HEART RATE: 70 BPM | BODY MASS INDEX: 16.66 KG/M2 | HEIGHT: 63 IN | WEIGHT: 94 LBS | DIASTOLIC BLOOD PRESSURE: 66 MMHG

## 2019-08-12 DIAGNOSIS — Z12.11 SCREEN FOR COLON CANCER: ICD-10-CM

## 2019-08-12 DIAGNOSIS — M47.22 OSTEOARTHRITIS OF SPINE WITH RADICULOPATHY, CERVICAL REGION: ICD-10-CM

## 2019-08-12 DIAGNOSIS — D51.8 OTHER VITAMIN B12 DEFICIENCY ANEMIA: ICD-10-CM

## 2019-08-12 DIAGNOSIS — F34.1 DYSTHYMIA: ICD-10-CM

## 2019-08-12 DIAGNOSIS — D50.9 IRON DEFICIENCY ANEMIA, UNSPECIFIED IRON DEFICIENCY ANEMIA TYPE: Primary | ICD-10-CM

## 2019-08-12 PROCEDURE — G8598 ASA/ANTIPLAT THER USED: HCPCS | Performed by: INTERNAL MEDICINE

## 2019-08-12 PROCEDURE — 3017F COLORECTAL CA SCREEN DOC REV: CPT | Performed by: INTERNAL MEDICINE

## 2019-08-12 PROCEDURE — 1123F ACP DISCUSS/DSCN MKR DOCD: CPT | Performed by: INTERNAL MEDICINE

## 2019-08-12 PROCEDURE — 99213 OFFICE O/P EST LOW 20 MIN: CPT | Performed by: INTERNAL MEDICINE

## 2019-08-12 PROCEDURE — G8400 PT W/DXA NO RESULTS DOC: HCPCS | Performed by: INTERNAL MEDICINE

## 2019-08-12 PROCEDURE — G8419 CALC BMI OUT NRM PARAM NOF/U: HCPCS | Performed by: INTERNAL MEDICINE

## 2019-08-12 PROCEDURE — G8427 DOCREV CUR MEDS BY ELIG CLIN: HCPCS | Performed by: INTERNAL MEDICINE

## 2019-08-12 PROCEDURE — 1036F TOBACCO NON-USER: CPT | Performed by: INTERNAL MEDICINE

## 2019-08-12 PROCEDURE — 4040F PNEUMOC VAC/ADMIN/RCVD: CPT | Performed by: INTERNAL MEDICINE

## 2019-08-12 PROCEDURE — 1090F PRES/ABSN URINE INCON ASSESS: CPT | Performed by: INTERNAL MEDICINE
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
## 2019-08-25 ASSESSMENT — ENCOUNTER SYMPTOMS
COUGH: 1
EYE DISCHARGE: 0
BACK PAIN: 0
EYE REDNESS: 0
ABDOMINAL DISTENTION: 0
ABDOMINAL PAIN: 0
WHEEZING: 1
SHORTNESS OF BREATH: 1
SINUS PRESSURE: 0

## 2019-09-04 DIAGNOSIS — M15.9 PRIMARY OSTEOARTHRITIS INVOLVING MULTIPLE JOINTS: ICD-10-CM

## 2019-09-06 RX ORDER — HYDROCODONE BITARTRATE AND ACETAMINOPHEN 7.5; 325 MG/1; MG/1
1 TABLET ORAL 2 TIMES DAILY PRN
Qty: 60 TABLET | Refills: 0 | Status: SHIPPED | OUTPATIENT
Start: 2019-09-06 | End: 2019-10-11 | Stop reason: SDUPTHER

## 2019-10-11 DIAGNOSIS — M15.9 PRIMARY OSTEOARTHRITIS INVOLVING MULTIPLE JOINTS: ICD-10-CM

## 2019-10-11 RX ORDER — HYDROCODONE BITARTRATE AND ACETAMINOPHEN 7.5; 325 MG/1; MG/1
1 TABLET ORAL 2 TIMES DAILY PRN
Qty: 60 TABLET | Refills: 0 | Status: SHIPPED | OUTPATIENT
Start: 2019-10-11 | End: 2019-11-19 | Stop reason: SDUPTHER

## 2019-11-14 ENCOUNTER — OFFICE VISIT (OUTPATIENT)
Dept: INTERNAL MEDICINE | Age: 69
End: 2019-11-14
Payer: MEDICARE

## 2019-11-14 VITALS
BODY MASS INDEX: 16.66 KG/M2 | DIASTOLIC BLOOD PRESSURE: 80 MMHG | WEIGHT: 94 LBS | HEART RATE: 90 BPM | OXYGEN SATURATION: 96 % | SYSTOLIC BLOOD PRESSURE: 130 MMHG | HEIGHT: 63 IN

## 2019-11-14 DIAGNOSIS — J20.9 ACUTE BRONCHITIS, UNSPECIFIED ORGANISM: ICD-10-CM

## 2019-11-14 DIAGNOSIS — E03.9 ACQUIRED HYPOTHYROIDISM: ICD-10-CM

## 2019-11-14 DIAGNOSIS — D50.9 IRON DEFICIENCY ANEMIA, UNSPECIFIED IRON DEFICIENCY ANEMIA TYPE: ICD-10-CM

## 2019-11-14 DIAGNOSIS — Z23 NEED FOR INFLUENZA VACCINATION: ICD-10-CM

## 2019-11-14 DIAGNOSIS — M47.22 OSTEOARTHRITIS OF SPINE WITH RADICULOPATHY, CERVICAL REGION: ICD-10-CM

## 2019-11-14 DIAGNOSIS — J41.1 MUCOPURULENT CHRONIC BRONCHITIS (HCC): Primary | ICD-10-CM

## 2019-11-14 LAB
ALBUMIN SERPL-MCNC: 4.6 G/DL (ref 3.5–5.2)
ALP BLD-CCNC: 86 U/L (ref 35–104)
ALT SERPL-CCNC: 13 U/L (ref 5–33)
ANION GAP SERPL CALCULATED.3IONS-SCNC: 14 MMOL/L (ref 7–19)
AST SERPL-CCNC: 26 U/L (ref 5–32)
BASOPHILS ABSOLUTE: 0 K/UL (ref 0–0.2)
BASOPHILS RELATIVE PERCENT: 0.6 % (ref 0–1)
BILIRUB SERPL-MCNC: 0.4 MG/DL (ref 0.2–1.2)
BUN BLDV-MCNC: 10 MG/DL (ref 8–23)
CALCIUM SERPL-MCNC: 9.6 MG/DL (ref 8.8–10.2)
CHLORIDE BLD-SCNC: 102 MMOL/L (ref 98–111)
CHOLESTEROL, TOTAL: 157 MG/DL (ref 160–199)
CO2: 27 MMOL/L (ref 22–29)
CREAT SERPL-MCNC: 0.7 MG/DL (ref 0.5–0.9)
EOSINOPHILS ABSOLUTE: 0.1 K/UL (ref 0–0.6)
EOSINOPHILS RELATIVE PERCENT: 1.7 % (ref 0–5)
FERRITIN: 31 NG/ML (ref 13–150)
GFR NON-AFRICAN AMERICAN: >60
GLUCOSE BLD-MCNC: 105 MG/DL (ref 74–109)
HCT VFR BLD CALC: 35.8 % (ref 37–47)
HDLC SERPL-MCNC: 87 MG/DL (ref 65–121)
HEMOGLOBIN: 11.2 G/DL (ref 12–16)
IMMATURE GRANULOCYTES #: 0 K/UL
IRON: 52 UG/DL (ref 37–145)
LDL CHOLESTEROL CALCULATED: 54 MG/DL
LYMPHOCYTES ABSOLUTE: 3.1 K/UL (ref 1.1–4.5)
LYMPHOCYTES RELATIVE PERCENT: 44.8 % (ref 20–40)
MCH RBC QN AUTO: 27.9 PG (ref 27–31)
MCHC RBC AUTO-ENTMCNC: 31.3 G/DL (ref 33–37)
MCV RBC AUTO: 89.3 FL (ref 81–99)
MONOCYTES ABSOLUTE: 0.6 K/UL (ref 0–0.9)
MONOCYTES RELATIVE PERCENT: 8.4 % (ref 0–10)
NEUTROPHILS ABSOLUTE: 3 K/UL (ref 1.5–7.5)
NEUTROPHILS RELATIVE PERCENT: 44.2 % (ref 50–65)
PDW BLD-RTO: 13.7 % (ref 11.5–14.5)
PLATELET # BLD: 367 K/UL (ref 130–400)
PMV BLD AUTO: 10.6 FL (ref 9.4–12.3)
POTASSIUM SERPL-SCNC: 4.2 MMOL/L (ref 3.5–5)
RBC # BLD: 4.01 M/UL (ref 4.2–5.4)
SODIUM BLD-SCNC: 143 MMOL/L (ref 136–145)
TOTAL PROTEIN: 7.7 G/DL (ref 6.6–8.7)
TRIGL SERPL-MCNC: 81 MG/DL (ref 0–149)
TSH SERPL DL<=0.05 MIU/L-ACNC: 0.88 UIU/ML (ref 0.27–4.2)
WBC # BLD: 6.9 K/UL (ref 4.8–10.8)

## 2019-11-14 PROCEDURE — 3017F COLORECTAL CA SCREEN DOC REV: CPT | Performed by: INTERNAL MEDICINE

## 2019-11-14 PROCEDURE — G8400 PT W/DXA NO RESULTS DOC: HCPCS | Performed by: INTERNAL MEDICINE

## 2019-11-14 PROCEDURE — G0008 ADMIN INFLUENZA VIRUS VAC: HCPCS | Performed by: INTERNAL MEDICINE

## 2019-11-14 PROCEDURE — 96372 THER/PROPH/DIAG INJ SC/IM: CPT | Performed by: INTERNAL MEDICINE

## 2019-11-14 PROCEDURE — G8482 FLU IMMUNIZE ORDER/ADMIN: HCPCS | Performed by: INTERNAL MEDICINE

## 2019-11-14 PROCEDURE — 1123F ACP DISCUSS/DSCN MKR DOCD: CPT | Performed by: INTERNAL MEDICINE

## 2019-11-14 PROCEDURE — 1036F TOBACCO NON-USER: CPT | Performed by: INTERNAL MEDICINE

## 2019-11-14 PROCEDURE — 90653 IIV ADJUVANT VACCINE IM: CPT | Performed by: INTERNAL MEDICINE

## 2019-11-14 PROCEDURE — G8427 DOCREV CUR MEDS BY ELIG CLIN: HCPCS | Performed by: INTERNAL MEDICINE

## 2019-11-14 PROCEDURE — 1090F PRES/ABSN URINE INCON ASSESS: CPT | Performed by: INTERNAL MEDICINE

## 2019-11-14 PROCEDURE — 3023F SPIROM DOC REV: CPT | Performed by: INTERNAL MEDICINE

## 2019-11-14 PROCEDURE — G8598 ASA/ANTIPLAT THER USED: HCPCS | Performed by: INTERNAL MEDICINE

## 2019-11-14 PROCEDURE — G8926 SPIRO NO PERF OR DOC: HCPCS | Performed by: INTERNAL MEDICINE

## 2019-11-14 PROCEDURE — G8419 CALC BMI OUT NRM PARAM NOF/U: HCPCS | Performed by: INTERNAL MEDICINE

## 2019-11-14 PROCEDURE — 4040F PNEUMOC VAC/ADMIN/RCVD: CPT | Performed by: INTERNAL MEDICINE

## 2019-11-14 PROCEDURE — 99214 OFFICE O/P EST MOD 30 MIN: CPT | Performed by: INTERNAL MEDICINE

## 2019-11-14 RX ORDER — METHYLPREDNISOLONE ACETATE 80 MG/ML
80 INJECTION, SUSPENSION INTRA-ARTICULAR; INTRALESIONAL; INTRAMUSCULAR; SOFT TISSUE ONCE
Status: COMPLETED | OUTPATIENT
Start: 2019-11-14 | End: 2019-11-14

## 2019-11-14 RX ORDER — AZITHROMYCIN 250 MG/1
TABLET, FILM COATED ORAL
Qty: 6 TABLET | Refills: 0 | Status: SHIPPED | OUTPATIENT
Start: 2019-11-14 | End: 2019-12-26 | Stop reason: ALTCHOICE

## 2019-11-14 RX ADMIN — METHYLPREDNISOLONE ACETATE 80 MG: 80 INJECTION, SUSPENSION INTRA-ARTICULAR; INTRALESIONAL; INTRAMUSCULAR; SOFT TISSUE at 15:19

## 2019-11-19 DIAGNOSIS — M15.9 PRIMARY OSTEOARTHRITIS INVOLVING MULTIPLE JOINTS: ICD-10-CM

## 2019-11-19 RX ORDER — HYDROCODONE BITARTRATE AND ACETAMINOPHEN 7.5; 325 MG/1; MG/1
1 TABLET ORAL 2 TIMES DAILY PRN
Qty: 60 TABLET | Refills: 0 | Status: SHIPPED | OUTPATIENT
Start: 2019-11-19 | End: 2019-12-27 | Stop reason: SDUPTHER

## 2019-11-19 RX ORDER — ATORVASTATIN CALCIUM 40 MG/1
40 TABLET, FILM COATED ORAL DAILY
Qty: 90 TABLET | Refills: 3 | Status: SHIPPED | OUTPATIENT
Start: 2019-11-19 | End: 2020-11-20

## 2019-11-21 ENCOUNTER — HOSPITAL ENCOUNTER (OUTPATIENT)
Dept: CT IMAGING | Age: 69
Discharge: HOME | End: 2019-11-21
Payer: MEDICARE

## 2019-11-21 DIAGNOSIS — Z87.891 PERSONAL HISTORY OF TOBACCO USE: ICD-10-CM

## 2019-11-21 PROCEDURE — G0297 LDCT FOR LUNG CA SCREEN: HCPCS

## 2019-11-24 ASSESSMENT — ENCOUNTER SYMPTOMS
BACK PAIN: 0
COUGH: 1
SHORTNESS OF BREATH: 1
EYE REDNESS: 0
ABDOMINAL DISTENTION: 0
WHEEZING: 1
SINUS PRESSURE: 0
ABDOMINAL PAIN: 0
EYE DISCHARGE: 0

## 2019-12-18 ENCOUNTER — TRANSCRIBE ORDERS (OUTPATIENT)
Dept: ADMINISTRATIVE | Facility: HOSPITAL | Age: 69
End: 2019-12-18

## 2019-12-18 DIAGNOSIS — M81.6 LOCALIZED OSTEOPOROSIS, UNSPECIFIED PATHOLOGICAL FRACTURE PRESENCE: Primary | ICD-10-CM

## 2019-12-26 DIAGNOSIS — M15.9 PRIMARY OSTEOARTHRITIS INVOLVING MULTIPLE JOINTS: ICD-10-CM

## 2019-12-27 RX ORDER — HYDROCODONE BITARTRATE AND ACETAMINOPHEN 7.5; 325 MG/1; MG/1
1 TABLET ORAL 2 TIMES DAILY PRN
Qty: 60 TABLET | Refills: 0 | Status: SHIPPED | OUTPATIENT
Start: 2019-12-27 | End: 2020-02-06

## 2020-01-07 ENCOUNTER — TELEPHONE (OUTPATIENT)
Dept: INTERNAL MEDICINE | Age: 70
End: 2020-01-07

## 2020-01-20 RX ORDER — LEVOTHYROXINE SODIUM 0.03 MG/1
TABLET ORAL
Qty: 90 TABLET | Refills: 3 | Status: SHIPPED | OUTPATIENT
Start: 2020-01-20 | End: 2021-02-08

## 2020-02-06 RX ORDER — HYDROCODONE BITARTRATE AND ACETAMINOPHEN 7.5; 325 MG/1; MG/1
1 TABLET ORAL 2 TIMES DAILY PRN
Qty: 60 TABLET | Refills: 0 | Status: SHIPPED | OUTPATIENT
Start: 2020-02-06 | End: 2020-02-06

## 2020-02-06 RX ORDER — HYDROCODONE BITARTRATE AND ACETAMINOPHEN 7.5; 325 MG/1; MG/1
1 TABLET ORAL 2 TIMES DAILY PRN
Qty: 60 TABLET | Refills: 0 | Status: SHIPPED | OUTPATIENT
Start: 2020-02-06 | End: 2020-03-18 | Stop reason: SDUPTHER

## 2020-02-18 ENCOUNTER — OFFICE VISIT (OUTPATIENT)
Dept: INTERNAL MEDICINE | Age: 70
End: 2020-02-18
Payer: MEDICARE

## 2020-02-18 VITALS
HEART RATE: 72 BPM | HEIGHT: 63 IN | BODY MASS INDEX: 16.66 KG/M2 | WEIGHT: 94 LBS | SYSTOLIC BLOOD PRESSURE: 136 MMHG | OXYGEN SATURATION: 97 % | DIASTOLIC BLOOD PRESSURE: 78 MMHG

## 2020-02-18 PROCEDURE — G8400 PT W/DXA NO RESULTS DOC: HCPCS | Performed by: INTERNAL MEDICINE

## 2020-02-18 PROCEDURE — 96372 THER/PROPH/DIAG INJ SC/IM: CPT | Performed by: INTERNAL MEDICINE

## 2020-02-18 PROCEDURE — G8926 SPIRO NO PERF OR DOC: HCPCS | Performed by: INTERNAL MEDICINE

## 2020-02-18 PROCEDURE — 1123F ACP DISCUSS/DSCN MKR DOCD: CPT | Performed by: INTERNAL MEDICINE

## 2020-02-18 PROCEDURE — 99213 OFFICE O/P EST LOW 20 MIN: CPT | Performed by: INTERNAL MEDICINE

## 2020-02-18 PROCEDURE — 3023F SPIROM DOC REV: CPT | Performed by: INTERNAL MEDICINE

## 2020-02-18 PROCEDURE — G8482 FLU IMMUNIZE ORDER/ADMIN: HCPCS | Performed by: INTERNAL MEDICINE

## 2020-02-18 PROCEDURE — G8419 CALC BMI OUT NRM PARAM NOF/U: HCPCS | Performed by: INTERNAL MEDICINE

## 2020-02-18 PROCEDURE — G8427 DOCREV CUR MEDS BY ELIG CLIN: HCPCS | Performed by: INTERNAL MEDICINE

## 2020-02-18 PROCEDURE — 1036F TOBACCO NON-USER: CPT | Performed by: INTERNAL MEDICINE

## 2020-02-18 PROCEDURE — 3017F COLORECTAL CA SCREEN DOC REV: CPT | Performed by: INTERNAL MEDICINE

## 2020-02-18 PROCEDURE — 1090F PRES/ABSN URINE INCON ASSESS: CPT | Performed by: INTERNAL MEDICINE

## 2020-02-18 PROCEDURE — 4040F PNEUMOC VAC/ADMIN/RCVD: CPT | Performed by: INTERNAL MEDICINE

## 2020-02-18 RX ORDER — CYANOCOBALAMIN 1000 UG/ML
1000 INJECTION INTRAMUSCULAR; SUBCUTANEOUS ONCE
Status: COMPLETED | OUTPATIENT
Start: 2020-02-18 | End: 2020-02-18

## 2020-02-18 RX ORDER — METHYLPREDNISOLONE ACETATE 80 MG/ML
80 INJECTION, SUSPENSION INTRA-ARTICULAR; INTRALESIONAL; INTRAMUSCULAR; SOFT TISSUE ONCE
Status: COMPLETED | OUTPATIENT
Start: 2020-02-18 | End: 2020-02-18

## 2020-02-18 RX ADMIN — CYANOCOBALAMIN 1000 MCG: 1000 INJECTION INTRAMUSCULAR; SUBCUTANEOUS at 15:55

## 2020-02-18 RX ADMIN — METHYLPREDNISOLONE ACETATE 80 MG: 80 INJECTION, SUSPENSION INTRA-ARTICULAR; INTRALESIONAL; INTRAMUSCULAR; SOFT TISSUE at 15:56

## 2020-02-18 NOTE — PROGRESS NOTES
Substance and Sexual Activity    Alcohol use: No    Drug use: No    Sexual activity: Not on file   Lifestyle    Physical activity:     Days per week: Not on file     Minutes per session: Not on file    Stress: Not on file   Relationships    Social connections:     Talks on phone: Not on file     Gets together: Not on file     Attends Judaism service: Not on file     Active member of club or organization: Not on file     Attends meetings of clubs or organizations: Not on file     Relationship status: Not on file    Intimate partner violence:     Fear of current or ex partner: Not on file     Emotionally abused: Not on file     Physically abused: Not on file     Forced sexual activity: Not on file   Other Topics Concern    Not on file   Social History Narrative    Not on file       Allergies   Allergen Reactions    Dye [Iodides]      Throat tightness       Current Outpatient Medications   Medication Sig Dispense Refill    HYDROcodone-acetaminophen (NORCO) 7.5-325 MG per tablet Take 1 tablet by mouth 2 times daily as needed for Pain for up to 30 days. 60 tablet 0    levothyroxine (SYNTHROID) 25 MCG tablet TAKE ONE TABLET BY MOUTH IN THE MORNING ON AN EMPTY STOMACH 90 tablet 3    atorvastatin (LIPITOR) 40 MG tablet Take 1 tablet by mouth daily 90 tablet 3    DEXILANT 60 MG CPDR delayed release capsule TAKE 1 CAPSULE BY MOUTH DAILY EVERY MORNING BEFORE BREAKFAST.  90 capsule 3    levocetirizine (XYZAL) 5 MG tablet TAKE ONE TABLET BY MOUTH EVERY NIGHT AT BEDTIME 90 tablet 3    sertraline (ZOLOFT) 25 MG tablet TAKE ONE TABLET BY MOUTH EVERY DAY 90 tablet 3    VENTOLIN  (90 Base) MCG/ACT inhaler Inhale 2 puffs into the lungs every 6 hours as needed for Wheezing 54 g 3    fluticasone (FLONASE) 50 MCG/ACT nasal spray INSTILL 2 SPRAYS IN EACH NOSTRIL ONCE DAILY 16 g 5    COMBIVENT RESPIMAT  MCG/ACT AERS inhaler INHALE TWO PUFFS INTO THE LUNGS EVERY SIX HOURS AS NEEDED FOR WHEEZE (REPLACES 10/01/18 94 lb (42.6 kg)   06/26/18 98 lb (44.5 kg)     BMI Readings from Last 7 Encounters:   02/18/20 16.65 kg/m²   11/14/19 16.65 kg/m²   08/12/19 16.65 kg/m²   05/10/19 16.65 kg/m²   02/05/19 16.47 kg/m²   10/01/18 17.19 kg/m²   06/26/18 17.92 kg/m²     Resp Readings from Last 7 Encounters:   06/26/18 20   03/27/18 18   03/21/12 18   12/19/11 12   11/14/11 16       Physical Exam  Constitutional:       General: She is not in acute distress. Comments: Patient is hard of hearing some limited range of motion neck. Eyes:      General: No scleral icterus. Neck:      Musculoskeletal: Neck supple. Cardiovascular:      Heart sounds: Normal heart sounds. Pulmonary:      Breath sounds: Normal breath sounds. Lymphadenopathy:      Cervical: No cervical adenopathy. Skin:     Findings: No rash.          Results for orders placed or performed in visit on 11/14/19   Comprehensive Metabolic Panel   Result Value Ref Range    Sodium 143 136 - 145 mmol/L    Potassium 4.2 3.5 - 5.0 mmol/L    Chloride 102 98 - 111 mmol/L    CO2 27 22 - 29 mmol/L    Anion Gap 14 7 - 19 mmol/L    Glucose 105 74 - 109 mg/dL    BUN 10 8 - 23 mg/dL    CREATININE 0.7 0.5 - 0.9 mg/dL    GFR Non-African American >60 >60    Calcium 9.6 8.8 - 10.2 mg/dL    Total Protein 7.7 6.6 - 8.7 g/dL    Alb 4.6 3.5 - 5.2 g/dL    Total Bilirubin 0.4 0.2 - 1.2 mg/dL    Alkaline Phosphatase 86 35 - 104 U/L    ALT 13 5 - 33 U/L    AST 26 5 - 32 U/L   Ferritin   Result Value Ref Range    Ferritin 31.0 13.0 - 150.0 ng/mL   Iron   Result Value Ref Range    Iron 52 37 - 145 ug/dL   Lipid Panel   Result Value Ref Range    Cholesterol, Total 157 (L) 160 - 199 mg/dL    Triglycerides 81 0 - 149 mg/dL    HDL 87 65 - 121 mg/dL    LDL Calculated 54 <100 mg/dL   TSH without Reflex   Result Value Ref Range    TSH 0.875 0.270 - 4.200 uIU/mL   CBC Auto Differential   Result Value Ref Range    WBC 6.9 4.8 - 10.8 K/uL    RBC 4.01 (L) 4.20 - 5.40 M/uL    Hemoglobin 11.2 (L) 12.0 - 16.0 g/dL    Hematocrit 35.8 (L) 37.0 - 47.0 %    MCV 89.3 81.0 - 99.0 fL    MCH 27.9 27.0 - 31.0 pg    MCHC 31.3 (L) 33.0 - 37.0 g/dL    RDW 13.7 11.5 - 14.5 %    Platelets 858 052 - 500 K/uL    MPV 10.6 9.4 - 12.3 fL    Neutrophils % 44.2 (L) 50.0 - 65.0 %    Lymphocytes % 44.8 (H) 20.0 - 40.0 %    Monocytes % 8.4 0.0 - 10.0 %    Eosinophils % 1.7 0.0 - 5.0 %    Basophils % 0.6 0.0 - 1.0 %    Neutrophils Absolute 3.0 1.5 - 7.5 K/uL    Immature Granulocytes # 0.0 K/uL    Lymphocytes Absolute 3.1 1.1 - 4.5 K/uL    Monocytes Absolute 0.60 0.00 - 0.90 K/uL    Eosinophils Absolute 0.10 0.00 - 0.60 K/uL    Basophils Absolute 0.00 0.00 - 0.20 K/uL       ASSESSMENT/ PLAN:  1. Other vitamin B12 deficiency anemia  To new current plan of care and follow  - cyanocobalamin injection 1,000 mcg  - CBC; Future  - Comprehensive Metabolic Panel; Future    2. Mucopurulent chronic bronchitis ([facility] [address]  Watch closely caution with steroids but she says she always feels much better with them  - methylPREDNISolone acetate (DEPO-MEDROL) injection 80 mg    3. Vitamin D deficiency    - Vitamin D 25 Hydroxy; Future    4. Pure hypercholesterolemia    - Lipid Panel; Future    5. Acquired hypothyroidism    - TSH without Reflex;  Future    Occasions labs vaccines reviewed keep up-to-date with routine medical care and follow-up

## 2020-02-24 ASSESSMENT — ENCOUNTER SYMPTOMS
EYE DISCHARGE: 0
WHEEZING: 1
COUGH: 1
ABDOMINAL PAIN: 0
SHORTNESS OF BREATH: 1
ABDOMINAL DISTENTION: 0
SINUS PRESSURE: 0
EYE REDNESS: 0
BACK PAIN: 0

## 2020-03-16 ENCOUNTER — TELEPHONE (OUTPATIENT)
Dept: INTERNAL MEDICINE | Age: 70
End: 2020-03-16

## 2020-03-18 RX ORDER — HYDROCODONE BITARTRATE AND ACETAMINOPHEN 7.5; 325 MG/1; MG/1
1 TABLET ORAL 2 TIMES DAILY PRN
Qty: 60 TABLET | Refills: 0 | Status: SHIPPED | OUTPATIENT
Start: 2020-03-18 | End: 2020-04-22 | Stop reason: SDUPTHER

## 2020-03-19 RX ORDER — SERTRALINE HYDROCHLORIDE 25 MG/1
TABLET, FILM COATED ORAL
Qty: 90 TABLET | Refills: 3 | Status: SHIPPED | OUTPATIENT
Start: 2020-03-19 | End: 2021-03-26

## 2020-04-22 ENCOUNTER — TELEPHONE (OUTPATIENT)
Dept: INTERNAL MEDICINE | Age: 70
End: 2020-04-22

## 2020-04-22 RX ORDER — HYDROCODONE BITARTRATE AND ACETAMINOPHEN 7.5; 325 MG/1; MG/1
1 TABLET ORAL 2 TIMES DAILY PRN
Qty: 60 TABLET | Refills: 0 | Status: SHIPPED | OUTPATIENT
Start: 2020-04-22 | End: 2020-05-19 | Stop reason: SDUPTHER

## 2020-05-15 RX ORDER — LEVOCETIRIZINE DIHYDROCHLORIDE 5 MG/1
TABLET, FILM COATED ORAL
Qty: 90 TABLET | Refills: 3 | Status: SHIPPED | OUTPATIENT
Start: 2020-05-15 | End: 2021-05-18

## 2020-05-18 ENCOUNTER — TELEPHONE (OUTPATIENT)
Dept: INTERNAL MEDICINE | Age: 70
End: 2020-05-18

## 2020-05-18 DIAGNOSIS — E55.9 VITAMIN D DEFICIENCY: ICD-10-CM

## 2020-05-18 DIAGNOSIS — E03.9 ACQUIRED HYPOTHYROIDISM: ICD-10-CM

## 2020-05-18 DIAGNOSIS — D51.8 OTHER VITAMIN B12 DEFICIENCY ANEMIA: ICD-10-CM

## 2020-05-18 DIAGNOSIS — E78.00 PURE HYPERCHOLESTEROLEMIA: ICD-10-CM

## 2020-05-18 LAB
ALBUMIN SERPL-MCNC: 4.3 G/DL (ref 3.5–5.2)
ALP BLD-CCNC: 65 U/L (ref 35–104)
ALT SERPL-CCNC: 14 U/L (ref 5–33)
ANION GAP SERPL CALCULATED.3IONS-SCNC: 15 MMOL/L (ref 7–19)
AST SERPL-CCNC: 26 U/L (ref 5–32)
BILIRUB SERPL-MCNC: 0.5 MG/DL (ref 0.2–1.2)
BUN BLDV-MCNC: 9 MG/DL (ref 8–23)
CALCIUM SERPL-MCNC: 9.2 MG/DL (ref 8.8–10.2)
CHLORIDE BLD-SCNC: 100 MMOL/L (ref 98–111)
CHOLESTEROL, TOTAL: 145 MG/DL (ref 160–199)
CO2: 27 MMOL/L (ref 22–29)
CREAT SERPL-MCNC: 0.7 MG/DL (ref 0.5–0.9)
GFR NON-AFRICAN AMERICAN: >60
GLUCOSE BLD-MCNC: 103 MG/DL (ref 74–109)
HCT VFR BLD CALC: 31.9 % (ref 37–47)
HDLC SERPL-MCNC: 79 MG/DL (ref 65–121)
HEMOGLOBIN: 10.3 G/DL (ref 12–16)
LDL CHOLESTEROL CALCULATED: 55 MG/DL
MCH RBC QN AUTO: 29.1 PG (ref 27–31)
MCHC RBC AUTO-ENTMCNC: 32.3 G/DL (ref 33–37)
MCV RBC AUTO: 90.1 FL (ref 81–99)
PDW BLD-RTO: 13.6 % (ref 11.5–14.5)
PLATELET # BLD: 328 K/UL (ref 130–400)
PMV BLD AUTO: 10.8 FL (ref 9.4–12.3)
POTASSIUM SERPL-SCNC: 4.4 MMOL/L (ref 3.5–5)
RBC # BLD: 3.54 M/UL (ref 4.2–5.4)
SODIUM BLD-SCNC: 142 MMOL/L (ref 136–145)
TOTAL PROTEIN: 7 G/DL (ref 6.6–8.7)
TRIGL SERPL-MCNC: 55 MG/DL (ref 0–149)
TSH SERPL DL<=0.05 MIU/L-ACNC: 1.39 UIU/ML (ref 0.27–4.2)
VITAMIN D 25-HYDROXY: 29.1 NG/ML
WBC # BLD: 7.2 K/UL (ref 4.8–10.8)

## 2020-05-19 RX ORDER — HYDROCODONE BITARTRATE AND ACETAMINOPHEN 7.5; 325 MG/1; MG/1
1 TABLET ORAL 2 TIMES DAILY PRN
Qty: 60 TABLET | Refills: 0 | Status: SHIPPED | OUTPATIENT
Start: 2020-05-19 | End: 2020-06-16

## 2020-05-26 ENCOUNTER — OFFICE VISIT (OUTPATIENT)
Dept: INTERNAL MEDICINE | Age: 70
End: 2020-05-26
Payer: MEDICARE

## 2020-05-26 VITALS
DIASTOLIC BLOOD PRESSURE: 62 MMHG | OXYGEN SATURATION: 95 % | WEIGHT: 94 LBS | HEART RATE: 74 BPM | HEIGHT: 63 IN | BODY MASS INDEX: 16.66 KG/M2 | SYSTOLIC BLOOD PRESSURE: 120 MMHG

## 2020-05-26 PROCEDURE — G0439 PPPS, SUBSEQ VISIT: HCPCS | Performed by: INTERNAL MEDICINE

## 2020-05-26 PROCEDURE — 96372 THER/PROPH/DIAG INJ SC/IM: CPT | Performed by: INTERNAL MEDICINE

## 2020-05-26 PROCEDURE — G8400 PT W/DXA NO RESULTS DOC: HCPCS | Performed by: INTERNAL MEDICINE

## 2020-05-26 PROCEDURE — G8427 DOCREV CUR MEDS BY ELIG CLIN: HCPCS | Performed by: INTERNAL MEDICINE

## 2020-05-26 PROCEDURE — G8419 CALC BMI OUT NRM PARAM NOF/U: HCPCS | Performed by: INTERNAL MEDICINE

## 2020-05-26 PROCEDURE — 4040F PNEUMOC VAC/ADMIN/RCVD: CPT | Performed by: INTERNAL MEDICINE

## 2020-05-26 PROCEDURE — 99213 OFFICE O/P EST LOW 20 MIN: CPT | Performed by: INTERNAL MEDICINE

## 2020-05-26 PROCEDURE — 1090F PRES/ABSN URINE INCON ASSESS: CPT | Performed by: INTERNAL MEDICINE

## 2020-05-26 PROCEDURE — 1123F ACP DISCUSS/DSCN MKR DOCD: CPT | Performed by: INTERNAL MEDICINE

## 2020-05-26 PROCEDURE — 1036F TOBACCO NON-USER: CPT | Performed by: INTERNAL MEDICINE

## 2020-05-26 PROCEDURE — 3017F COLORECTAL CA SCREEN DOC REV: CPT | Performed by: INTERNAL MEDICINE

## 2020-05-26 RX ORDER — DOXYCYCLINE HYCLATE 100 MG
100 TABLET ORAL 2 TIMES DAILY
Qty: 20 TABLET | Refills: 0 | Status: SHIPPED | OUTPATIENT
Start: 2020-05-26 | End: 2020-06-05

## 2020-05-26 RX ORDER — CYANOCOBALAMIN 1000 UG/ML
1000 INJECTION INTRAMUSCULAR; SUBCUTANEOUS ONCE
Status: COMPLETED | OUTPATIENT
Start: 2020-05-26 | End: 2020-05-26

## 2020-05-26 RX ADMIN — CYANOCOBALAMIN 1000 MCG: 1000 INJECTION INTRAMUSCULAR; SUBCUTANEOUS at 14:16

## 2020-05-26 ASSESSMENT — PATIENT HEALTH QUESTIONNAIRE - PHQ9
2. FEELING DOWN, DEPRESSED OR HOPELESS: 0
SUM OF ALL RESPONSES TO PHQ QUESTIONS 1-9: 2
1. LITTLE INTEREST OR PLEASURE IN DOING THINGS: 0
SUM OF ALL RESPONSES TO PHQ9 QUESTIONS 1 & 2: 0
SUM OF ALL RESPONSES TO PHQ QUESTIONS 1-9: 0
SUM OF ALL RESPONSES TO PHQ QUESTIONS 1-9: 2
SUM OF ALL RESPONSES TO PHQ QUESTIONS 1-9: 0

## 2020-05-26 ASSESSMENT — ENCOUNTER SYMPTOMS
EYE REDNESS: 0
ABDOMINAL DISTENTION: 0
SINUS PRESSURE: 0
WHEEZING: 1
EYE DISCHARGE: 0
ABDOMINAL PAIN: 0
SHORTNESS OF BREATH: 1
COUGH: 1
BACK PAIN: 0
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
## 2020-05-26 NOTE — PATIENT INSTRUCTIONS
Personalized Preventive Plan for Angel Mccann - 5/26/2020  Medicare offers a range of preventive health benefits. Some of the tests and screenings are paid in full while other may be subject to a deductible, co-insurance, and/or copay. Some of these benefits include a comprehensive review of your medical history including lifestyle, illnesses that may run in your family, and various assessments and screenings as appropriate. After reviewing your medical record and screening and assessments performed today your provider may have ordered immunizations, labs, imaging, and/or referrals for you. A list of these orders (if applicable) as well as your Preventive Care list are included within your After Visit Summary for your review. Other Preventive Recommendations:    · A preventive eye exam performed by an eye specialist is recommended every 1-2 years to screen for glaucoma; cataracts, macular degeneration, and other eye disorders. · A preventive dental visit is recommended every 6 months. · Try to get at least 150 minutes of exercise per week or 10,000 steps per day on a pedometer . · Order or download the FREE \"Exercise & Physical Activity: Your Everyday Guide\" from [employer]. Call 4-493.912.2308 or search [employer] online. · You need 3741-9299 mg of calcium and 8456-7218 IU of vitamin D per day. It is possible to meet your calcium requirement with diet alone, but a vitamin D supplement is usually necessary to meet this goal.  · When exposed to the sun, use a sunscreen that protects against both UVA and UVB radiation with an SPF of 30 or greater. Reapply every 2 to 3 hours or after sweating, drying off with a towel, or swimming. · Always wear a seat belt when traveling in a car. Always wear a helmet when riding a bicycle or motorcycle.

## 2020-05-28 PROBLEM — J47.9 BRONCHIECTASIS WITHOUT COMPLICATION (HCC): Status: ACTIVE | Noted: 2020-05-28

## 2020-06-16 ENCOUNTER — TELEPHONE (OUTPATIENT)
Dept: INTERNAL MEDICINE | Age: 70
End: 2020-06-16

## 2020-06-16 RX ORDER — HYDROCODONE BITARTRATE AND ACETAMINOPHEN 7.5; 325 MG/1; MG/1
1 TABLET ORAL 2 TIMES DAILY PRN
Qty: 60 TABLET | Refills: 0 | Status: SHIPPED | OUTPATIENT
Start: 2020-06-16 | End: 2020-08-13

## 2020-07-15 RX ORDER — DEXLANSOPRAZOLE 60 MG/1
CAPSULE, DELAYED RELEASE ORAL
Qty: 90 CAPSULE | Refills: 3 | Status: SHIPPED | OUTPATIENT
Start: 2020-07-15 | End: 2021-03-23 | Stop reason: SDUPTHER

## 2020-08-13 RX ORDER — HYDROCODONE BITARTRATE AND ACETAMINOPHEN 7.5; 325 MG/1; MG/1
1 TABLET ORAL 2 TIMES DAILY PRN
Qty: 60 TABLET | Refills: 0 | Status: SHIPPED | OUTPATIENT
Start: 2020-08-13 | End: 2020-09-14

## 2020-09-01 ENCOUNTER — OFFICE VISIT (OUTPATIENT)
Dept: INTERNAL MEDICINE | Age: 70
End: 2020-09-01
Payer: MEDICARE

## 2020-09-01 VITALS
OXYGEN SATURATION: 96 % | HEIGHT: 63 IN | BODY MASS INDEX: 16.3 KG/M2 | SYSTOLIC BLOOD PRESSURE: 132 MMHG | DIASTOLIC BLOOD PRESSURE: 60 MMHG | WEIGHT: 92 LBS | HEART RATE: 70 BPM

## 2020-09-01 PROCEDURE — G8400 PT W/DXA NO RESULTS DOC: HCPCS | Performed by: INTERNAL MEDICINE

## 2020-09-01 PROCEDURE — G8427 DOCREV CUR MEDS BY ELIG CLIN: HCPCS | Performed by: INTERNAL MEDICINE

## 2020-09-01 PROCEDURE — 3023F SPIROM DOC REV: CPT | Performed by: INTERNAL MEDICINE

## 2020-09-01 PROCEDURE — G8926 SPIRO NO PERF OR DOC: HCPCS | Performed by: INTERNAL MEDICINE

## 2020-09-01 PROCEDURE — 96372 THER/PROPH/DIAG INJ SC/IM: CPT | Performed by: INTERNAL MEDICINE

## 2020-09-01 PROCEDURE — 99214 OFFICE O/P EST MOD 30 MIN: CPT | Performed by: INTERNAL MEDICINE

## 2020-09-01 PROCEDURE — 4040F PNEUMOC VAC/ADMIN/RCVD: CPT | Performed by: INTERNAL MEDICINE

## 2020-09-01 PROCEDURE — G8419 CALC BMI OUT NRM PARAM NOF/U: HCPCS | Performed by: INTERNAL MEDICINE

## 2020-09-01 PROCEDURE — 4004F PT TOBACCO SCREEN RCVD TLK: CPT | Performed by: INTERNAL MEDICINE

## 2020-09-01 PROCEDURE — 1123F ACP DISCUSS/DSCN MKR DOCD: CPT | Performed by: INTERNAL MEDICINE

## 2020-09-01 PROCEDURE — 1090F PRES/ABSN URINE INCON ASSESS: CPT | Performed by: INTERNAL MEDICINE

## 2020-09-01 PROCEDURE — 3017F COLORECTAL CA SCREEN DOC REV: CPT | Performed by: INTERNAL MEDICINE

## 2020-09-01 RX ORDER — CYANOCOBALAMIN 1000 UG/ML
1000 INJECTION INTRAMUSCULAR; SUBCUTANEOUS ONCE
Status: COMPLETED | OUTPATIENT
Start: 2020-09-01 | End: 2020-09-01

## 2020-09-01 RX ORDER — METHYLPREDNISOLONE ACETATE 40 MG/ML
40 INJECTION, SUSPENSION INTRA-ARTICULAR; INTRALESIONAL; INTRAMUSCULAR; SOFT TISSUE ONCE
Status: COMPLETED | OUTPATIENT
Start: 2020-09-01 | End: 2020-09-01

## 2020-09-01 RX ADMIN — CYANOCOBALAMIN 1000 MCG: 1000 INJECTION INTRAMUSCULAR; SUBCUTANEOUS at 14:42

## 2020-09-01 RX ADMIN — METHYLPREDNISOLONE ACETATE 40 MG: 40 INJECTION, SUSPENSION INTRA-ARTICULAR; INTRALESIONAL; INTRAMUSCULAR; SOFT TISSUE at 14:43

## 2020-09-01 RX ADMIN — CYANOCOBALAMIN 1000 MCG: 1000 INJECTION INTRAMUSCULAR; SUBCUTANEOUS at 14:41
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
## 2020-09-13 ASSESSMENT — ENCOUNTER SYMPTOMS
SHORTNESS OF BREATH: 1
COUGH: 1
ABDOMINAL DISTENTION: 0
BACK PAIN: 0
EYE DISCHARGE: 0
EYE REDNESS: 0
ABDOMINAL PAIN: 0
SINUS PRESSURE: 0

## 2020-09-14 RX ORDER — HYDROCODONE BITARTRATE AND ACETAMINOPHEN 7.5; 325 MG/1; MG/1
1 TABLET ORAL 2 TIMES DAILY PRN
Qty: 60 TABLET | Refills: 0 | Status: SHIPPED | OUTPATIENT
Start: 2020-09-14 | End: 2020-10-16

## 2020-10-16 RX ORDER — HYDROCODONE BITARTRATE AND ACETAMINOPHEN 7.5; 325 MG/1; MG/1
1 TABLET ORAL 2 TIMES DAILY PRN
Qty: 60 TABLET | Refills: 0 | Status: SHIPPED | OUTPATIENT
Start: 2020-10-16 | End: 2020-11-13

## 2020-11-13 RX ORDER — HYDROCODONE BITARTRATE AND ACETAMINOPHEN 7.5; 325 MG/1; MG/1
1 TABLET ORAL 2 TIMES DAILY PRN
Qty: 60 TABLET | Refills: 0 | Status: SHIPPED | OUTPATIENT
Start: 2020-11-13 | End: 2020-12-16

## 2020-11-13 NOTE — TELEPHONE ENCOUNTER
Martínez Barnard called to request a refill on her medication. Last office visit : 9/1/2020   Next office visit : 12/7/2020     Last UDS:   Amphetamine Screen, Urine   Date Value Ref Range Status   05/10/2019 neg  Final     Barbiturate Screen, Urine   Date Value Ref Range Status   05/10/2019 neg  Final     Benzodiazepine Screen, Urine   Date Value Ref Range Status   05/10/2019 pos  Final     Buprenorphine Urine   Date Value Ref Range Status   05/10/2019 neg  Final     Cocaine Metabolite Screen, Urine   Date Value Ref Range Status   05/10/2019 neg  Final     Gabapentin Screen, Urine   Date Value Ref Range Status   05/10/2019 na  Final     MDMA, Urine   Date Value Ref Range Status   05/10/2019 neg  Final     Methamphetamine, Urine   Date Value Ref Range Status   05/10/2019 neg  Final     Opiate Scrn, Ur   Date Value Ref Range Status   05/10/2019 pos  Final     Oxycodone Screen, Ur   Date Value Ref Range Status   05/10/2019 neg  Final     PCP Screen, Urine   Date Value Ref Range Status   05/10/2019 neg  Final     Propoxyphene Screen, Urine   Date Value Ref Range Status   05/10/2019 neg  Final     THC Screen, Urine   Date Value Ref Range Status   05/10/2019 neg  Final     Tricyclic Antidepressants, Urine   Date Value Ref Range Status   05/10/2019 neg  Final       Last Tona Greaser: 8/12/20  Medication Contract: 2/15/2019     Requested Prescriptions     Pending Prescriptions Disp Refills    HYDROcodone-acetaminophen ([place]) 7.5-325 MG per tablet [Pharmacy Med Name: HYDROCODON-APAP 7.5-325 7.5-325 Tablet] 60 tablet 0     Sig: Take 1 tablet by mouth 2 times daily as needed for Pain for up to 30 days. Please approve or refuse this medication.    Tigist Manriquez

## 2020-11-20 RX ORDER — ATORVASTATIN CALCIUM 40 MG/1
40 TABLET, FILM COATED ORAL DAILY
Qty: 90 TABLET | Refills: 3 | Status: SHIPPED | OUTPATIENT
Start: 2020-11-20 | End: 2021-12-03

## 2020-12-07 ENCOUNTER — OFFICE VISIT (OUTPATIENT)
Dept: INTERNAL MEDICINE | Age: 70
End: 2020-12-07
Payer: MEDICARE

## 2020-12-07 VITALS
HEIGHT: 63 IN | WEIGHT: 93 LBS | OXYGEN SATURATION: 96 % | DIASTOLIC BLOOD PRESSURE: 60 MMHG | HEART RATE: 70 BPM | SYSTOLIC BLOOD PRESSURE: 134 MMHG | BODY MASS INDEX: 16.48 KG/M2

## 2020-12-07 PROCEDURE — G8419 CALC BMI OUT NRM PARAM NOF/U: HCPCS | Performed by: INTERNAL MEDICINE

## 2020-12-07 PROCEDURE — 4004F PT TOBACCO SCREEN RCVD TLK: CPT | Performed by: INTERNAL MEDICINE

## 2020-12-07 PROCEDURE — 3023F SPIROM DOC REV: CPT | Performed by: INTERNAL MEDICINE

## 2020-12-07 PROCEDURE — 99214 OFFICE O/P EST MOD 30 MIN: CPT | Performed by: INTERNAL MEDICINE

## 2020-12-07 PROCEDURE — G8427 DOCREV CUR MEDS BY ELIG CLIN: HCPCS | Performed by: INTERNAL MEDICINE

## 2020-12-07 PROCEDURE — G8400 PT W/DXA NO RESULTS DOC: HCPCS | Performed by: INTERNAL MEDICINE

## 2020-12-07 PROCEDURE — 3017F COLORECTAL CA SCREEN DOC REV: CPT | Performed by: INTERNAL MEDICINE

## 2020-12-07 PROCEDURE — G8484 FLU IMMUNIZE NO ADMIN: HCPCS | Performed by: INTERNAL MEDICINE

## 2020-12-07 PROCEDURE — G0008 ADMIN INFLUENZA VIRUS VAC: HCPCS | Performed by: INTERNAL MEDICINE

## 2020-12-07 PROCEDURE — 1090F PRES/ABSN URINE INCON ASSESS: CPT | Performed by: INTERNAL MEDICINE

## 2020-12-07 PROCEDURE — G8926 SPIRO NO PERF OR DOC: HCPCS | Performed by: INTERNAL MEDICINE

## 2020-12-07 PROCEDURE — 1123F ACP DISCUSS/DSCN MKR DOCD: CPT | Performed by: INTERNAL MEDICINE

## 2020-12-07 PROCEDURE — 90694 VACC AIIV4 NO PRSRV 0.5ML IM: CPT | Performed by: INTERNAL MEDICINE

## 2020-12-07 PROCEDURE — 4040F PNEUMOC VAC/ADMIN/RCVD: CPT | Performed by: INTERNAL MEDICINE

## 2020-12-07 PROCEDURE — 96372 THER/PROPH/DIAG INJ SC/IM: CPT | Performed by: INTERNAL MEDICINE

## 2020-12-07 RX ORDER — CYANOCOBALAMIN 1000 UG/ML
1000 INJECTION INTRAMUSCULAR; SUBCUTANEOUS ONCE
Status: COMPLETED | OUTPATIENT
Start: 2020-12-07 | End: 2020-12-07

## 2020-12-07 RX ADMIN — CYANOCOBALAMIN 1000 MCG: 1000 INJECTION INTRAMUSCULAR; SUBCUTANEOUS at 15:00
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
## 2020-12-13 PROBLEM — G44.209 TENSION TYPE HEADACHE: Status: ACTIVE | Noted: 2020-12-13

## 2020-12-13 ASSESSMENT — ENCOUNTER SYMPTOMS
EYE DISCHARGE: 0
SHORTNESS OF BREATH: 1
BACK PAIN: 0
ABDOMINAL DISTENTION: 0
ABDOMINAL PAIN: 0
EYE REDNESS: 0
SINUS PRESSURE: 0
COUGH: 1

## 2020-12-16 RX ORDER — HYDROCODONE BITARTRATE AND ACETAMINOPHEN 7.5; 325 MG/1; MG/1
1 TABLET ORAL 2 TIMES DAILY PRN
Qty: 60 TABLET | Refills: 0 | Status: SHIPPED | OUTPATIENT
Start: 2020-12-16 | End: 2021-01-21 | Stop reason: SDUPTHER

## 2021-01-18 DIAGNOSIS — M15.9 PRIMARY OSTEOARTHRITIS INVOLVING MULTIPLE JOINTS: ICD-10-CM

## 2021-01-21 DIAGNOSIS — M15.9 PRIMARY OSTEOARTHRITIS INVOLVING MULTIPLE JOINTS: ICD-10-CM

## 2021-01-21 RX ORDER — HYDROCODONE BITARTRATE AND ACETAMINOPHEN 7.5; 325 MG/1; MG/1
1 TABLET ORAL 2 TIMES DAILY PRN
Qty: 60 TABLET | Refills: 0 | Status: SHIPPED | OUTPATIENT
Start: 2021-01-21 | End: 2021-02-26 | Stop reason: SDUPTHER

## 2021-01-21 RX ORDER — HYDROCODONE BITARTRATE AND ACETAMINOPHEN 7.5; 325 MG/1; MG/1
1 TABLET ORAL 2 TIMES DAILY PRN
Qty: 60 TABLET | Refills: 0 | Status: SHIPPED | OUTPATIENT
Start: 2021-01-21 | End: 2021-01-21 | Stop reason: SDUPTHER

## 2021-01-21 NOTE — TELEPHONE ENCOUNTER
Salina Mijares is requesting a refill of their   Requested Prescriptions     Pending Prescriptions Disp Refills    HYDROcodone-acetaminophen (NORCO) 7.5-325 MG per tablet 60 tablet 0     Sig: Take 1 tablet by mouth 2 times daily as needed for Pain for up to 30 days. . Please advise.       Last Appt:  Visit date not found  Next Appt:   Visit date not found  Preferred pharmacy:

## 2021-01-22 RX ORDER — HYDROCODONE BITARTRATE AND ACETAMINOPHEN 7.5; 325 MG/1; MG/1
1 TABLET ORAL 2 TIMES DAILY PRN
Qty: 60 TABLET | Refills: 0 | Status: SHIPPED | OUTPATIENT
Start: 2021-01-22 | End: 2021-02-26 | Stop reason: SDUPTHER

## 2021-02-05 DIAGNOSIS — E03.9 ACQUIRED HYPOTHYROIDISM: ICD-10-CM

## 2021-02-08 RX ORDER — LEVOTHYROXINE SODIUM 0.03 MG/1
TABLET ORAL
Qty: 90 TABLET | Refills: 3 | Status: SHIPPED | OUTPATIENT
Start: 2021-02-08 | End: 2022-03-02

## 2021-02-26 DIAGNOSIS — M15.9 PRIMARY OSTEOARTHRITIS INVOLVING MULTIPLE JOINTS: ICD-10-CM

## 2021-02-26 RX ORDER — HYDROCODONE BITARTRATE AND ACETAMINOPHEN 7.5; 325 MG/1; MG/1
1 TABLET ORAL 2 TIMES DAILY PRN
Qty: 60 TABLET | Refills: 0 | Status: SHIPPED | OUTPATIENT
Start: 2021-02-26 | End: 2021-03-24 | Stop reason: SDUPTHER

## 2021-03-23 DIAGNOSIS — M15.9 PRIMARY OSTEOARTHRITIS INVOLVING MULTIPLE JOINTS: ICD-10-CM

## 2021-03-24 RX ORDER — HYDROCODONE BITARTRATE AND ACETAMINOPHEN 7.5; 325 MG/1; MG/1
1 TABLET ORAL 2 TIMES DAILY PRN
Qty: 60 TABLET | Refills: 0 | Status: SHIPPED | OUTPATIENT
Start: 2021-03-24 | End: 2021-04-21

## 2021-03-24 RX ORDER — DEXLANSOPRAZOLE 60 MG/1
CAPSULE, DELAYED RELEASE ORAL
Qty: 90 CAPSULE | Refills: 3 | Status: SHIPPED | OUTPATIENT
Start: 2021-03-24 | End: 2022-05-30 | Stop reason: ALTCHOICE

## 2021-03-26 DIAGNOSIS — F34.1 DYSTHYMIA: ICD-10-CM

## 2021-03-26 RX ORDER — SERTRALINE HYDROCHLORIDE 25 MG/1
TABLET, FILM COATED ORAL
Qty: 90 TABLET | Refills: 3 | Status: SHIPPED | OUTPATIENT
Start: 2021-03-26 | End: 2022-01-03

## 2021-04-20 DIAGNOSIS — M15.9 PRIMARY OSTEOARTHRITIS INVOLVING MULTIPLE JOINTS: ICD-10-CM

## 2021-04-21 RX ORDER — HYDROCODONE BITARTRATE AND ACETAMINOPHEN 7.5; 325 MG/1; MG/1
1 TABLET ORAL 2 TIMES DAILY PRN
Qty: 60 TABLET | Refills: 0 | Status: SHIPPED | OUTPATIENT
Start: 2021-04-21 | End: 2021-05-17 | Stop reason: SDUPTHER

## 2021-05-13 DIAGNOSIS — E55.9 VITAMIN D DEFICIENCY: ICD-10-CM

## 2021-05-13 DIAGNOSIS — D51.8 OTHER VITAMIN B12 DEFICIENCY ANEMIA: ICD-10-CM

## 2021-05-13 DIAGNOSIS — D50.9 IRON DEFICIENCY ANEMIA, UNSPECIFIED IRON DEFICIENCY ANEMIA TYPE: ICD-10-CM

## 2021-05-13 DIAGNOSIS — R53.83 FATIGUE, UNSPECIFIED TYPE: ICD-10-CM

## 2021-05-13 LAB
ALBUMIN SERPL-MCNC: 4.1 G/DL (ref 3.5–5.2)
ALP BLD-CCNC: 69 U/L (ref 35–104)
ALT SERPL-CCNC: 13 U/L (ref 5–33)
ANION GAP SERPL CALCULATED.3IONS-SCNC: 8 MMOL/L (ref 7–19)
AST SERPL-CCNC: 27 U/L (ref 5–32)
BILIRUB SERPL-MCNC: 0.4 MG/DL (ref 0.2–1.2)
BUN BLDV-MCNC: 4 MG/DL (ref 8–23)
CALCIUM SERPL-MCNC: 9 MG/DL (ref 8.8–10.2)
CHLORIDE BLD-SCNC: 103 MMOL/L (ref 98–111)
CO2: 28 MMOL/L (ref 22–29)
CREAT SERPL-MCNC: 0.6 MG/DL (ref 0.5–0.9)
FERRITIN: 57.4 NG/ML (ref 13–150)
FOLATE: 9.9 NG/ML (ref 4.8–37.3)
GFR AFRICAN AMERICAN: >59
GFR NON-AFRICAN AMERICAN: >60
GLUCOSE BLD-MCNC: 98 MG/DL (ref 74–109)
HCT VFR BLD CALC: 30.3 % (ref 37–47)
HEMOGLOBIN: 9.7 G/DL (ref 12–16)
IRON: 89 UG/DL (ref 37–145)
MCH RBC QN AUTO: 28.7 PG (ref 27–31)
MCHC RBC AUTO-ENTMCNC: 32 G/DL (ref 33–37)
MCV RBC AUTO: 89.6 FL (ref 81–99)
PDW BLD-RTO: 14.3 % (ref 11.5–14.5)
PLATELET # BLD: 358 K/UL (ref 130–400)
PMV BLD AUTO: 10.7 FL (ref 9.4–12.3)
POTASSIUM SERPL-SCNC: 4.4 MMOL/L (ref 3.5–5)
RBC # BLD: 3.38 M/UL (ref 4.2–5.4)
SODIUM BLD-SCNC: 139 MMOL/L (ref 136–145)
TOTAL PROTEIN: 6.5 G/DL (ref 6.6–8.7)
TSH SERPL DL<=0.05 MIU/L-ACNC: 1 UIU/ML (ref 0.27–4.2)
VITAMIN B-12: 636 PG/ML (ref 211–946)
VITAMIN D 25-HYDROXY: 20.3 NG/ML
WBC # BLD: 6.3 K/UL (ref 4.8–10.8)

## 2021-05-17 DIAGNOSIS — M15.9 PRIMARY OSTEOARTHRITIS INVOLVING MULTIPLE JOINTS: ICD-10-CM

## 2021-05-17 RX ORDER — HYDROCODONE BITARTRATE AND ACETAMINOPHEN 7.5; 325 MG/1; MG/1
1 TABLET ORAL 2 TIMES DAILY PRN
Qty: 60 TABLET | Refills: 0 | Status: SHIPPED | OUTPATIENT
Start: 2021-05-17 | End: 2021-06-23

## 2021-05-18 DIAGNOSIS — J30.89 NON-SEASONAL ALLERGIC RHINITIS: ICD-10-CM

## 2021-05-18 RX ORDER — LEVOCETIRIZINE DIHYDROCHLORIDE 5 MG/1
TABLET, FILM COATED ORAL
Qty: 90 TABLET | Refills: 3 | Status: SHIPPED | OUTPATIENT
Start: 2021-05-18 | End: 2022-03-02

## 2021-05-20 RX ORDER — ERGOCALCIFEROL 1.25 MG/1
50000 CAPSULE ORAL WEEKLY
Qty: 12 CAPSULE | Refills: 3 | Status: SHIPPED | OUTPATIENT
Start: 2021-05-20 | End: 2021-12-03

## 2021-06-22 DIAGNOSIS — M15.9 PRIMARY OSTEOARTHRITIS INVOLVING MULTIPLE JOINTS: ICD-10-CM

## 2021-06-22 RX ORDER — HYDROCODONE BITARTRATE AND ACETAMINOPHEN 7.5; 325 MG/1; MG/1
1 TABLET ORAL 2 TIMES DAILY PRN
Qty: 60 TABLET | Refills: 0 | Status: CANCELLED | OUTPATIENT
Start: 2021-06-22 | End: 2021-07-22

## 2021-06-22 NOTE — TELEPHONE ENCOUNTER
Grady Squires called to request a refill on her medication. Last office visit : 12/7/2020   Next office visit : 6/23/2021     Last UDS:   Amphetamine Screen, Urine   Date Value Ref Range Status   05/10/2019 neg  Final     Barbiturate Screen, Urine   Date Value Ref Range Status   05/10/2019 neg  Final     Benzodiazepine Screen, Urine   Date Value Ref Range Status   05/10/2019 pos  Final     Buprenorphine Urine   Date Value Ref Range Status   05/10/2019 neg  Final     Cocaine Metabolite Screen, Urine   Date Value Ref Range Status   05/10/2019 neg  Final     Gabapentin Screen, Urine   Date Value Ref Range Status   05/10/2019 na  Final     MDMA, Urine   Date Value Ref Range Status   05/10/2019 neg  Final     Methamphetamine, Urine   Date Value Ref Range Status   05/10/2019 neg  Final     Opiate Scrn, Ur   Date Value Ref Range Status   05/10/2019 pos  Final     Oxycodone Screen, Ur   Date Value Ref Range Status   05/10/2019 neg  Final     PCP Screen, Urine   Date Value Ref Range Status   05/10/2019 neg  Final     Propoxyphene Screen, Urine   Date Value Ref Range Status   05/10/2019 neg  Final     THC Screen, Urine   Date Value Ref Range Status   05/10/2019 neg  Final     Tricyclic Antidepressants, Urine   Date Value Ref Range Status   05/10/2019 neg  Final       Last [facility]: 06/01/2021  Medication Contract: 02/05/2019     Requested Prescriptions     Pending Prescriptions Disp Refills    HYDROcodone-acetaminophen (NORCO) 7.5-325 MG per tablet 60 tablet 0     Sig: Take 1 tablet by mouth 2 times daily as needed for Pain for up to 30 days. Please approve or refuse this medication.    Holli Glover

## 2021-06-23 ENCOUNTER — OFFICE VISIT (OUTPATIENT)
Dept: INTERNAL MEDICINE | Age: 71
End: 2021-06-23
Payer: MEDICARE

## 2021-06-23 ENCOUNTER — TRANSCRIBE ORDERS (OUTPATIENT)
Dept: ADMINISTRATIVE | Facility: HOSPITAL | Age: 71
End: 2021-06-23

## 2021-06-23 VITALS
BODY MASS INDEX: 15.41 KG/M2 | DIASTOLIC BLOOD PRESSURE: 74 MMHG | OXYGEN SATURATION: 96 % | HEIGHT: 63 IN | WEIGHT: 87 LBS | HEART RATE: 92 BPM | SYSTOLIC BLOOD PRESSURE: 130 MMHG

## 2021-06-23 DIAGNOSIS — Z12.11 SCREEN FOR COLON CANCER: ICD-10-CM

## 2021-06-23 DIAGNOSIS — M15.9 PRIMARY OSTEOARTHRITIS INVOLVING MULTIPLE JOINTS: ICD-10-CM

## 2021-06-23 DIAGNOSIS — Z00.00 ROUTINE GENERAL MEDICAL EXAMINATION AT A HEALTH CARE FACILITY: ICD-10-CM

## 2021-06-23 DIAGNOSIS — Z87.891 PERSONAL HISTORY OF TOBACCO USE: ICD-10-CM

## 2021-06-23 DIAGNOSIS — M47.22 OSTEOARTHRITIS OF SPINE WITH RADICULOPATHY, CERVICAL REGION: ICD-10-CM

## 2021-06-23 DIAGNOSIS — Z12.31 SCREENING MAMMOGRAM, ENCOUNTER FOR: ICD-10-CM

## 2021-06-23 DIAGNOSIS — D51.8 OTHER VITAMIN B12 DEFICIENCY ANEMIA: ICD-10-CM

## 2021-06-23 DIAGNOSIS — Z00.00 MEDICARE ANNUAL WELLNESS VISIT, SUBSEQUENT: Primary | ICD-10-CM

## 2021-06-23 DIAGNOSIS — Z12.31 ENCOUNTER FOR SCREENING MAMMOGRAM FOR MALIGNANT NEOPLASM OF BREAST: Primary | ICD-10-CM

## 2021-06-23 DIAGNOSIS — K21.00 GASTROESOPHAGEAL REFLUX DISEASE WITH ESOPHAGITIS WITHOUT HEMORRHAGE: ICD-10-CM

## 2021-06-23 DIAGNOSIS — J41.1 MUCOPURULENT CHRONIC BRONCHITIS (HCC): ICD-10-CM

## 2021-06-23 PROCEDURE — 1090F PRES/ABSN URINE INCON ASSESS: CPT | Performed by: INTERNAL MEDICINE

## 2021-06-23 PROCEDURE — 4004F PT TOBACCO SCREEN RCVD TLK: CPT | Performed by: INTERNAL MEDICINE

## 2021-06-23 PROCEDURE — G8419 CALC BMI OUT NRM PARAM NOF/U: HCPCS | Performed by: INTERNAL MEDICINE

## 2021-06-23 PROCEDURE — G8400 PT W/DXA NO RESULTS DOC: HCPCS | Performed by: INTERNAL MEDICINE

## 2021-06-23 PROCEDURE — 4040F PNEUMOC VAC/ADMIN/RCVD: CPT | Performed by: INTERNAL MEDICINE

## 2021-06-23 PROCEDURE — G8427 DOCREV CUR MEDS BY ELIG CLIN: HCPCS | Performed by: INTERNAL MEDICINE

## 2021-06-23 PROCEDURE — G0439 PPPS, SUBSEQ VISIT: HCPCS | Performed by: INTERNAL MEDICINE

## 2021-06-23 PROCEDURE — 1123F ACP DISCUSS/DSCN MKR DOCD: CPT | Performed by: INTERNAL MEDICINE

## 2021-06-23 PROCEDURE — 96372 THER/PROPH/DIAG INJ SC/IM: CPT | Performed by: INTERNAL MEDICINE

## 2021-06-23 PROCEDURE — 99213 OFFICE O/P EST LOW 20 MIN: CPT | Performed by: INTERNAL MEDICINE

## 2021-06-23 PROCEDURE — 3017F COLORECTAL CA SCREEN DOC REV: CPT | Performed by: INTERNAL MEDICINE

## 2021-06-23 PROCEDURE — G8926 SPIRO NO PERF OR DOC: HCPCS | Performed by: INTERNAL MEDICINE

## 2021-06-23 PROCEDURE — G0296 VISIT TO DETERM LDCT ELIG: HCPCS | Performed by: INTERNAL MEDICINE

## 2021-06-23 PROCEDURE — 3023F SPIROM DOC REV: CPT | Performed by: INTERNAL MEDICINE

## 2021-06-23 RX ORDER — METHYLPREDNISOLONE ACETATE 80 MG/ML
60 INJECTION, SUSPENSION INTRA-ARTICULAR; INTRALESIONAL; INTRAMUSCULAR; SOFT TISSUE ONCE
Status: COMPLETED | OUTPATIENT
Start: 2021-06-23 | End: 2021-06-23

## 2021-06-23 RX ORDER — HYDROCODONE BITARTRATE AND ACETAMINOPHEN 5; 325 MG/1; MG/1
1 TABLET ORAL 2 TIMES DAILY PRN
Qty: 60 TABLET | Refills: 0 | Status: SHIPPED | OUTPATIENT
Start: 2021-06-23 | End: 2021-07-30

## 2021-06-23 RX ORDER — HYDROCODONE BITARTRATE AND ACETAMINOPHEN 7.5; 325 MG/1; MG/1
1 TABLET ORAL 2 TIMES DAILY PRN
Qty: 60 TABLET | Refills: 0 | Status: CANCELLED | OUTPATIENT
Start: 2021-06-23 | End: 2021-07-23

## 2021-06-23 RX ORDER — ALBUTEROL SULFATE 90 UG/1
2 AEROSOL, METERED RESPIRATORY (INHALATION) EVERY 6 HOURS PRN
Qty: 54 G | Refills: 3 | Status: SHIPPED | OUTPATIENT
Start: 2021-06-23

## 2021-06-23 RX ORDER — CYANOCOBALAMIN 1000 UG/ML
1000 INJECTION INTRAMUSCULAR; SUBCUTANEOUS ONCE
Status: COMPLETED | OUTPATIENT
Start: 2021-06-23 | End: 2021-06-23

## 2021-06-23 RX ORDER — LANSOPRAZOLE 30 MG/1
30 CAPSULE, DELAYED RELEASE ORAL DAILY
Qty: 90 CAPSULE | Refills: 3 | Status: SHIPPED | OUTPATIENT
Start: 2021-06-23 | End: 2022-08-04

## 2021-06-23 RX ADMIN — CYANOCOBALAMIN 1000 MCG: 1000 INJECTION INTRAMUSCULAR; SUBCUTANEOUS at 11:35

## 2021-06-23 RX ADMIN — METHYLPREDNISOLONE ACETATE 60 MG: 80 INJECTION, SUSPENSION INTRA-ARTICULAR; INTRALESIONAL; INTRAMUSCULAR; SOFT TISSUE at 11:36

## 2021-06-23 SDOH — ECONOMIC STABILITY: FOOD INSECURITY: WITHIN THE PAST 12 MONTHS, YOU WORRIED THAT YOUR FOOD WOULD RUN OUT BEFORE YOU GOT MONEY TO BUY MORE.: NEVER TRUE

## 2021-06-23 SDOH — ECONOMIC STABILITY: FOOD INSECURITY: WITHIN THE PAST 12 MONTHS, THE FOOD YOU BOUGHT JUST DIDN'T LAST AND YOU DIDN'T HAVE MONEY TO GET MORE.: NEVER TRUE

## 2021-06-23 ASSESSMENT — SOCIAL DETERMINANTS OF HEALTH (SDOH): HOW HARD IS IT FOR YOU TO PAY FOR THE VERY BASICS LIKE FOOD, HOUSING, MEDICAL CARE, AND HEATING?: HARD

## 2021-06-23 ASSESSMENT — PATIENT HEALTH QUESTIONNAIRE - PHQ9
SUM OF ALL RESPONSES TO PHQ QUESTIONS 1-9: 0
1. LITTLE INTEREST OR PLEASURE IN DOING THINGS: 0
SUM OF ALL RESPONSES TO PHQ QUESTIONS 1-9: 0
2. FEELING DOWN, DEPRESSED OR HOPELESS: 0
SUM OF ALL RESPONSES TO PHQ9 QUESTIONS 1 & 2: 0
SUM OF ALL RESPONSES TO PHQ QUESTIONS 1-9: 0
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
## 2021-06-24 ENCOUNTER — APPOINTMENT (OUTPATIENT)
Dept: MAMMOGRAPHY | Facility: HOSPITAL | Age: 71
End: 2021-06-24

## 2021-06-24 ASSESSMENT — ENCOUNTER SYMPTOMS
SINUS PRESSURE: 0
ABDOMINAL DISTENTION: 0
BACK PAIN: 0
SHORTNESS OF BREATH: 1
COUGH: 1
EYE REDNESS: 0
ABDOMINAL PAIN: 0
EYE DISCHARGE: 0

## 2021-06-25 ENCOUNTER — APPOINTMENT (OUTPATIENT)
Dept: MAMMOGRAPHY | Facility: HOSPITAL | Age: 71
End: 2021-06-25

## 2021-07-26 DIAGNOSIS — M47.22 OSTEOARTHRITIS OF SPINE WITH RADICULOPATHY, CERVICAL REGION: ICD-10-CM

## 2021-07-30 RX ORDER — HYDROCODONE BITARTRATE AND ACETAMINOPHEN 5; 325 MG/1; MG/1
TABLET ORAL
Qty: 60 TABLET | Refills: 0 | Status: SHIPPED | OUTPATIENT
Start: 2021-07-30 | End: 2021-11-18

## 2021-10-05 ENCOUNTER — TELEPHONE (OUTPATIENT)
Dept: INTERNAL MEDICINE | Age: 71
End: 2021-10-05

## 2021-10-05 RX ORDER — TIZANIDINE 4 MG/1
4 TABLET ORAL 2 TIMES DAILY PRN
Qty: 30 TABLET | Refills: 0 | Status: SHIPPED | OUTPATIENT
Start: 2021-10-05 | End: 2021-10-27

## 2021-10-05 NOTE — TELEPHONE ENCOUNTER
Try tylenol arthrtis strenght for awhile- that is good she got off lortab---I also worte a muscle relaxer as might help her headache since her headaches seem to come from neck tension

## 2021-10-05 NOTE — TELEPHONE ENCOUNTER
She has stopped using the Lortab, but regular tylenol does not help when she gets a bad headache.   She used to take Tylenol 3 a long time ago and is asking if she can take those again prn?

## 2021-10-26 ENCOUNTER — TELEPHONE (OUTPATIENT)
Dept: INTERNAL MEDICINE | Age: 71
End: 2021-10-26

## 2021-10-26 NOTE — TELEPHONE ENCOUNTER
----- Message from Richard Mckeon sent at 10/26/2021 10:02 AM CDT -----  Subject: Appointment Request    Reason for Call: Routine Existing Condition Follow Up    QUESTIONS  Type of Appointment? Established Patient  Reason for appointment request? No appointments available during search  Additional Information for Provider? Patient need to reschedule   appointment for next week if possible due to migraine headache. Also   patient would like to know if Dr Tha Quintanilla can call her something in for   migraine headache - she took the tiZANidine (Jules Rodes) 4 MG tablet but he   made her pass out and want something else.   ---------------------------------------------------------------------------  --------------  [facility] [address]  What is the best way for the office to contact you? OK to leave message on   voicemail  Preferred Call Back Phone Number? [phone]  ---------------------------------------------------------------------------  --------------  SCRIPT ANSWERS  Relationship to Patient? Self  Have your symptoms changed? No  (Is the patient requesting to be seen urgently for their symptoms?)? No  Is this follow up request related to routine Diabetes Management? No  Are you having any new concerns about your existing condition? No  Have you been diagnosed with, awaiting test results for, or told that you   are suspected of having COVID-19 (Coronavirus)? (If patient has tested   negative or was tested as a requirement for work, school, or travel and   not based on symptoms, answer no)? No  Within the past two weeks have you developed any of the following symptoms   (answer no if symptoms have been present longer than 2 weeks or began   more than 2 weeks ago)?  Fever or Chills, Cough, Shortness of breath or   difficulty breathing, Loss of taste or smell, Sore throat, Nasal   congestion, Sneezing or runny nose, Fatigue or generalized body aches   (answer no if pain is specific to a body part e.g. back pain), Diarrhea, Headache? No  Have you had close contact with someone with COVID-19 in the last 14 days? No  (Service Expert  click yes below to proceed with [employer] As Usual   Scheduling)?  Yes

## 2021-10-27 RX ORDER — IBUPROFEN 600 MG/1
600 TABLET ORAL 2 TIMES DAILY PRN
Qty: 60 TABLET | Refills: 0 | Status: SHIPPED | OUTPATIENT
Start: 2021-10-27 | End: 2022-03-24 | Stop reason: SDUPTHER

## 2021-10-27 NOTE — TELEPHONE ENCOUNTER
She is using excedrin migraine and is feeling better today. She is asking if maybe we could send in Ibuprofen 600mg?

## 2021-10-27 NOTE — TELEPHONE ENCOUNTER
I can send in a few but dont take with Excedrin Migraine because of the aspirin in the ibuprofen together would increase her risk of a stomach ulcer.   Make sure she stays on her Dexilant a viscid both are listed we need to find out which when she is actually on   she had a stomach ulcer in the past she can use ibuprofen as needed but try not to take it every day and we see her again in November can see how she is doing

## 2021-11-18 ENCOUNTER — OFFICE VISIT (OUTPATIENT)
Dept: INTERNAL MEDICINE | Age: 71
End: 2021-11-18
Payer: MEDICARE

## 2021-11-18 VITALS
HEIGHT: 60 IN | BODY MASS INDEX: 17.28 KG/M2 | HEART RATE: 82 BPM | OXYGEN SATURATION: 97 % | DIASTOLIC BLOOD PRESSURE: 74 MMHG | SYSTOLIC BLOOD PRESSURE: 136 MMHG | WEIGHT: 88 LBS

## 2021-11-18 DIAGNOSIS — E03.9 ACQUIRED HYPOTHYROIDISM: ICD-10-CM

## 2021-11-18 DIAGNOSIS — R73.09 OTHER ABNORMAL GLUCOSE: ICD-10-CM

## 2021-11-18 DIAGNOSIS — Z91.81 AT HIGH RISK FOR FALLS: ICD-10-CM

## 2021-11-18 DIAGNOSIS — D51.8 OTHER VITAMIN B12 DEFICIENCY ANEMIA: ICD-10-CM

## 2021-11-18 DIAGNOSIS — Z23 NEED FOR INFLUENZA VACCINATION: ICD-10-CM

## 2021-11-18 DIAGNOSIS — E55.9 VITAMIN D DEFICIENCY: ICD-10-CM

## 2021-11-18 DIAGNOSIS — R55 SYNCOPE, UNSPECIFIED SYNCOPE TYPE: ICD-10-CM

## 2021-11-18 DIAGNOSIS — J41.1 MUCOPURULENT CHRONIC BRONCHITIS (HCC): Primary | ICD-10-CM

## 2021-11-18 DIAGNOSIS — Z87.891 PERSONAL HISTORY OF TOBACCO USE: ICD-10-CM

## 2021-11-18 DIAGNOSIS — M47.22 OSTEOARTHRITIS OF SPINE WITH RADICULOPATHY, CERVICAL REGION: ICD-10-CM

## 2021-11-18 LAB
ALBUMIN SERPL-MCNC: 4.9 G/DL (ref 3.5–5.2)
ALP BLD-CCNC: 83 U/L (ref 35–104)
ALT SERPL-CCNC: 12 U/L (ref 5–33)
ANION GAP SERPL CALCULATED.3IONS-SCNC: 13 MMOL/L (ref 7–19)
AST SERPL-CCNC: 26 U/L (ref 5–32)
BILIRUB SERPL-MCNC: 0.4 MG/DL (ref 0.2–1.2)
BUN BLDV-MCNC: 9 MG/DL (ref 8–23)
CALCIUM SERPL-MCNC: 9.8 MG/DL (ref 8.8–10.2)
CHLORIDE BLD-SCNC: 95 MMOL/L (ref 98–111)
CO2: 26 MMOL/L (ref 22–29)
CREAT SERPL-MCNC: 0.8 MG/DL (ref 0.5–0.9)
FOLATE: 10.8 NG/ML (ref 4.8–37.3)
GFR AFRICAN AMERICAN: >59
GFR NON-AFRICAN AMERICAN: >60
GLUCOSE BLD-MCNC: 105 MG/DL (ref 74–109)
HBA1C MFR BLD: 5.6 % (ref 4–6)
HCT VFR BLD CALC: 34 % (ref 37–47)
HEMOGLOBIN: 10.8 G/DL (ref 12–16)
MCH RBC QN AUTO: 28.5 PG (ref 27–31)
MCHC RBC AUTO-ENTMCNC: 31.8 G/DL (ref 33–37)
MCV RBC AUTO: 89.7 FL (ref 81–99)
PDW BLD-RTO: 13.5 % (ref 11.5–14.5)
PLATELET # BLD: 393 K/UL (ref 130–400)
PMV BLD AUTO: 9.6 FL (ref 9.4–12.3)
POTASSIUM SERPL-SCNC: 4.9 MMOL/L (ref 3.5–5)
RBC # BLD: 3.79 M/UL (ref 4.2–5.4)
SODIUM BLD-SCNC: 134 MMOL/L (ref 136–145)
TOTAL PROTEIN: 7.7 G/DL (ref 6.6–8.7)
TSH SERPL DL<=0.05 MIU/L-ACNC: 1.55 UIU/ML (ref 0.27–4.2)
VITAMIN B-12: >2000 PG/ML (ref 211–946)
VITAMIN D 25-HYDROXY: >100 NG/ML
WBC # BLD: 6.8 K/UL (ref 4.8–10.8)

## 2021-11-18 PROCEDURE — 96372 THER/PROPH/DIAG INJ SC/IM: CPT | Performed by: INTERNAL MEDICINE

## 2021-11-18 PROCEDURE — 99214 OFFICE O/P EST MOD 30 MIN: CPT | Performed by: INTERNAL MEDICINE

## 2021-11-18 PROCEDURE — G0296 VISIT TO DETERM LDCT ELIG: HCPCS | Performed by: INTERNAL MEDICINE

## 2021-11-18 PROCEDURE — 90694 VACC AIIV4 NO PRSRV 0.5ML IM: CPT | Performed by: INTERNAL MEDICINE

## 2021-11-18 PROCEDURE — G0008 ADMIN INFLUENZA VIRUS VAC: HCPCS | Performed by: INTERNAL MEDICINE

## 2021-11-18 RX ORDER — CYANOCOBALAMIN 1000 UG/ML
1000 INJECTION INTRAMUSCULAR; SUBCUTANEOUS ONCE
Status: COMPLETED | OUTPATIENT
Start: 2021-11-18 | End: 2021-11-18

## 2021-11-18 RX ADMIN — CYANOCOBALAMIN 1000 MCG: 1000 INJECTION INTRAMUSCULAR; SUBCUTANEOUS at 15:42
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
## 2021-12-03 RX ORDER — ATORVASTATIN CALCIUM 40 MG/1
40 TABLET, FILM COATED ORAL DAILY
Qty: 90 TABLET | Refills: 3 | Status: SHIPPED | OUTPATIENT
Start: 2021-12-03 | End: 2022-09-15

## 2021-12-20 ENCOUNTER — HOSPITAL ENCOUNTER (OUTPATIENT)
Dept: CT IMAGING | Age: 71
Discharge: HOME | End: 2021-12-20
Payer: MEDICARE

## 2021-12-20 DIAGNOSIS — Z87.891 PERSONAL HISTORY OF TOBACCO USE: ICD-10-CM

## 2021-12-20 PROCEDURE — 71271 CT THORAX LUNG CANCER SCR C-: CPT

## 2021-12-22 ENCOUNTER — TELEPHONE (OUTPATIENT)
Dept: INTERNAL MEDICINE | Age: 71
End: 2021-12-22

## 2022-01-03 DIAGNOSIS — F34.1 DYSTHYMIA: ICD-10-CM

## 2022-01-03 RX ORDER — ERGOCALCIFEROL 1.25 MG/1
CAPSULE ORAL
Qty: 12 CAPSULE | Refills: 3 | Status: SHIPPED | OUTPATIENT
Start: 2022-01-03 | End: 2022-05-17

## 2022-01-03 RX ORDER — SERTRALINE HYDROCHLORIDE 25 MG/1
TABLET, FILM COATED ORAL
Qty: 90 TABLET | Refills: 3 | Status: SHIPPED | OUTPATIENT
Start: 2022-01-03

## 2022-01-03 NOTE — TELEPHONE ENCOUNTER
Dietrich Bamberger called requesting a refill of the below medication which has been pended for you:     Requested Prescriptions     Pending Prescriptions Disp Refills    vitamin D (ERGOCALCIFEROL) 1.25 MG (07969 UT) CAPS capsule [Pharmacy Med Name: VITAMIN D [id number] CAPSULE] 12 capsule 3     Sig: Take ONE (1) capsule by mouth once A week    sertraline (ZOLOFT) 25 MG tablet [Pharmacy Med Name: SERTRALINE HCL 25MG TABLET] 90 tablet 3     Sig: TAKE ONE TABLET BY MOUTH EVERY DAY       Last Appointment Date: 11/18/2021  Next Appointment Date: 2/14/2022    Allergies   Allergen Reactions    Zanaflex [Tizanidine] Other (See Comments)     syncope    Dye [Iodides]      Throat tightness

## 2022-02-15 ENCOUNTER — OFFICE VISIT (OUTPATIENT)
Dept: INTERNAL MEDICINE | Age: 72
End: 2022-02-15
Payer: MEDICARE

## 2022-02-15 VITALS
BODY MASS INDEX: 17.47 KG/M2 | HEIGHT: 60 IN | SYSTOLIC BLOOD PRESSURE: 130 MMHG | DIASTOLIC BLOOD PRESSURE: 74 MMHG | HEART RATE: 82 BPM | WEIGHT: 89 LBS | OXYGEN SATURATION: 96 %

## 2022-02-15 DIAGNOSIS — D51.8 OTHER VITAMIN B12 DEFICIENCY ANEMIA: ICD-10-CM

## 2022-02-15 DIAGNOSIS — G44.229 CHRONIC TENSION-TYPE HEADACHE, NOT INTRACTABLE: Primary | ICD-10-CM

## 2022-02-15 DIAGNOSIS — J30.89 NON-SEASONAL ALLERGIC RHINITIS, UNSPECIFIED TRIGGER: ICD-10-CM

## 2022-02-15 DIAGNOSIS — R73.09 OTHER ABNORMAL GLUCOSE: ICD-10-CM

## 2022-02-15 DIAGNOSIS — E78.00 PURE HYPERCHOLESTEROLEMIA: ICD-10-CM

## 2022-02-15 DIAGNOSIS — M47.22 OSTEOARTHRITIS OF SPINE WITH RADICULOPATHY, CERVICAL REGION: ICD-10-CM

## 2022-02-15 DIAGNOSIS — E03.9 ACQUIRED HYPOTHYROIDISM: ICD-10-CM

## 2022-02-15 DIAGNOSIS — J41.1 MUCOPURULENT CHRONIC BRONCHITIS (HCC): ICD-10-CM

## 2022-02-15 PROCEDURE — 96372 THER/PROPH/DIAG INJ SC/IM: CPT | Performed by: INTERNAL MEDICINE

## 2022-02-15 PROCEDURE — 99214 OFFICE O/P EST MOD 30 MIN: CPT | Performed by: INTERNAL MEDICINE

## 2022-02-15 RX ORDER — CYANOCOBALAMIN 1000 UG/ML
1000 INJECTION INTRAMUSCULAR; SUBCUTANEOUS ONCE
Status: COMPLETED | OUTPATIENT
Start: 2022-02-15 | End: 2022-02-15

## 2022-02-15 RX ORDER — SUMATRIPTAN 50 MG/1
TABLET, FILM COATED ORAL
Qty: 18 TABLET | Refills: 1 | Status: SHIPPED | OUTPATIENT
Start: 2022-02-15

## 2022-02-15 RX ORDER — AZELASTINE 1 MG/ML
2 SPRAY, METERED NASAL 2 TIMES DAILY
Qty: 120 ML | Refills: 1 | Status: SHIPPED | OUTPATIENT
Start: 2022-02-15

## 2022-02-15 RX ORDER — PREDNISONE 20 MG/1
20 TABLET ORAL DAILY
Qty: 5 TABLET | Refills: 0 | Status: SHIPPED | OUTPATIENT
Start: 2022-02-15 | End: 2022-02-20

## 2022-02-15 RX ORDER — METHYLPREDNISOLONE ACETATE 40 MG/ML
40 INJECTION, SUSPENSION INTRA-ARTICULAR; INTRALESIONAL; INTRAMUSCULAR; SOFT TISSUE ONCE
Status: DISCONTINUED | OUTPATIENT
Start: 2022-02-15 | End: 2022-05-30

## 2022-02-15 RX ADMIN — CYANOCOBALAMIN 1000 MCG: 1000 INJECTION INTRAMUSCULAR; SUBCUTANEOUS at 14:33
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
## 2022-03-01 DIAGNOSIS — E03.9 ACQUIRED HYPOTHYROIDISM: ICD-10-CM

## 2022-03-01 DIAGNOSIS — J30.89 NON-SEASONAL ALLERGIC RHINITIS: ICD-10-CM

## 2022-03-02 RX ORDER — LEVOCETIRIZINE DIHYDROCHLORIDE 5 MG/1
TABLET, FILM COATED ORAL
Qty: 90 TABLET | Refills: 3 | Status: SHIPPED | OUTPATIENT
Start: 2022-03-02

## 2022-03-02 RX ORDER — LEVOTHYROXINE SODIUM 0.03 MG/1
TABLET ORAL
Qty: 90 TABLET | Refills: 3 | Status: SHIPPED | OUTPATIENT
Start: 2022-03-02

## 2022-03-24 ENCOUNTER — TELEPHONE (OUTPATIENT)
Dept: INTERNAL MEDICINE | Age: 72
End: 2022-03-24

## 2022-03-24 RX ORDER — IBUPROFEN 600 MG/1
600 TABLET ORAL 2 TIMES DAILY PRN
Qty: 60 TABLET | Refills: 0 | Status: SHIPPED | OUTPATIENT
Start: 2022-03-24 | End: 2022-08-04

## 2022-03-24 NOTE — TELEPHONE ENCOUNTER
Please tell her to be very cautious with it make sure she takes it with food and that it does not hurt her stomach

## 2022-03-24 NOTE — TELEPHONE ENCOUNTER
----- Message from [facility] sent at 3/24/2022  3:18 PM CDT -----  Subject: Medication Problem    QUESTIONS  Name of Medication? ibuprofen (ADVIL;MOTRIN) 600 MG tablet  Patient-reported dosage and instructions? 600 MG 1 tablet a day   What question or problem do you have with the medication? Patient called   in to see if her PCP can give her a call if she can send her new   prescription over to the pharmacy. Preferred Pharmacy?   [address], [facility] [address]  Pharmacy phone number (if available)? [phone]  Additional Information for Provider?   ---------------------------------------------------------------------------  --------------  [address]  What is the best way for the office to contact you? Do not leave any   message, patient will call back for answer  Preferred Call Back Phone Number? [phone]  ---------------------------------------------------------------------------  --------------  SCRIPT ANSWERS  Relationship to Patient?  Self

## 2022-03-25 RX ORDER — PREDNISONE 20 MG/1
TABLET ORAL
Qty: 5 TABLET | Refills: 0 | OUTPATIENT
Start: 2022-03-25

## 2022-03-25 RX ORDER — IBUPROFEN 600 MG/1
TABLET ORAL
Qty: 60 TABLET | Refills: 0 | OUTPATIENT
Start: 2022-03-25

## 2022-05-17 ENCOUNTER — OFFICE VISIT (OUTPATIENT)
Dept: INTERNAL MEDICINE | Age: 72
End: 2022-05-17
Payer: MEDICARE

## 2022-05-17 VITALS
HEIGHT: 62 IN | WEIGHT: 93 LBS | OXYGEN SATURATION: 98 % | HEART RATE: 90 BPM | SYSTOLIC BLOOD PRESSURE: 130 MMHG | BODY MASS INDEX: 17.11 KG/M2 | DIASTOLIC BLOOD PRESSURE: 80 MMHG

## 2022-05-17 DIAGNOSIS — J01.01 ACUTE RECURRENT MAXILLARY SINUSITIS: ICD-10-CM

## 2022-05-17 DIAGNOSIS — E78.00 PURE HYPERCHOLESTEROLEMIA: ICD-10-CM

## 2022-05-17 DIAGNOSIS — E55.9 VITAMIN D DEFICIENCY: ICD-10-CM

## 2022-05-17 DIAGNOSIS — J41.1 MUCOPURULENT CHRONIC BRONCHITIS (HCC): Primary | ICD-10-CM

## 2022-05-17 DIAGNOSIS — G44.229 CHRONIC TENSION-TYPE HEADACHE, NOT INTRACTABLE: ICD-10-CM

## 2022-05-17 DIAGNOSIS — M47.22 OSTEOARTHRITIS OF SPINE WITH RADICULOPATHY, CERVICAL REGION: ICD-10-CM

## 2022-05-17 DIAGNOSIS — D51.8 OTHER VITAMIN B12 DEFICIENCY ANEMIA: ICD-10-CM

## 2022-05-17 DIAGNOSIS — R73.09 OTHER ABNORMAL GLUCOSE: ICD-10-CM

## 2022-05-17 PROCEDURE — 1123F ACP DISCUSS/DSCN MKR DOCD: CPT | Performed by: INTERNAL MEDICINE

## 2022-05-17 PROCEDURE — 96372 THER/PROPH/DIAG INJ SC/IM: CPT | Performed by: INTERNAL MEDICINE

## 2022-05-17 PROCEDURE — 99214 OFFICE O/P EST MOD 30 MIN: CPT | Performed by: INTERNAL MEDICINE

## 2022-05-17 RX ORDER — DEXAMETHASONE SODIUM PHOSPHATE 4 MG/ML
4 INJECTION, SOLUTION INTRA-ARTICULAR; INTRALESIONAL; INTRAMUSCULAR; INTRAVENOUS; SOFT TISSUE ONCE
Status: COMPLETED | OUTPATIENT
Start: 2022-05-17 | End: 2022-05-17

## 2022-05-17 RX ORDER — CYANOCOBALAMIN 1000 UG/ML
1000 INJECTION INTRAMUSCULAR; SUBCUTANEOUS ONCE
Status: COMPLETED | OUTPATIENT
Start: 2022-05-17 | End: 2022-05-17

## 2022-05-17 RX ORDER — CEFDINIR 300 MG/1
300 CAPSULE ORAL 2 TIMES DAILY
Qty: 20 CAPSULE | Refills: 0 | Status: SHIPPED | OUTPATIENT
Start: 2022-05-17 | End: 2022-05-27

## 2022-05-17 RX ADMIN — CYANOCOBALAMIN 1000 MCG: 1000 INJECTION INTRAMUSCULAR; SUBCUTANEOUS at 14:38

## 2022-05-17 RX ADMIN — DEXAMETHASONE SODIUM PHOSPHATE 4 MG: 4 INJECTION, SOLUTION INTRA-ARTICULAR; INTRALESIONAL; INTRAMUSCULAR; INTRAVENOUS; SOFT TISSUE at 14:38
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
## 2022-06-22 DIAGNOSIS — D51.8 OTHER VITAMIN B12 DEFICIENCY ANEMIA: ICD-10-CM

## 2022-06-22 LAB
FERRITIN: 51.4 NG/ML (ref 13–150)
FOLATE: 11.3 NG/ML (ref 4.8–37.3)
IRON: 78 UG/DL (ref 37–145)
VITAMIN B-12: 654 PG/ML (ref 211–946)

## 2022-08-04 DIAGNOSIS — K21.00 GASTROESOPHAGEAL REFLUX DISEASE WITH ESOPHAGITIS WITHOUT HEMORRHAGE: ICD-10-CM

## 2022-08-04 RX ORDER — IBUPROFEN 600 MG/1
TABLET ORAL
Qty: 60 TABLET | Refills: 0 | Status: SHIPPED | OUTPATIENT
Start: 2022-08-04

## 2022-08-04 RX ORDER — LANSOPRAZOLE 30 MG/1
CAPSULE, DELAYED RELEASE ORAL
Qty: 90 CAPSULE | Refills: 3 | Status: SHIPPED | OUTPATIENT
Start: 2022-08-04

## 2022-09-15 ENCOUNTER — TELEPHONE (OUTPATIENT)
Dept: INTERNAL MEDICINE | Age: 72
End: 2022-09-15

## 2022-09-15 RX ORDER — ATORVASTATIN CALCIUM 40 MG/1
TABLET, FILM COATED ORAL
Qty: 90 TABLET | Refills: 3 | Status: SHIPPED | OUTPATIENT
Start: 2022-09-15

## 2022-09-15 RX ORDER — METHYLPREDNISOLONE 4 MG/1
TABLET ORAL
Qty: 1 KIT | Refills: 0 | Status: SHIPPED | OUTPATIENT
Start: 2022-09-15 | End: 2022-09-21

## 2022-09-15 NOTE — TELEPHONE ENCOUNTER
She has copd and has exacerbations -- I sent in steroid pack but see if she can get a home covid test if it sounds like that or if just cough dont have to---- -if fever chills etc let me know

## 2022-09-15 NOTE — TELEPHONE ENCOUNTER
Caty Rivera called to request a refill on her medication.       Last office visit : 5/17/2022   Next office visit : 11/11/2022     Requested Prescriptions     Pending Prescriptions Disp Refills    atorvastatin (LIPITOR) 40 MG tablet [Pharmacy Med Name: ATORVASTATIN CALCIUM 40MG TABLET] 90 tablet 3     Sig: Take ONE (1) tablet by mouth daily            Karuna Lara MA

## 2022-11-11 ENCOUNTER — OFFICE VISIT (OUTPATIENT)
Dept: INTERNAL MEDICINE | Age: 72
End: 2022-11-11
Payer: MEDICARE

## 2022-11-11 VITALS
OXYGEN SATURATION: 96 % | DIASTOLIC BLOOD PRESSURE: 62 MMHG | WEIGHT: 89 LBS | HEART RATE: 88 BPM | HEIGHT: 62 IN | SYSTOLIC BLOOD PRESSURE: 120 MMHG | BODY MASS INDEX: 16.38 KG/M2

## 2022-11-11 DIAGNOSIS — E55.9 VITAMIN D DEFICIENCY: ICD-10-CM

## 2022-11-11 DIAGNOSIS — D50.9 IRON DEFICIENCY ANEMIA, UNSPECIFIED IRON DEFICIENCY ANEMIA TYPE: ICD-10-CM

## 2022-11-11 DIAGNOSIS — J41.1 MUCOPURULENT CHRONIC BRONCHITIS (HCC): Primary | ICD-10-CM

## 2022-11-11 DIAGNOSIS — F34.1 DYSTHYMIA: ICD-10-CM

## 2022-11-11 DIAGNOSIS — J47.1 BRONCHIECTASIS WITH ACUTE EXACERBATION (HCC): ICD-10-CM

## 2022-11-11 DIAGNOSIS — D51.8 OTHER VITAMIN B12 DEFICIENCY ANEMIA: ICD-10-CM

## 2022-11-11 DIAGNOSIS — E78.00 PURE HYPERCHOLESTEROLEMIA: ICD-10-CM

## 2022-11-11 DIAGNOSIS — Z23 INFLUENZA VACCINE NEEDED: ICD-10-CM

## 2022-11-11 DIAGNOSIS — Z87.891 PERSONAL HISTORY OF TOBACCO USE: ICD-10-CM

## 2022-11-11 LAB
ALBUMIN SERPL-MCNC: 4.5 G/DL (ref 3.5–5.2)
ALP BLD-CCNC: 72 U/L (ref 35–104)
ALT SERPL-CCNC: 8 U/L (ref 5–33)
ANION GAP SERPL CALCULATED.3IONS-SCNC: 9 MMOL/L (ref 7–19)
AST SERPL-CCNC: 23 U/L (ref 5–32)
BILIRUB SERPL-MCNC: 0.5 MG/DL (ref 0.2–1.2)
BUN BLDV-MCNC: 6 MG/DL (ref 8–23)
CALCIUM SERPL-MCNC: 9.3 MG/DL (ref 8.8–10.2)
CHLORIDE BLD-SCNC: 100 MMOL/L (ref 98–111)
CHOLESTEROL, TOTAL: 153 MG/DL (ref 160–199)
CO2: 29 MMOL/L (ref 22–29)
CREAT SERPL-MCNC: 0.7 MG/DL (ref 0.5–0.9)
FERRITIN: 64 NG/ML (ref 13–150)
GFR SERPL CREATININE-BSD FRML MDRD: >60 ML/MIN/{1.73_M2}
GLUCOSE BLD-MCNC: 101 MG/DL (ref 74–109)
HCT VFR BLD CALC: 34 % (ref 37–47)
HDLC SERPL-MCNC: 72 MG/DL (ref 65–121)
HEMOGLOBIN: 10.6 G/DL (ref 12–16)
IRON: 67 UG/DL (ref 37–145)
LDL CHOLESTEROL CALCULATED: 64 MG/DL
MCH RBC QN AUTO: 27.5 PG (ref 27–31)
MCHC RBC AUTO-ENTMCNC: 31.2 G/DL (ref 33–37)
MCV RBC AUTO: 88.1 FL (ref 81–99)
PDW BLD-RTO: 14.2 % (ref 11.5–14.5)
PLATELET # BLD: 345 K/UL (ref 130–400)
PMV BLD AUTO: 10.4 FL (ref 9.4–12.3)
POTASSIUM SERPL-SCNC: 4.2 MMOL/L (ref 3.5–5)
RBC # BLD: 3.86 M/UL (ref 4.2–5.4)
SODIUM BLD-SCNC: 138 MMOL/L (ref 136–145)
TOTAL PROTEIN: 6.8 G/DL (ref 6.6–8.7)
TRIGL SERPL-MCNC: 83 MG/DL (ref 0–149)
TSH SERPL DL<=0.05 MIU/L-ACNC: 0.81 UIU/ML (ref 0.27–4.2)
VITAMIN D 25-HYDROXY: >100 NG/ML
WBC # BLD: 8.6 K/UL (ref 4.8–10.8)

## 2022-11-11 PROCEDURE — G0296 VISIT TO DETERM LDCT ELIG: HCPCS | Performed by: INTERNAL MEDICINE

## 2022-11-11 PROCEDURE — 99214 OFFICE O/P EST MOD 30 MIN: CPT | Performed by: INTERNAL MEDICINE

## 2022-11-11 PROCEDURE — G0008 ADMIN INFLUENZA VIRUS VAC: HCPCS | Performed by: INTERNAL MEDICINE

## 2022-11-11 PROCEDURE — 96372 THER/PROPH/DIAG INJ SC/IM: CPT | Performed by: INTERNAL MEDICINE

## 2022-11-11 PROCEDURE — 90694 VACC AIIV4 NO PRSRV 0.5ML IM: CPT | Performed by: INTERNAL MEDICINE

## 2022-11-11 PROCEDURE — 1123F ACP DISCUSS/DSCN MKR DOCD: CPT | Performed by: INTERNAL MEDICINE

## 2022-11-11 RX ORDER — METHYLPREDNISOLONE ACETATE 40 MG/ML
40 INJECTION, SUSPENSION INTRA-ARTICULAR; INTRALESIONAL; INTRAMUSCULAR; SOFT TISSUE ONCE
Status: COMPLETED | OUTPATIENT
Start: 2022-11-11 | End: 2022-11-11

## 2022-11-11 RX ORDER — DOXYCYCLINE HYCLATE 100 MG
100 TABLET ORAL 2 TIMES DAILY
Qty: 20 TABLET | Refills: 0 | Status: SHIPPED | OUTPATIENT
Start: 2022-11-11 | End: 2022-11-21

## 2022-11-11 RX ORDER — CYANOCOBALAMIN 1000 UG/ML
1000 INJECTION, SOLUTION INTRAMUSCULAR; SUBCUTANEOUS ONCE
Status: COMPLETED | OUTPATIENT
Start: 2022-11-11 | End: 2022-11-11

## 2022-11-11 RX ADMIN — CYANOCOBALAMIN 1000 MCG: 1000 INJECTION, SOLUTION INTRAMUSCULAR; SUBCUTANEOUS at 13:41

## 2022-11-11 RX ADMIN — METHYLPREDNISOLONE ACETATE 40 MG: 40 INJECTION, SUSPENSION INTRA-ARTICULAR; INTRALESIONAL; INTRAMUSCULAR; SOFT TISSUE at 13:48

## 2022-11-11 SDOH — ECONOMIC STABILITY: FOOD INSECURITY: WITHIN THE PAST 12 MONTHS, THE FOOD YOU BOUGHT JUST DIDN'T LAST AND YOU DIDN'T HAVE MONEY TO GET MORE.: NEVER TRUE

## 2022-11-11 SDOH — ECONOMIC STABILITY: FOOD INSECURITY: WITHIN THE PAST 12 MONTHS, YOU WORRIED THAT YOUR FOOD WOULD RUN OUT BEFORE YOU GOT MONEY TO BUY MORE.: SOMETIMES TRUE

## 2022-11-11 ASSESSMENT — PATIENT HEALTH QUESTIONNAIRE - PHQ9
SUM OF ALL RESPONSES TO PHQ9 QUESTIONS 1 & 2: 1
9. THOUGHTS THAT YOU WOULD BE BETTER OFF DEAD, OR OF HURTING YOURSELF: 0
7. TROUBLE CONCENTRATING ON THINGS, SUCH AS READING THE NEWSPAPER OR WATCHING TELEVISION: 0
8. MOVING OR SPEAKING SO SLOWLY THAT OTHER PEOPLE COULD HAVE NOTICED. OR THE OPPOSITE, BEING SO FIGETY OR RESTLESS THAT YOU HAVE BEEN MOVING AROUND A LOT MORE THAN USUAL: 0
SUM OF ALL RESPONSES TO PHQ QUESTIONS 1-9: 5
10. IF YOU CHECKED OFF ANY PROBLEMS, HOW DIFFICULT HAVE THESE PROBLEMS MADE IT FOR YOU TO DO YOUR WORK, TAKE CARE OF THINGS AT HOME, OR GET ALONG WITH OTHER PEOPLE: 0
2. FEELING DOWN, DEPRESSED OR HOPELESS: 0
SUM OF ALL RESPONSES TO PHQ QUESTIONS 1-9: 5
6. FEELING BAD ABOUT YOURSELF - OR THAT YOU ARE A FAILURE OR HAVE LET YOURSELF OR YOUR FAMILY DOWN: 0
SUM OF ALL RESPONSES TO PHQ QUESTIONS 1-9: 5
4. FEELING TIRED OR HAVING LITTLE ENERGY: 2
3. TROUBLE FALLING OR STAYING ASLEEP: 1
5. POOR APPETITE OR OVEREATING: 1
SUM OF ALL RESPONSES TO PHQ QUESTIONS 1-9: 5
1. LITTLE INTEREST OR PLEASURE IN DOING THINGS: 1

## 2022-11-11 ASSESSMENT — SOCIAL DETERMINANTS OF HEALTH (SDOH): HOW HARD IS IT FOR YOU TO PAY FOR THE VERY BASICS LIKE FOOD, HOUSING, MEDICAL CARE, AND HEATING?: SOMEWHAT HARD
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
## 2022-11-11 NOTE — PATIENT INSTRUCTIONS

## 2022-12-07 ENCOUNTER — TELEPHONE (OUTPATIENT)
Dept: INTERNAL MEDICINE | Age: 72
End: 2022-12-07

## 2022-12-07 DIAGNOSIS — J06.9 UPPER RESPIRATORY TRACT INFECTION, UNSPECIFIED TYPE: Primary | ICD-10-CM

## 2022-12-07 DIAGNOSIS — J47.9 BRONCHIECTASIS WITHOUT COMPLICATION (HCC): ICD-10-CM

## 2022-12-07 RX ORDER — ALBUTEROL SULFATE 2.5 MG/3ML
2.5 SOLUTION RESPIRATORY (INHALATION) EVERY 6 HOURS PRN
Qty: 120 EACH | Refills: 3 | Status: SHIPPED | OUTPATIENT
Start: 2022-12-07

## 2022-12-07 RX ORDER — AZITHROMYCIN 250 MG/1
250 TABLET, FILM COATED ORAL SEE ADMIN INSTRUCTIONS
Qty: 6 TABLET | Refills: 0 | Status: SHIPPED | OUTPATIENT
Start: 2022-12-07 | End: 2022-12-12

## 2022-12-07 NOTE — TELEPHONE ENCOUNTER
She has had a worse cough for about a week. No fever or aches or drainage. She has tried several otc medications without any success.   [address] [facility]

## 2022-12-07 NOTE — TELEPHONE ENCOUNTER
See if she could either go to a walk in clinic tonight and get checked or see me tomorrow (we hav openings)--- ask if she has a nebulizer machine if not if we could send her a nebulizer machine with tubing and albuterol nebs 2.5mg/3 ml use q 6 hours prn   I have given her lots of steroid so need to avoid -- see if she has a home covid test and I sent in a zpack and albuterol nebs-- if needs nebulizer please send in for her

## 2022-12-19 RX ORDER — IBUPROFEN 600 MG/1
TABLET ORAL
Qty: 60 TABLET | Refills: 2 | Status: SHIPPED | OUTPATIENT
Start: 2022-12-19

## 2023-02-07 DIAGNOSIS — E03.9 ACQUIRED HYPOTHYROIDISM: ICD-10-CM

## 2023-02-07 DIAGNOSIS — D50.9 IRON DEFICIENCY ANEMIA, UNSPECIFIED IRON DEFICIENCY ANEMIA TYPE: Primary | ICD-10-CM

## 2023-02-07 DIAGNOSIS — E78.00 PURE HYPERCHOLESTEROLEMIA: ICD-10-CM

## 2023-02-07 DIAGNOSIS — E55.9 VITAMIN D DEFICIENCY: ICD-10-CM

## 2023-02-07 DIAGNOSIS — D51.8 OTHER VITAMIN B12 DEFICIENCY ANEMIA: ICD-10-CM

## 2023-02-24 ENCOUNTER — OFFICE VISIT (OUTPATIENT)
Dept: INTERNAL MEDICINE | Age: 73
End: 2023-02-24

## 2023-02-24 VITALS
SYSTOLIC BLOOD PRESSURE: 160 MMHG | DIASTOLIC BLOOD PRESSURE: 72 MMHG | BODY MASS INDEX: 15.09 KG/M2 | HEIGHT: 62 IN | WEIGHT: 82 LBS | HEART RATE: 86 BPM | OXYGEN SATURATION: 95 %

## 2023-02-24 DIAGNOSIS — Z00.00 MEDICARE ANNUAL WELLNESS VISIT, SUBSEQUENT: Primary | ICD-10-CM

## 2023-02-24 DIAGNOSIS — E78.00 PURE HYPERCHOLESTEROLEMIA: ICD-10-CM

## 2023-02-24 DIAGNOSIS — D51.8 OTHER VITAMIN B12 DEFICIENCY ANEMIA: ICD-10-CM

## 2023-02-24 DIAGNOSIS — R73.09 OTHER ABNORMAL GLUCOSE: ICD-10-CM

## 2023-02-24 DIAGNOSIS — F34.1 DYSTHYMIA: ICD-10-CM

## 2023-02-24 DIAGNOSIS — J47.9 BRONCHIECTASIS WITHOUT COMPLICATION (HCC): ICD-10-CM

## 2023-02-24 DIAGNOSIS — E55.9 VITAMIN D DEFICIENCY: ICD-10-CM

## 2023-02-24 DIAGNOSIS — M47.22 OSTEOARTHRITIS OF SPINE WITH RADICULOPATHY, CERVICAL REGION: ICD-10-CM

## 2023-02-24 DIAGNOSIS — Z12.31 ENCOUNTER FOR SCREENING MAMMOGRAM FOR BREAST CANCER: ICD-10-CM

## 2023-02-24 DIAGNOSIS — E03.9 ACQUIRED HYPOTHYROIDISM: ICD-10-CM

## 2023-02-24 DIAGNOSIS — Z87.891 PERSONAL HISTORY OF TOBACCO USE: ICD-10-CM

## 2023-02-24 DIAGNOSIS — D50.9 IRON DEFICIENCY ANEMIA, UNSPECIFIED IRON DEFICIENCY ANEMIA TYPE: ICD-10-CM

## 2023-02-24 LAB
ALBUMIN SERPL-MCNC: 4.2 G/DL (ref 3.5–5.2)
ALP BLD-CCNC: 75 U/L (ref 35–104)
ALT SERPL-CCNC: 8 U/L (ref 5–33)
ANION GAP SERPL CALCULATED.3IONS-SCNC: 14 MMOL/L (ref 7–19)
AST SERPL-CCNC: 22 U/L (ref 5–32)
BILIRUB SERPL-MCNC: 0.5 MG/DL (ref 0.2–1.2)
BUN BLDV-MCNC: 6 MG/DL (ref 8–23)
CALCIUM SERPL-MCNC: 9.3 MG/DL (ref 8.8–10.2)
CHLORIDE BLD-SCNC: 103 MMOL/L (ref 98–111)
CHOLESTEROL, TOTAL: 153 MG/DL (ref 160–199)
CO2: 26 MMOL/L (ref 22–29)
CREAT SERPL-MCNC: 0.7 MG/DL (ref 0.5–0.9)
GFR SERPL CREATININE-BSD FRML MDRD: >60 ML/MIN/{1.73_M2}
GLUCOSE BLD-MCNC: 96 MG/DL (ref 74–109)
HBA1C MFR BLD: 5.4 % (ref 4–6)
HCT VFR BLD CALC: 33.5 % (ref 37–47)
HDLC SERPL-MCNC: 80 MG/DL (ref 65–121)
HEMOGLOBIN: 10.7 G/DL (ref 12–16)
LDL CHOLESTEROL CALCULATED: 58 MG/DL
MCH RBC QN AUTO: 27.9 PG (ref 27–31)
MCHC RBC AUTO-ENTMCNC: 31.9 G/DL (ref 33–37)
MCV RBC AUTO: 87.5 FL (ref 81–99)
PDW BLD-RTO: 13.9 % (ref 11.5–14.5)
PLATELET # BLD: 398 K/UL (ref 130–400)
PMV BLD AUTO: 11.6 FL (ref 9.4–12.3)
POTASSIUM SERPL-SCNC: 4.1 MMOL/L (ref 3.5–5)
RBC # BLD: 3.83 M/UL (ref 4.2–5.4)
SODIUM BLD-SCNC: 143 MMOL/L (ref 136–145)
TOTAL PROTEIN: 6.9 G/DL (ref 6.6–8.7)
TRIGL SERPL-MCNC: 74 MG/DL (ref 0–149)
TSH SERPL DL<=0.05 MIU/L-ACNC: 1.33 UIU/ML (ref 0.27–4.2)
VITAMIN B-12: >2000 PG/ML (ref 211–946)
VITAMIN D 25-HYDROXY: >120 NG/ML
WBC # BLD: 5.9 K/UL (ref 4.8–10.8)

## 2023-02-24 RX ORDER — CYANOCOBALAMIN 1000 UG/ML
1000 INJECTION, SOLUTION INTRAMUSCULAR; SUBCUTANEOUS ONCE
Status: COMPLETED | OUTPATIENT
Start: 2023-02-24 | End: 2023-02-24

## 2023-02-24 RX ORDER — METHYLPREDNISOLONE ACETATE 40 MG/ML
40 INJECTION, SUSPENSION INTRA-ARTICULAR; INTRALESIONAL; INTRAMUSCULAR; SOFT TISSUE ONCE
Status: COMPLETED | OUTPATIENT
Start: 2023-02-24 | End: 2023-02-24

## 2023-02-24 RX ADMIN — CYANOCOBALAMIN 1000 MCG: 1000 INJECTION, SOLUTION INTRAMUSCULAR; SUBCUTANEOUS at 15:05

## 2023-02-24 RX ADMIN — METHYLPREDNISOLONE ACETATE 40 MG: 40 INJECTION, SUSPENSION INTRA-ARTICULAR; INTRALESIONAL; INTRAMUSCULAR; SOFT TISSUE at 15:08

## 2023-02-24 SDOH — ECONOMIC STABILITY: FOOD INSECURITY: WITHIN THE PAST 12 MONTHS, YOU WORRIED THAT YOUR FOOD WOULD RUN OUT BEFORE YOU GOT MONEY TO BUY MORE.: NEVER TRUE

## 2023-02-24 SDOH — ECONOMIC STABILITY: HOUSING INSECURITY
IN THE LAST 12 MONTHS, WAS THERE A TIME WHEN YOU DID NOT HAVE A STEADY PLACE TO SLEEP OR SLEPT IN A SHELTER (INCLUDING NOW)?: NO

## 2023-02-24 SDOH — ECONOMIC STABILITY: INCOME INSECURITY: HOW HARD IS IT FOR YOU TO PAY FOR THE VERY BASICS LIKE FOOD, HOUSING, MEDICAL CARE, AND HEATING?: SOMEWHAT HARD

## 2023-02-24 SDOH — ECONOMIC STABILITY: FOOD INSECURITY: WITHIN THE PAST 12 MONTHS, THE FOOD YOU BOUGHT JUST DIDN'T LAST AND YOU DIDN'T HAVE MONEY TO GET MORE.: NEVER TRUE

## 2023-02-24 ASSESSMENT — PATIENT HEALTH QUESTIONNAIRE - PHQ9
SUM OF ALL RESPONSES TO PHQ QUESTIONS 1-9: 7
SUM OF ALL RESPONSES TO PHQ QUESTIONS 1-9: 7
SUM OF ALL RESPONSES TO PHQ9 QUESTIONS 1 & 2: 4
10. IF YOU CHECKED OFF ANY PROBLEMS, HOW DIFFICULT HAVE THESE PROBLEMS MADE IT FOR YOU TO DO YOUR WORK, TAKE CARE OF THINGS AT HOME, OR GET ALONG WITH OTHER PEOPLE: 1
2. FEELING DOWN, DEPRESSED OR HOPELESS: 1
7. TROUBLE CONCENTRATING ON THINGS, SUCH AS READING THE NEWSPAPER OR WATCHING TELEVISION: 0
8. MOVING OR SPEAKING SO SLOWLY THAT OTHER PEOPLE COULD HAVE NOTICED. OR THE OPPOSITE, BEING SO FIGETY OR RESTLESS THAT YOU HAVE BEEN MOVING AROUND A LOT MORE THAN USUAL: 0
SUM OF ALL RESPONSES TO PHQ QUESTIONS 1-9: 7
1. LITTLE INTEREST OR PLEASURE IN DOING THINGS: 3
6. FEELING BAD ABOUT YOURSELF - OR THAT YOU ARE A FAILURE OR HAVE LET YOURSELF OR YOUR FAMILY DOWN: 0
4. FEELING TIRED OR HAVING LITTLE ENERGY: 1
9. THOUGHTS THAT YOU WOULD BE BETTER OFF DEAD, OR OF HURTING YOURSELF: 0
5. POOR APPETITE OR OVEREATING: 1
3. TROUBLE FALLING OR STAYING ASLEEP: 1
SUM OF ALL RESPONSES TO PHQ QUESTIONS 1-9: 7

## 2023-02-24 ASSESSMENT — LIFESTYLE VARIABLES: HOW OFTEN DO YOU HAVE A DRINK CONTAINING ALCOHOL: NEVER
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
## 2023-02-24 NOTE — PATIENT INSTRUCTIONS
Learning About Mindfulness for Stress  What are mindfulness and stress? Stress is what you feel when you have to handle more than you are used to. A lot of things can cause stress. You may feel stress when you go on a job interview, take a test, or run a race. This kind of short-term stress is normal and even useful. It can help you if you need to work hard or react quickly. Stress also can last a long time. Long-term stress is caused by stressful situations or events. Examples of long-term stress include long-term health problems, ongoing problems at work, and conflicts in your family. Long-term stress can harm your health. Mindfulness is a focus only on things happening in the present moment. It's a process of purposefully paying attention to and being aware of your surroundings, your emotions, your thoughts, and how your body feels. You are aware of these things, but you aren't judging these experiences as \"good\" or \"bad. \" Mindfulness can help you learn to calm your mind and body to help you cope with illness, pain, and stress. How does mindfulness help to relieve stress? Mindfulness can help quiet your mind and relax your body. Studies show that it can help some people sleep better, feel less anxious, and bring their blood pressure down. And it's been shown to help some people live and cope better with certain health problems like heart disease, depression, chronic pain, and cancer. How do you practice mindfulness? To be mindful is to pay attention, to be present, and to be accepting. When you're mindful, you do just one thing and you pay close attention to that one thing. For example, you may sit quietly and notice your emotions or how your food tastes and smells. When you're present, you focus on the things that are happening right now. You let go of your thoughts about the past and the future. When you dwell on the past or the future, you miss moments that can heal and strengthen you.  You may miss moments like hearing a child laugh or seeing a friendly face when you think you're all alone. When you're accepting, you don't  the present moment. Instead you accept your thoughts and feelings as they come. You can practice anytime, anywhere, and in any way you choose. You can practice in many ways. Here are a few ideas:  While doing your chores, like washing the dishes, let your mind focus on what's in your hand. What does the dish feel like? Is the water warm or cold? Go outside and take a few deep breaths. What is the air like? Is it warm or cold? When you can, take some time at the start of your day to sit alone and think. Take a slow walk by yourself. Count your steps while you breathe in and out. Try yoga breathing exercises, stretches, and poses to strengthen and relax your muscles. At work, if you can, try to stop for a few moments each hour. Note how your body feels. Let yourself regroup and let your mind settle before you return to what you were doing. If you struggle with anxiety or \"worry thoughts,\" imagine your mind as a blue cornell and your worry thoughts as clouds. Now imagine those worry thoughts floating across your mind's cornell. Just let them pass by as you watch. Follow-up care is a key part of your treatment and safety. Be sure to make and go to all appointments, and call your doctor if you are having problems. It's also a good idea to know your test results and keep a list of the medicines you take. Where can you learn more? Go to http://www.castano.com/ and enter M676 to learn more about \"Learning About Mindfulness for Stress. \"  Current as of: February 9, 2022               Content Version: 13.5  © 2006-2022 Healthwise, Incorporated. Care instructions adapted under license by [facility] ([facility]).  If you have questions about a medical condition or this instruction, always ask your healthcare professional. [address] any warranty or liability for your use of this information.           Learning About Emotional Support  When do you need emotional support?     You might find getting support from others helpful when you have a long-term health problem. Often people feel alone, confused, or scared when coping with an illness. But you aren't alone. Other people are going through the same thing you are and know how you feel.  Talking with others about your feelings can help you feel better.  Your family and friends can give you support. So can your doctor, a support group, or a Yazdanism. If you have a support network, you will not feel as alone. You will learn new ways to deal with your situation, and you may try harder to overcome it.  Where you can get support  Family and friends: They can help you cope by giving you comfort and encouragement.  Counseling: Professional counseling can help you cope with situations that interfere with your life and cause stress. Counseling can help you understand and deal with your illness.  Your doctor: Find a doctor you trust and feel comfortable with. Be open and honest about your fears and concerns. Your doctor can help you get the right medical treatments, including counseling.  Spiritual or Caodaism groups: They can provide comfort and may be able to help you find counseling or other social support services.  Social groups: They can help you meet new people and get involved in activities you enjoy.  Community support groups: In a support group, you can talk to others who have dealt with the same problems or illness as you. You can encourage one another and learn ways to cope with tough emotions.  How to find a support group  Ask your doctor, counselor, or other health professional for suggestions.  Contact your local Yazdanism, Alevism, Restorationist, or other Caodaism group.  Ask your family and friends.  Ask people who have the same health concerns.  Go online. Forums and blogs let you read messages from others and leave your own  messages. You can exchange stories, vent your frustrations, and ask and answer questions. Contact a city, state, or national group that provides support for your health concerns. Your library or community center may have a list of these groups. Or you can look for information online. Look for a support group that works for you. Ask yourself if you prefer structure and would like a , or if you would like a less formal group. Do you prefer face-to-face meetings? Or do you feel more secure in online chat rooms or forums? Supportive relationships  A supportive relationship includes emotional support such as love, trust, and understanding, as well as advice and concrete help, such as help managing your time. Reach out to others  Family and friends can help you. Ask them to:  Listen to you and give you encouragement. This can keep you from feeling hopeless or alone. Help with small daily tasks or with bigger problems. A helping hand can keep you from feeling overwhelmed. Help you manage a health problem. For example, ask them to go to doctor visits with you. Your loved ones can offer support by being involved in your medical care. Respect your relationships  A good relationship is also a two-way street. You count on help from others, but they also count on you. Know your friends' limits. You don't have to see or call your friends every day. If you are going through a rough patch, ask friends if you can contact them outside of the usual boundaries. Don't always complain or talk about yourself. Know when it's time to stop talking and listen or just enjoy your friend's company. Know that good friends can be a bad influence. For example, if a friend encourages you to drink when you know it will harm you, you may want to end the friendship. Where can you learn more? Go to http://www.woods.com/ and enter G092 to learn more about \"Learning About Emotional Support. \"  Current as of: February 9, 2022               Content Version: 13.5  © 3436-6206 [employer]. Care instructions adapted under license by [facility] ([facility]). If you have questions about a medical condition or this instruction, always ask your healthcare professional. [address] any warranty or liability for your use of this information. Learning About Stress  What is stress? Stress is what you feel when you have to handle more than you are used to. Stress is a fact of life for most people, and it affects everyone differently. What causes stress for you may not be stressful for someone else. A lot of things can cause stress. You may feel stress when you go on a job interview, take a test, or run a race. This kind of short-term stress is normal and even useful. It can help you if you need to work hard or react quickly. For example, stress can help you finish an important job on time. Stress also can last a long time. Long-term stress is caused by stressful situations or events. Examples of long-term stress include long-term health problems, ongoing problems at work, or conflicts in your family. Long-term stress can harm your health. How does stress affect your health? When you are stressed, your body responds as though you are in danger. It makes hormones that speed up your heart, make you breathe faster, and give you a burst of energy. This is called the fight-or-flight stress response. If the stress is over quickly, your body goes back to normal and no harm is done. But if stress happens too often or lasts too long, it can have bad effects. Long-term stress can make you more likely to get sick, and it can make symptoms of some diseases worse. If you tense up when you are stressed, you may develop neck, shoulder, or low back pain. Stress is linked to high blood pressure and heart disease. Stress also harms your emotional health. It can make you kearney, tense, or depressed.  Your relationships may suffer, and you may not do well at work or school. What can you do to manage stress? How to relax your mind   Write. It may help to write about things that are bothering you. This helps you find out how much stress you feel and what is causing it. When you know this, you can find better ways to cope. Let your feelings out. Talk, laugh, cry, and express anger when you need to. Talking with friends, family, a counselor, or a member of the clergy about your feelings is a healthy way to relieve stress. Do something you enjoy. For example, listen to music or go to a movie. Practice your hobby or do volunteer work. Meditate. This can help you relax, because you are not worrying about what happened before or what may happen in the future. Do guided imagery. Imagine yourself in any setting that helps you feel calm. You can use audiotapes, books, or a teacher to guide you. How to relax your body   Do something active. Exercise or activity can help reduce stress. Walking is a great way to get started. Even everyday activities such as housecleaning or yard work can help. Do breathing exercises. For example:  From a standing position, bend forward from the waist with your knees slightly bent. Let your arms dangle close to the floor. Breathe in slowly and deeply as you return to a standing position. Roll up slowly and lift your head last.  Hold your breath for just a few seconds in the standing position. Breathe out slowly and bend forward from the waist.  Try yoga or brandon chi. These techniques combine exercise and meditation. You may need some training at first to learn them. What can you do to prevent stress? Manage your time. This helps you find time to do the things you want and need to do. Get enough sleep. Your body recovers from the stresses of the day while you are sleeping. Get support. Your family, friends, and community can make a difference in how you experience stress.   Where can you learn more? Go to http://www.castano.com/ and enter N032 to learn more about \"Learning About Stress. \"  Current as of: October 6, 2021               Content Version: 13.5  © 2006-2022 Healthwise, [employer] Care instructions adapted under license by [facility] ([facility]). If you have questions about a medical condition or this instruction, always ask your healthcare professional. [address] any warranty or liability for your use of this information. Learning About Managing Anger  What causes anger? Many things can cause anger: Stress at work or at home. Social situations that make you angry. A response to everyday events. Anger signals your body to prepare for a fight. This reaction is often called \"fight or flight. \" When you get angry, adrenaline and other hormones are released into your blood. Then your blood pressure goes up, your heart beats faster, and you breathe faster. When you express anger in a healthy way, it can inspire change and make you productive. But if you don't have the skills to express anger in a healthy way, anger can build up. You may hurt others--and yourself--emotionally and even physically. Violent behavior often starts with verbal threats or fairly minor incidents. But over time, it can involve physical harm. It can include physical, verbal, or sexual abuse of an intimate partner (domestic violence), a child (child abuse), or an older adult (elder abuse). It can also make you sick. Anger and constant hostility keep your blood pressure high. They increase your chances of having another health problem, such as depression, a heart attack, or a stroke. Some people with post-traumatic stress disorder (PTSD) feel angry and on alert all the time. It may feel like there are no other ways to react when you are angry. But when you learn to work with anger in appropriate and healthy ways, your anger no longer controls you.   How can you manage your anger?  The first step to managing anger is to be more aware of it. Note the thoughts, feelings, and emotions that you have when you get angry. Practice noticing these signs of anger when you are calm. If you are more aware of the signs of anger, you can take steps to manage it. Here are a few tips:  Think before you act. Take time to stop and cool down when you feel yourself getting angry. Count to 10 while you take slow, steady breaths. Practice some other form of mental relaxation.  Learn the feelings that lead to angry outbursts. Anger and hostility may be a symptom of unhappy feelings or depression about your job, your relationship, or other aspects of your personal life.  Avoid situations that lead to angry outbursts. If standing in line bothers you, do errands at less busy times.  Express anger in a healthy way. You might:  Go for a short walk or jog.  Draw, paint, or listen to music to release the anger.  Write in a daily journal.  Use \"I\" statements, not \"you\" statements, to discuss your anger. Say \"I don't feel valued when my needs are not being met\" instead of \"You make me mad when you are so inconsiderate.\"  Take care of yourself.  Exercise regularly.  Eat a variety of healthy foods. Don't skip meals.  Try to get 8 hours of sleep each night.  Limit your use of alcohol, and don't use drugs.  Practice yoga, meditation, or brandon chi to relax.  Explore other resources that may be available through your job or your community.  Contact your human resources department at work. You might be able to get services through an employee assistance program.  Contact your local hospital, mental health facility, or health department. Ask what types of programs or support groups are available in your area.  Do not keep guns in your home. If you must have guns in your home, unload them and lock them up. Lock ammunition in a separate place. Keep guns away from children.  Where can you find help?  If anger or stress starts to  harm your work or personal relationships, you might seek help. You can learn ways to manage your feelings and actions. Talk to someone you trust, or find a counselor. There are groups in your area that can connect you with people to talk to. Behavioral Health Treatment Services . This service from the national Substance Abuse and [address] can help you find local counselors. Search online at veriCAR. BuysideFXa.gov or call 5-039-619-EZDOCTOR (190 482 191), or [employer] Planning 8-576.177.1535. Parents Anonymous. Self-help groups that serve parents under stress, as well as children who have been abused, are available throughout the [place] [facility], [place] ([facility]), and [facility]. To find a group in your area, search online or in your phone book under Parents Anonymous or call (447) 632-0192. Where can you learn more? Go to http://www.castano.com/ and enter Z357 to learn more about \"Learning About Managing Anger. \"  Current as of: February 9, 2022               Content Version: 13.5  © 5452-7571 Healthwise, [employer]. Care instructions adapted under license by [facility] ([facility]). If you have questions about a medical condition or this instruction, always ask your healthcare professional. [place] [address] any warranty or liability for your use of this information. Learning About Dental Care for Older Adults  Dental care for older adults: Overview  Dental care for older people is much the same as for younger adults. But older adults do have concerns that younger adults do not. Older adults may have problems with gum disease and decay on the roots of their teeth. They may need missing teeth replaced or broken fillings fixed. Or they may have dentures that need to be cared for. Some older adults may have trouble holding a toothbrush. You can help remind the person you are caring for to brush and floss their teeth or to clean their dentures.  In some cases, you may need to do the brushing and other dental care tasks. People who have trouble using their hands or who have dementia may need this extra help. How can you help with dental care? Normal dental care  To keep the teeth and gums healthy:  Brush the teeth with fluoride toothpaste twice a day--in the morning and at night--and floss at least once a day. Plaque can quickly build up on the teeth of older adults. Watch for the signs of gum disease. These signs include gums that bleed after brushing or after eating hard foods, such as apples. See a dentist regularly. Many experts recommend checkups every 6 months. Keep the dentist up to date on any new medications the person is taking. Encourage a balanced diet that includes whole grains, vegetables, and fruits, and that is low in saturated fat and sodium. Encourage the person you're caring for not to use tobacco products. They can affect dental and general health. Many older adults have a fixed income and feel that they can't afford dental care. But most [facility] and [facility] have programs in which dentists help older adults by lowering fees. Contact your area's public health offices or  for information about dental care in your area. Using a toothbrush  Older adults with arthritis sometimes have trouble brushing their teeth because they can't easily hold the toothbrush. Their hands and fingers may be stiff, painful, or weak. If this is the case, you can: Offer an electric toothbrush. Enlarge the handle of a non-electric toothbrush by wrapping a sponge, an elastic bandage, or adhesive tape around it. Push the toothbrush handle through a ball made of rubber or soft foam.  Make the handle longer and thicker by taping Popsicle sticks or tongue depressors to it. You may also be able to buy special toothbrushes, toothpaste dispensers, and floss holders. Your doctor may recommend a soft-bristle toothbrush if the person you care for bleeds easily.  Bleeding can happen because of a health problem or from certain medicines. A toothpaste for sensitive teeth may help if the person you care for has sensitive teeth. How do you brush and floss someone's teeth? If the person you are caring for has a hard time cleaning their teeth on their own, you may need to brush and floss their teeth for them. It may be easiest to have the person sit and face away from you, and to sit or stand behind them. That way you can steady their head against your arm as you reach around to floss and brush their teeth. Choose a place that has good lighting and is comfortable for both of you. Before you begin, gather your supplies. You will need gloves, floss, a toothbrush, and a container to hold water if you are not near a sink. Wash and dry your hands well and put on gloves. Start by flossing:  Gently work a piece of floss between each of the teeth toward the gums. A plastic flossing tool may make this easier, and they are available at most [facility]. Curve the floss around each tooth into a U-shape and gently slide it under the gum line. Move the floss firmly up and down several times to scrape off the plaque. After you've finished flossing, throw away the used floss and begin brushing:  Wet the brush and apply toothpaste. Place the brush at a 45-degree angle where the teeth meet the gums. Press firmly, and move the brush in small circles over the surface of the teeth. Be careful not to brush too hard. Vigorous brushing can make the gums pull away from the teeth and can scratch the tooth enamel. Brush all surfaces of the teeth, on the tongue side and on the cheek side. Pay special attention to the front teeth and all surfaces of the back teeth. Brush chewing surfaces with short back-and-forth strokes. After you've finished, help the person rinse the remaining toothpaste from their mouth. Where can you learn more?   Go to http://www.woods.com/ and enter F944 to learn more about \"Learning About Dental Care for Older Adults. \"  Current as of: June 16, 2022               Content Version: 13.5  © 2006-2022 Healthwise, [employer]. Care instructions adapted under license by [facility] ([facility]). If you have questions about a medical condition or this instruction, always ask your healthcare professional. [address] any warranty or liability for your use of this information. Learning About Vision Tests  What are vision tests? The four most common vision tests are visual acuity tests, refraction, visual field tests, and color vision tests. Visual acuity (sharpness) tests  These tests are used: To see if you need glasses or contact lenses. To monitor an eye problem. To check an eye injury. Visual acuity tests are done as part of routine exams. You may also have this test when you get your 's license or apply for some types of jobs. Visual field tests  These tests are used: To check for vision loss in any area of your range of vision. To screen for certain eye diseases. To look for nerve damage after a stroke, head injury, or other problem that could reduce blood flow to the brain. Refraction and color tests  A refraction test is done to find the right prescription for glasses and contact lenses. A color vision test is done to check for color blindness. Color vision is often tested as part of a routine exam. You may also have this test when you apply for a job where recognizing different colors is important, such as , electronics, or the [employer]. How are vision tests done? Visual acuity test   You cover one eye at a time. You read aloud from a wall chart across the room. You read aloud from a small card that you hold in your hand. Refraction   You look into a special device. The device puts lenses of different strengths in front of each eye to see how strong your glasses or contact lenses need to be.   Visual field tests   Your doctor may have you look through special machines. Or your doctor may simply have you stare straight ahead while they move a finger into and out of your field of vision. Color vision test   You look at pieces of printed test patterns in various colors. You say what number or symbol you see. Your doctor may have you trace the number or symbol using a pointer. How do these tests feel? There is very little chance of having a problem from this test. If dilating drops are used for a vision test, they may make the eyes sting and cause a medicine taste in the mouth. Follow-up care is a key part of your treatment and safety. Be sure to make and go to all appointments, and call your doctor if you are having problems. It's also a good idea to know your test results and keep a list of the medicines you take. Where can you learn more? Go to http://www.castano.com/ and enter G551 to learn more about \"Learning About Vision Tests. \"  Current as of: October 12, 2022               Content Version: 13.5  © 2006-2022 [employer]. Care instructions adapted under license by [facility] ([facility]). If you have questions about a medical condition or this instruction, always ask your healthcare professional. [place] [address] any warranty or liability for your use of this information. Advance Directives: Care Instructions  Overview  An advance directive is a legal way to state your wishes at the end of your life. It tells your family and your doctor what to do if you can't say what you want. There are two main types of advance directives. You can change them any time your wishes change. Living will. This form tells your family and your doctor your wishes about life support and other treatment. The form is also called a declaration. Medical power of . This form lets you name a person to make treatment decisions for you when you can't speak for yourself.  This person is called a health care agent (health care proxy, health care surrogate). The form is also called a durable power of  for health care. If you do not have an advance directive, decisions about your medical care may be made by a family member, or by a doctor or a  who doesn't know you. It may help to think of an advance directive as a gift to the people who care for you. If you have one, they won't have to make tough decisions by themselves. For more information, including forms for your state, see the [address] website (www.caringinfo.org/planning/advance-directives/). Follow-up care is a key part of your treatment and safety. Be sure to make and go to all appointments, and call your doctor if you are having problems. It's also a good idea to know your test results and keep a list of the medicines you take. What should you include in an advance directive? Many states have a unique advance directive form. (It may ask you to address specific issues.) Or you might use a universal form that's approved by many states. If your form doesn't tell you what to address, it may be hard to know what to include in your advance directive. Use the questions below to help you get started. Who do you want to make decisions about your medical care if you are not able to? What life-support measures do you want if you have a serious illness that gets worse over time or can't be cured? What are you most afraid of that might happen? (Maybe you're afraid of having pain, losing your independence, or being kept alive by machines.)  Where would you prefer to die? (Your home? A hospital? A nursing home?)  Do you want to donate your organs when you die? Do you want certain Rastafari practices performed before you die? When should you call for help? Be sure to contact your doctor if you have any questions. Where can you learn more?   Go to http://www.Ipsum.com/ and enter R264 to learn more about \"Advance Directives: Care Instructions. \"  Current as of: June 16, 2022               Content Version: 13.5  © 0690-4438 [employer]. Care instructions adapted under license by [facility] ([facility]). If you have questions about a medical condition or this instruction, always ask your healthcare professional. [address] any warranty or liability for your use of this information. A Healthy Heart: Care Instructions  Your Care Instructions     Coronary artery disease, also called heart disease, occurs when a substance called plaque builds up in the vessels that supply oxygen-rich blood to your heart muscle. This can narrow the blood vessels and reduce blood flow. A heart attack happens when blood flow is completely blocked. A high-fat diet, smoking, and other factors increase the risk of heart disease. Your doctor has found that you have a chance of having heart disease. You can do lots of things to keep your heart healthy. It may not be easy, but you can change your diet, exercise more, and quit smoking. These steps really work to lower your chance of heart disease. Follow-up care is a key part of your treatment and safety. Be sure to make and go to all appointments, and call your doctor if you are having problems. It's also a good idea to know your test results and keep a list of the medicines you take. How can you care for yourself at home? Diet    Use less salt when you cook and eat. This helps lower your blood pressure. Taste food before salting. Add only a little salt when you think you need it. With time, your taste buds will adjust to less salt.     Eat fewer snack items, fast foods, canned soups, and other high-salt, high-fat, processed foods.     Read food labels and try to avoid saturated and trans fats. They increase your risk of heart disease by raising cholesterol levels.     Limit the amount of solid fat-butter, margarine, and shortening-you eat.  Use olive, peanut, or canola oil when you cook. Bake, broil, and steam foods instead of frying them.     Eat a variety of fruit and vegetables every day. Dark green, deep orange, red, or yellow fruits and vegetables are especially good for you. Examples include spinach, carrots, peaches, and berries.     Foods high in fiber can reduce your cholesterol and provide important vitamins and minerals. High-fiber foods include whole-grain cereals and breads, oatmeal, beans, brown rice, citrus fruits, and apples.     Eat lean proteins. Heart-healthy proteins include seafood, lean meats and poultry, eggs, beans, peas, nuts, seeds, and soy products.     Limit drinks and foods with added sugar. These include candy, desserts, and soda pop. Lifestyle changes    If your doctor recommends it, get more exercise. Walking is a good choice. Bit by bit, increase the amount you walk every day. Try for at least 30 minutes on most days of the week. You also may want to swim, bike, or do other activities.     Do not smoke. If you need help quitting, talk to your doctor about stop-smoking programs and medicines. These can increase your chances of quitting for good. Quitting smoking may be the most important step you can take to protect your heart. It is never too late to quit.     Limit alcohol to 2 drinks a day for men and 1 drink a day for women. Too much alcohol can cause health problems.     Manage other health problems such as diabetes, high blood pressure, and high cholesterol. If you think you may have a problem with alcohol or drug use, talk to your doctor. Medicines    Take your medicines exactly as prescribed. Call your doctor if you think you are having a problem with your medicine.     If your doctor recommends aspirin, take the amount directed each day. Make sure you take aspirin and not another kind of pain reliever, such as acetaminophen (Tylenol). When should you call for help? Call 911 if you have symptoms of a heart attack.  These may include:    Chest pain or pressure, or a strange feeling in the chest.     Sweating.     Shortness of breath.     Pain, pressure, or a strange feeling in the back, neck, jaw, or upper belly or in one or both shoulders or arms.     Lightheadedness or sudden weakness.     A fast or irregular heartbeat. After you call 911, the  may tell you to chew 1 adult-strength or 2 to 4 low-dose aspirin. Wait for an ambulance. Do not try to drive yourself. Watch closely for changes in your health, and be sure to contact your doctor if you have any problems. Where can you learn more? Go to http://www.castano.com/ and enter F075 to learn more about \"A Healthy Heart: Care Instructions. \"  Current as of: September 7, 2022               Content Version: 13.5  © 3147-8654 [employer]wise[employer]. Care instructions adapted under license by [facility] ([facility]). If you have questions about a medical condition or this instruction, always ask your healthcare professional. [place] [address] any warranty or liability for your use of this information. Personalized Preventive Plan for Gwendolyn Bauer - 2/24/2023  Medicare offers a range of preventive health benefits. Some of the tests and screenings are paid in full while other may be subject to a deductible, co-insurance, and/or copay. Some of these benefits include a comprehensive review of your medical history including lifestyle, illnesses that may run in your family, and various assessments and screenings as appropriate. After reviewing your medical record and screening and assessments performed today your provider may have ordered immunizations, labs, imaging, and/or referrals for you. A list of these orders (if applicable) as well as your Preventive Care list are included within your After Visit Summary for your review.     Other Preventive Recommendations:    A preventive eye exam performed by an eye specialist is recommended every 1-2 years to screen for glaucoma; cataracts, macular degeneration, and other eye disorders. A preventive dental visit is recommended every 6 months. Try to get at least 150 minutes of exercise per week or 10,000 steps per day on a pedometer . Order or download the FREE \"Exercise & Physical Activity: Your Everyday Guide\" from [employer]. Call 3-231.229.6917 or search [employer] online. You need 2517-0692 mg of calcium and 7761-7473 IU of vitamin D per day. It is possible to meet your calcium requirement with diet alone, but a vitamin D supplement is usually necessary to meet this goal.  When exposed to the sun, use a sunscreen that protects against both UVA and UVB radiation with an SPF of 30 or greater. Reapply every 2 to 3 hours or after sweating, drying off with a towel, or swimming. Always wear a seat belt when traveling in a car. Always wear a helmet when riding a bicycle or motorcycle. Learning About Lung Cancer Screening  What is screening for lung cancer? Lung cancer screening is a way to find some lung cancers early, before a person has any symptoms of the cancer. Lung cancer screening may help those who have the highest risk for lung cancer--people age 48 and older who are or were heavy smokers. For most people, who aren't at increased risk, screening for lung cancer probably isn't helpful. Screening won't prevent cancer. And it may not find all lung cancers. Lung cancer screening may lower the risk of dying from lung cancer in a small number of people. How is it done? Lung cancer screening is done with a low-dose CT (computed tomography) scan. A CT scan uses X-rays, or radiation, to make detailed pictures of your body. Experts recommend that screening be done in medical centers that focus on finding and treating lung cancer. Who is screening recommended for? Lung cancer screening is recommended for people age 48 and older who are or were heavy smokers.  That means people with a smoking history of at least 20 pack years. A pack year is a way to measure how heavy a smoker you are or were.  To figure out your pack years, multiply how many packs a day on average (assuming 20 cigarettes per pack) you have smoked by how many years you have smoked. For example:  If you smoked 1 pack a day for 20 years, that's 1 times 20. So you have a smoking history of 20 pack years.  If you smoked 2 packs a day for 10 years, that's 2 times 10. So you have a smoking history of 20 pack years.  Experts agree that screening is for people who have a high risk of lung cancer. But experts don't agree on what high risk means. Some say people age 50 or older with at least a 20-pack-year smoking history are high risk. Others say it's people age 55 or older with a 30-pack-year history.  To see if you could benefit from screening, first find out if you are at high risk for lung cancer. Your doctor can help you decide your lung cancer risk.  What are the risks of screening?  CT screening for lung cancer isn't perfect. It can show an abnormal result when it turns out there wasn't any cancer. This is called a false-positive result. This means you may need more tests to make sure you don't have cancer. These tests can be harmful and cause a lot of worry.  These tests may include more CT scans and invasive testing like a lung biopsy. In a biopsy, the doctor takes a sample of tissue from inside your lung so it can be looked at under a microscope. A biopsy is the only way to tell if you have lung cancer. If the biopsy finds cancer, you and your doctor will have to decide how or whether to treat it.  Some lung cancers found on CT scans are harmless and would not have caused a problem if they had not been found through screening. But because doctors can't tell which ones will turn out to be harmless, most will be treated. This means that you may get treatment--including surgery, radiation, or chemotherapy--that you  don't need. There is a risk of damage to cells or tissue from being exposed to radiation, including the small amounts used in CTs, X-rays, and other medical tests. Over time, exposure to radiation may cause cancer and other health problems. But in most cases, the risk of getting cancer from being exposed to small amounts of radiation is low. It's not a reason to avoid these tests for most people. What are the benefits of screening? Your scan may be normal (negative). For some people who are at higher risk, screening lowers the chance of dying of lung cancer. How much and how long you smoked helps to determine your risk level. Screening can find some cancers early, when treatment may be more likely to work. What happens after screening? The results of your CT scan will be sent to your doctor. Someone from your care team will explain the results of your scan and answer any questions you may have. If you need any follow-up, he or she will help you understand what to do next. After a lung cancer screening, you can go back to your usual activities right away. A lung cancer screening test can't tell if you have lung cancer. If your results are positive, your doctor can't tell whether an abnormal finding is a harmless nodule, cancer, or something else without doing more tests. What can you do to help prevent lung cancer? Some lung cancers can't be prevented. But if you smoke, quitting smoking is the best step you can take to prevent lung cancer. If you want to quit, your doctor can recommend medicines or other ways to help. Follow-up care is a key part of your treatment and safety. Be sure to make and go to all appointments, and call your doctor if you are having problems. It's also a good idea to know your test results and keep a list of the medicines you take. Where can you learn more?   Go to http://www.castano.com/ and enter Q940 to learn more about \"Learning About Lung Cancer Screening. \"  Current as of: May 4, 2022               Content Version: 13.5  © 4682-6843 Healthwise, Incorporated. Care instructions adapted under license by [facility] ([facility]). If you have questions about a medical condition or this instruction, always ask your healthcare professional. [address] any warranty or liability for your use of this information.

## 2023-02-27 ENCOUNTER — TRANSCRIBE ORDERS (OUTPATIENT)
Dept: ADMINISTRATIVE | Facility: HOSPITAL | Age: 73
End: 2023-02-27
Payer: MEDICARE

## 2023-02-27 DIAGNOSIS — Z87.891 PERSONAL HISTORY OF TOBACCO USE, PRESENTING HAZARDS TO HEALTH: Primary | ICD-10-CM

## 2023-03-05 ASSESSMENT — ENCOUNTER SYMPTOMS
EYE REDNESS: 0
ABDOMINAL PAIN: 0
SINUS PRESSURE: 0
COUGH: 1
SHORTNESS OF BREATH: 1
EYE DISCHARGE: 0
BACK PAIN: 0
ABDOMINAL DISTENTION: 0

## 2023-03-07 DIAGNOSIS — J30.89 NON-SEASONAL ALLERGIC RHINITIS: ICD-10-CM

## 2023-03-07 RX ORDER — LEVOCETIRIZINE DIHYDROCHLORIDE 5 MG/1
TABLET, FILM COATED ORAL
Qty: 90 TABLET | Refills: 3 | Status: SHIPPED | OUTPATIENT
Start: 2023-03-07

## 2023-03-16 DIAGNOSIS — R63.4 WEIGHT LOSS: ICD-10-CM

## 2023-03-16 DIAGNOSIS — R05.3 CHRONIC COUGH: Primary | ICD-10-CM

## 2023-03-17 ENCOUNTER — TRANSCRIBE ORDERS (OUTPATIENT)
Dept: ADMINISTRATIVE | Facility: HOSPITAL | Age: 73
End: 2023-03-17
Payer: MEDICARE

## 2023-03-17 DIAGNOSIS — R05.3 CHRONIC COUGH: Primary | ICD-10-CM

## 2023-03-28 ENCOUNTER — HOSPITAL ENCOUNTER (OUTPATIENT)
Dept: CT IMAGING | Facility: HOSPITAL | Age: 73
Discharge: HOME | End: 2023-03-28
Admitting: INTERNAL MEDICINE
Payer: MEDICARE

## 2023-03-28 DIAGNOSIS — R05.3 CHRONIC COUGH: ICD-10-CM

## 2023-03-28 PROCEDURE — 71250 CT THORAX DX C-: CPT

## 2023-04-06 DIAGNOSIS — R05.1 ACUTE COUGH: ICD-10-CM

## 2023-04-06 DIAGNOSIS — J47.9 BRONCHIECTASIS WITHOUT COMPLICATION (HCC): Primary | ICD-10-CM

## 2023-04-17 PROBLEM — K44.9 HIATAL HERNIA: Status: ACTIVE | Noted: 2023-04-17

## 2023-04-17 PROBLEM — R91.8 MULTIPLE LUNG NODULES ON CT: Chronic | Status: ACTIVE | Noted: 2023-04-17

## 2023-04-17 PROBLEM — Z87.891 PERSONAL HISTORY OF NICOTINE DEPENDENCE: Chronic | Status: ACTIVE | Noted: 2023-04-17

## 2023-04-17 PROBLEM — Z91.09 HISTORY OF ENVIRONMENTAL ALLERGIES: Status: ACTIVE | Noted: 2023-04-17

## 2023-04-17 PROBLEM — K21.9 GERD WITHOUT ESOPHAGITIS: Chronic | Status: ACTIVE | Noted: 2023-04-17

## 2023-04-17 PROBLEM — K21.9 GERD WITHOUT ESOPHAGITIS: Status: ACTIVE | Noted: 2023-04-17

## 2023-04-17 PROBLEM — R91.8 MULTIPLE LUNG NODULES ON CT: Status: ACTIVE | Noted: 2023-04-17

## 2023-04-17 PROBLEM — J47.9 BRONCHIECTASIS WITHOUT COMPLICATION: Status: ACTIVE | Noted: 2023-04-17

## 2023-04-17 PROBLEM — Z87.891 PERSONAL HISTORY OF NICOTINE DEPENDENCE: Status: ACTIVE | Noted: 2023-04-17

## 2023-04-17 PROBLEM — J47.9 BRONCHIECTASIS WITHOUT COMPLICATION: Chronic | Status: ACTIVE | Noted: 2023-04-17

## 2023-04-17 PROBLEM — Z91.09 HISTORY OF ENVIRONMENTAL ALLERGIES: Chronic | Status: ACTIVE | Noted: 2023-04-17

## 2023-04-17 PROBLEM — K44.9 HIATAL HERNIA: Chronic | Status: ACTIVE | Noted: 2023-04-17
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
## 2023-04-27 ENCOUNTER — OFFICE VISIT (OUTPATIENT)
Dept: INTERNAL MEDICINE | Age: 73
End: 2023-04-27
Payer: MEDICARE

## 2023-04-27 VITALS
HEART RATE: 73 BPM | DIASTOLIC BLOOD PRESSURE: 82 MMHG | SYSTOLIC BLOOD PRESSURE: 136 MMHG | BODY MASS INDEX: 15.55 KG/M2 | WEIGHT: 85 LBS | OXYGEN SATURATION: 96 %

## 2023-04-27 DIAGNOSIS — M54.16 LUMBAR RADICULOPATHY: Primary | ICD-10-CM

## 2023-04-27 PROCEDURE — 96372 THER/PROPH/DIAG INJ SC/IM: CPT | Performed by: INTERNAL MEDICINE

## 2023-04-27 PROCEDURE — 1123F ACP DISCUSS/DSCN MKR DOCD: CPT | Performed by: INTERNAL MEDICINE

## 2023-04-27 PROCEDURE — 99214 OFFICE O/P EST MOD 30 MIN: CPT | Performed by: INTERNAL MEDICINE

## 2023-04-27 RX ORDER — METHYLPREDNISOLONE ACETATE 80 MG/ML
80 INJECTION, SUSPENSION INTRA-ARTICULAR; INTRALESIONAL; INTRAMUSCULAR; SOFT TISSUE ONCE
Status: COMPLETED | OUTPATIENT
Start: 2023-04-27 | End: 2023-04-27

## 2023-04-27 RX ORDER — MELOXICAM 7.5 MG/1
7.5 TABLET ORAL 2 TIMES DAILY
Qty: 60 TABLET | Refills: 0 | Status: SHIPPED | OUTPATIENT
Start: 2023-04-27

## 2023-04-27 RX ADMIN — METHYLPREDNISOLONE ACETATE 80 MG: 80 INJECTION, SUSPENSION INTRA-ARTICULAR; INTRALESIONAL; INTRAMUSCULAR; SOFT TISSUE at 11:25

## 2023-04-27 ASSESSMENT — ENCOUNTER SYMPTOMS: BACK PAIN: 1

## 2023-04-27 NOTE — PROGRESS NOTES
After obtaining consent, and per orders of Dr. Dana Babin, injection of Depo Medrol 80 given in Dorsogluteal Right by Maru Herring MA.  Patient tolerated injection
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
## 2023-05-02 ENCOUNTER — PATIENT ROUNDING (BHMG ONLY) (OUTPATIENT)
Dept: PULMONOLOGY | Facility: CLINIC | Age: 73
End: 2023-05-02
Payer: MEDICARE

## 2023-05-02 ENCOUNTER — OFFICE VISIT (OUTPATIENT)
Dept: PULMONOLOGY | Facility: CLINIC | Age: 73
End: 2023-05-02
Payer: MEDICARE

## 2023-05-02 VITALS
BODY MASS INDEX: 16.3 KG/M2 | HEIGHT: 60 IN | HEART RATE: 83 BPM | DIASTOLIC BLOOD PRESSURE: 82 MMHG | SYSTOLIC BLOOD PRESSURE: 138 MMHG | OXYGEN SATURATION: 96 % | WEIGHT: 83 LBS

## 2023-05-02 DIAGNOSIS — K44.9 HIATAL HERNIA: Chronic | ICD-10-CM

## 2023-05-02 DIAGNOSIS — Z91.09 HISTORY OF ENVIRONMENTAL ALLERGIES: Chronic | ICD-10-CM

## 2023-05-02 DIAGNOSIS — K21.00 GASTROESOPHAGEAL REFLUX DISEASE WITH ESOPHAGITIS WITHOUT HEMORRHAGE: Chronic | ICD-10-CM

## 2023-05-02 DIAGNOSIS — Z87.891 PERSONAL HISTORY OF NICOTINE DEPENDENCE: Chronic | ICD-10-CM

## 2023-05-02 DIAGNOSIS — J47.9 BRONCHIECTASIS WITHOUT COMPLICATION: Primary | Chronic | ICD-10-CM

## 2023-05-02 DIAGNOSIS — R91.8 MULTIPLE LUNG NODULES ON CT: Chronic | ICD-10-CM

## 2023-05-02 RX ORDER — FLUTICASONE FUROATE, UMECLIDINIUM BROMIDE AND VILANTEROL TRIFENATATE 100; 62.5; 25 UG/1; UG/1; UG/1
1 POWDER RESPIRATORY (INHALATION)
Qty: 2 EACH | Refills: 0 | COMMUNITY
Start: 2023-05-02 | End: 2023-05-16

## 2023-05-02 RX ORDER — LANSOPRAZOLE 30 MG/1
CAPSULE, DELAYED RELEASE ORAL
COMMUNITY
Start: 2023-03-07

## 2023-05-02 RX ORDER — LEVOCETIRIZINE DIHYDROCHLORIDE 5 MG/1
TABLET, FILM COATED ORAL
COMMUNITY
Start: 2023-03-07

## 2023-05-02 RX ORDER — ASPIRIN 81 MG/1
1 TABLET ORAL DAILY
COMMUNITY

## 2023-05-02 RX ORDER — ALBUTEROL SULFATE 2.5 MG/3ML
2.5 SOLUTION RESPIRATORY (INHALATION)
COMMUNITY
Start: 2022-12-07

## 2023-05-02 RX ORDER — LEVOTHYROXINE SODIUM 0.03 MG/1
TABLET ORAL
COMMUNITY
Start: 2023-04-13

## 2023-05-02 RX ORDER — MELOXICAM 7.5 MG/1
TABLET ORAL
COMMUNITY
Start: 2023-04-27

## 2023-05-02 NOTE — PROGRESS NOTES
May 2, 2023    Hello, may I speak with Leila Rubin?    My name is Norma     I am  with [facility]  [address]  [facility] [address] [phone].    Before we get started may I verify your date of birth? 1950    I am calling to officially welcome you to our practice and ask about your recent visit. Is this a good time to talk? Yes    Tell me about your visit with us. What things went well? I enjoyed the talk. I found out things I didn't know.        We're always looking for ways to make our patients' experiences even better. Do you have recommendations on ways we may improve?  No not that I can think of    Overall were you satisfied with your first visit to our practice? Yes       I appreciate you taking the time to speak with me today. Is there anything else I can do for you? No      Thank you, and have a great day.

## 2023-05-04 ENCOUNTER — APPOINTMENT (OUTPATIENT)
Dept: CT IMAGING | Age: 73
End: 2023-05-04
Payer: MEDICARE

## 2023-05-04 ENCOUNTER — HOSPITAL ENCOUNTER (OUTPATIENT)
Dept: WOMENS IMAGING | Age: 73
Discharge: HOME | End: 2023-05-04
Payer: MEDICARE

## 2023-05-04 ENCOUNTER — HOSPITAL ENCOUNTER (OUTPATIENT)
Dept: MRI IMAGING | Age: 73
Discharge: HOME | End: 2023-05-04
Payer: MEDICARE

## 2023-05-04 DIAGNOSIS — M54.16 LUMBAR RADICULOPATHY: ICD-10-CM

## 2023-05-04 DIAGNOSIS — Z12.31 ENCOUNTER FOR SCREENING MAMMOGRAM FOR BREAST CANCER: ICD-10-CM

## 2023-05-04 PROCEDURE — 77067 SCR MAMMO BI INCL CAD: CPT | Performed by: GENERAL PRACTICE

## 2023-05-04 PROCEDURE — 72148 MRI LUMBAR SPINE W/O DYE: CPT

## 2023-05-04 PROCEDURE — 77063 BREAST TOMOSYNTHESIS BI: CPT

## 2023-05-23 NOTE — PROGRESS NOTES
Chief Complaint  Bronchiectasis without complication    Subjective    History of Present Illness {CC  Problem List  Visit Diagnosis   Encounters  Notes  Medications  Labs  Result Review Imaging  Media: 23}    Leila Rubin presents to [facility] for:    History of Present Illness  Management of bronchiectasis.  Her primary complaint is cough.  She indicates its been present for several years but worse more recently.  It is typically present when she first wakes up in the morning.  She produces thick clear to white mucus.  It is better after she takes the Trelegy I recently provided to her.  She typically does not cough for the remainder of the day.  She would like a prescription sent to the pharmacy.  She is a former smoker.  She quit in 2013.  She smoked 1 pack/day for 30 years.  No PFTs on file.  Most recent CT March 2023 showing multiple nodules, largest 5 mm however there was some groundglass changes to the right lower lobe measuring 1.2 cm.  Bronchiectasis noted.  Moderate hiatal hernia noted as well as fluid in the esophagus.  She is on Prevacid for reflux.  She indicates she takes it every morning with the rest of her medications.  She was encouraged to start taking it first thing in the morning on an empty stomach.  She has been doing this.  She continues to have reflux.  She reported occasional trouble swallowing solid foods.  No trouble with liquids.  We discussed GI referral if no better.  Since then her PCP has obtained a CT scan of her abdomen.  It showed a narrowing or defect in the proximal portion of the stomach.  Further evaluation and/or endoscopy recommended.  She has not been referred to anyone yet.     Prior to Admission medications    Medication Sig Start Date End Date Taking? Authorizing Provider   albuterol (PROVENTIL) (2.5 MG/3ML) 0.083% nebulizer solution 2.5 mg. 12/7/22   Provider, MD Andrez   aspirin 81 MG EC tablet  "Take 1 tablet by mouth Daily.    Andrez Marinelli MD   lansoprazole (PREVACID) 30 MG capsule  3/7/23   Andrez Marinelli MD   levocetirizine (XYZAL) 5 MG tablet  3/7/23   Andrez Marinelli MD   levothyroxine (SYNTHROID, LEVOTHROID) 25 MCG tablet TAKE ONE TABLET BY MOUTH IN THE MORNING ON AN EMPTY STOMACH 4/13/23   Andrez Marinelli MD   meloxicam (MOBIC) 7.5 MG tablet  4/27/23   Andrez Marinelli MD   sertraline (ZOLOFT) 50 MG tablet Take 1 tablet by mouth every night at bedtime. 11/11/22   Andrez Marinelli MD       Social History     Socioeconomic History    Marital status:    Tobacco Use    Smoking status: Former     Packs/day: 1.00     Years: 30.00     Pack years: 30.00     Types: Cigarettes     Quit date: 2013     Years since quitting: 10.4     Passive exposure: Past    Smokeless tobacco: Never       Objective   Vital Signs:   /78   Pulse 83   Ht 149.9 cm (59\")   Wt 38.1 kg (84 lb)   SpO2 96% Comment: RA  BMI 16.97 kg/m²     Physical Exam  Constitutional:       Appearance: She is cachectic.   Eyes:      Comments: Glasses   Cardiovascular:      Rate and Rhythm: Normal rate and regular rhythm.      Heart sounds: No murmur heard.  Pulmonary:      Effort: Pulmonary effort is normal.      Breath sounds: Normal breath sounds.   Musculoskeletal:      Right lower leg: No edema.      Left lower leg: No edema.      Comments: Kyphosis   Neurological:      Mental Status: She is alert and oriented to person, place, and time.      Result Review :      My interpretation of the PFT : none    No results found for this or any previous visit.    CT Chest Without Contrast Diagnostic (06/12/2023 15:01) CT Abdomen Pelvis Without Contrast (06/12/2023 15:01)     My interpretation of imaging: Slight increase in right upper lobe infiltrate, persistent right middle lobe and right lower lobe infiltrate, moderate hiatal hernia, filling defect narrowing in the stomach    AFB Culture (06/07/2023 " "13:00) Respiratory Culture (06/07/2023 13:00)     My interpretation of labs: Respiratory culture/AFB negative      Assessment and Plan {CC Problem List  Visit Diagnosis  ROS  Review (Popup)  Health Maintenance  Quality  BestPractice  Medications  SmartSets  SnapShot Encounters  Media : 23}    Diagnoses and all orders for this visit:    1. Bronchiectasis without complication (Primary)  Comments:  Cough somewhat improved on Trelegy 100.  Continue.  Prescription sent to pharmacy.  If increased trouble with mucus clearing we will add flutter device  Orders:  -     Fluticasone-Umeclidin-Vilant (Trelegy Ellipta) 100-62.5-25 MCG/ACT inhaler; Inhale 1 puff Daily for 30 days.  Dispense: 60 each; Refill: 11  -     CT Chest Without Contrast; Future    2. Multiple lung nodules on CT  Comments:  Stable to \"slightly\" increased.  Repeat CT 6 months recommended by radiology, ordered    3. GERD without esophagitis  Comments:  Symptomatic.  On Protonix.  Abnormal CT scan of her abdomen.  GI referral placed.  Patient requested [facility]  Orders:  -     Ambulatory Referral to Gastroenterology    4. Hiatal hernia  Comments:  Symptomatic. On Protonix. Abnormal CT scan of her abdomen. GI referral placed. Patient requested [facility]  Orders:  -     Ambulatory Referral to Gastroenterology    5. Personal history of nicotine dependence  Comments:  Continue to avoid smoking    6. Abnormal CT of the abdomen  Comments:  Symptomatic. On Protonix. Abnormal CT scan of her abdomen. GI referral placed. Patient requested [facility]  Orders:  -     Ambulatory Referral to Gastroenterology    7. Lung nodule  Comments:  Radiology recommends 6-month follow-up.  CT ordered  Orders:  -     CT Chest Without Contrast; Future      Body mass index is 16.97 kg/m².      Charting time spent: 5  Bedside time spent: Trini Bauer, TIMUR  6/15/2023  16:00 CDT    Follow Up   Return in about 3 months (around 9/15/2023) for FVL with diffusion.    Patient was given " instructions and counseling regarding her condition or for health maintenance advice. Please see specific information pulled into the AVS if appropriate.

## 2023-05-26 ENCOUNTER — OFFICE VISIT (OUTPATIENT)
Dept: INTERNAL MEDICINE | Age: 73
End: 2023-05-26
Payer: MEDICARE

## 2023-05-26 VITALS
SYSTOLIC BLOOD PRESSURE: 130 MMHG | BODY MASS INDEX: 14.72 KG/M2 | OXYGEN SATURATION: 97 % | HEIGHT: 62 IN | DIASTOLIC BLOOD PRESSURE: 70 MMHG | WEIGHT: 80 LBS | HEART RATE: 74 BPM

## 2023-05-26 DIAGNOSIS — R63.4 WEIGHT LOSS: ICD-10-CM

## 2023-05-26 DIAGNOSIS — E03.9 ACQUIRED HYPOTHYROIDISM: ICD-10-CM

## 2023-05-26 DIAGNOSIS — J41.1 MUCOPURULENT CHRONIC BRONCHITIS (HCC): Primary | ICD-10-CM

## 2023-05-26 DIAGNOSIS — E55.9 VITAMIN D DEFICIENCY: ICD-10-CM

## 2023-05-26 DIAGNOSIS — D51.8 OTHER VITAMIN B12 DEFICIENCY ANEMIA: ICD-10-CM

## 2023-05-26 DIAGNOSIS — D50.9 IRON DEFICIENCY ANEMIA, UNSPECIFIED IRON DEFICIENCY ANEMIA TYPE: ICD-10-CM

## 2023-05-26 DIAGNOSIS — E78.00 PURE HYPERCHOLESTEROLEMIA: ICD-10-CM

## 2023-05-26 DIAGNOSIS — K21.00 GASTROESOPHAGEAL REFLUX DISEASE WITH ESOPHAGITIS, UNSPECIFIED WHETHER HEMORRHAGE: ICD-10-CM

## 2023-05-26 DIAGNOSIS — F34.1 DYSTHYMIA: ICD-10-CM

## 2023-05-26 LAB
25(OH)D3 SERPL-MCNC: 82.3 NG/ML
ALBUMIN SERPL-MCNC: 4.3 G/DL (ref 3.5–5.2)
ALP SERPL-CCNC: 101 U/L (ref 35–104)
ALT SERPL-CCNC: 11 U/L (ref 5–33)
ANION GAP SERPL CALCULATED.3IONS-SCNC: 14 MMOL/L (ref 7–19)
AST SERPL-CCNC: 28 U/L (ref 5–32)
BILIRUB SERPL-MCNC: 0.3 MG/DL (ref 0.2–1.2)
BUN SERPL-MCNC: 10 MG/DL (ref 8–23)
CALCIUM SERPL-MCNC: 9.6 MG/DL (ref 8.8–10.2)
CHLORIDE SERPL-SCNC: 95 MMOL/L (ref 98–111)
CO2 SERPL-SCNC: 25 MMOL/L (ref 22–29)
CREAT SERPL-MCNC: 0.8 MG/DL (ref 0.5–0.9)
CRP SERPL HS-MCNC: 1.16 MG/DL (ref 0–0.5)
ERYTHROCYTE [DISTWIDTH] IN BLOOD BY AUTOMATED COUNT: 14.1 % (ref 11.5–14.5)
ERYTHROCYTE [SEDIMENTATION RATE] IN BLOOD BY WESTERGREN METHOD: 13 MM/HR (ref 0–25)
FERRITIN SERPL-MCNC: 109.6 NG/ML (ref 13–150)
FOLATE SERPL-MCNC: 7.7 NG/ML (ref 4.8–37.3)
GLUCOSE SERPL-MCNC: 120 MG/DL (ref 74–109)
HCT VFR BLD AUTO: 33 % (ref 37–47)
HGB BLD-MCNC: 10.4 G/DL (ref 12–16)
IRON SERPL-MCNC: 48 UG/DL (ref 37–145)
MCH RBC QN AUTO: 27.4 PG (ref 27–31)
MCHC RBC AUTO-ENTMCNC: 31.5 G/DL (ref 33–37)
MCV RBC AUTO: 87.1 FL (ref 81–99)
PLATELET # BLD AUTO: 519 K/UL (ref 130–400)
PMV BLD AUTO: 11.2 FL (ref 9.4–12.3)
POTASSIUM SERPL-SCNC: 4.6 MMOL/L (ref 3.5–5)
PROT SERPL-MCNC: 7.9 G/DL (ref 6.6–8.7)
RBC # BLD AUTO: 3.79 M/UL (ref 4.2–5.4)
SODIUM SERPL-SCNC: 134 MMOL/L (ref 136–145)
TSH SERPL DL<=0.005 MIU/L-ACNC: 2.14 UIU/ML (ref 0.27–4.2)
VIT B12 SERPL-MCNC: 831 PG/ML (ref 211–946)
WBC # BLD AUTO: 11.1 K/UL (ref 4.8–10.8)

## 2023-05-26 PROCEDURE — 1123F ACP DISCUSS/DSCN MKR DOCD: CPT | Performed by: INTERNAL MEDICINE

## 2023-05-26 PROCEDURE — 99214 OFFICE O/P EST MOD 30 MIN: CPT | Performed by: INTERNAL MEDICINE

## 2023-05-26 RX ORDER — MIRTAZAPINE 7.5 MG/1
7.5 TABLET, FILM COATED ORAL NIGHTLY
Qty: 30 TABLET | Refills: 5 | Status: SHIPPED | OUTPATIENT
Start: 2023-05-26

## 2023-05-26 RX ORDER — FLUTICASONE FUROATE, UMECLIDINIUM BROMIDE AND VILANTEROL TRIFENATATE 100; 62.5; 25 UG/1; UG/1; UG/1
1 POWDER RESPIRATORY (INHALATION) DAILY
Qty: 3 EACH | Refills: 3
Start: 2023-05-26

## 2023-05-26 RX ORDER — PANTOPRAZOLE SODIUM 40 MG/1
40 TABLET, DELAYED RELEASE ORAL
Qty: 90 TABLET | Refills: 1 | Status: SHIPPED | OUTPATIENT
Start: 2023-05-26

## 2023-05-30 ENCOUNTER — TRANSCRIBE ORDERS (OUTPATIENT)
Dept: ADMINISTRATIVE | Facility: HOSPITAL | Age: 73
End: 2023-05-30

## 2023-05-30 DIAGNOSIS — R63.4 LOSS OF WEIGHT: Primary | ICD-10-CM

## 2023-05-30 DIAGNOSIS — R63.4 WEIGHT LOSS: Primary | ICD-10-CM

## 2023-05-31 ENCOUNTER — LAB (OUTPATIENT)
Dept: LAB | Facility: HOSPITAL | Age: 73
End: 2023-05-31
Payer: MEDICARE

## 2023-05-31 PROCEDURE — 87116 MYCOBACTERIA CULTURE: CPT

## 2023-05-31 PROCEDURE — 87206 SMEAR FLUORESCENT/ACID STAI: CPT

## 2023-05-31 PROCEDURE — 87205 SMEAR GRAM STAIN: CPT

## 2023-06-01 DIAGNOSIS — J47.9 BRONCHIECTASIS WITHOUT COMPLICATION: Chronic | ICD-10-CM

## 2023-06-02 LAB
BACTERIA SPEC RESP CULT: NORMAL
GRAM STN SPEC: NORMAL

## 2023-06-05 ENCOUNTER — CARE COORDINATION (OUTPATIENT)
Dept: CARE COORDINATION | Age: 73
End: 2023-06-05

## 2023-06-05 NOTE — PROGRESS NOTES
Per Kristan DING patient instructed to come by to get another specimen cup and provide another sample to the lab.

## 2023-06-05 NOTE — CARE COORDINATION
ACM contacted daughter per providers request to mail out advanced directives. ACM verified daughters identity via HIPAA communication form. ACM provided contact information for future needs. Daughter verbalized understanding. Plan     ACM to request Advance directive info by [facility]   ACM  to follow-up in one week to confirm receipt of packet.

## 2023-06-07 PROCEDURE — 87116 MYCOBACTERIA CULTURE: CPT | Performed by: NURSE PRACTITIONER

## 2023-06-07 PROCEDURE — 87070 CULTURE OTHR SPECIMN AEROBIC: CPT | Performed by: NURSE PRACTITIONER

## 2023-06-07 PROCEDURE — 87205 SMEAR GRAM STAIN: CPT | Performed by: NURSE PRACTITIONER

## 2023-06-07 PROCEDURE — 87206 SMEAR FLUORESCENT/ACID STAI: CPT | Performed by: NURSE PRACTITIONER

## 2023-06-08 ENCOUNTER — LAB REQUISITION (OUTPATIENT)
Dept: LAB | Facility: HOSPITAL | Age: 73
End: 2023-06-08
Payer: MEDICARE

## 2023-06-09 ENCOUNTER — CARE COORDINATION (OUTPATIENT)
Dept: CARE COORDINATION | Age: 73
End: 2023-06-09

## 2023-06-09 LAB
MYCOBACTERIUM SPEC CULT: NORMAL
NIGHT BLUE STAIN TISS: NORMAL

## 2023-06-09 NOTE — CARE COORDINATION
[place] ACP packet mailed to patient this date.      Althea Gunn, [address] [facility] Coordination    [address]   Cell: [phone]

## 2023-06-10 LAB
BACTERIA SPEC RESP CULT: NORMAL
GRAM STN SPEC: NORMAL

## 2023-06-12 ENCOUNTER — HOSPITAL ENCOUNTER (OUTPATIENT)
Dept: CT IMAGING | Facility: HOSPITAL | Age: 73
Discharge: HOME | End: 2023-06-12
Admitting: INTERNAL MEDICINE
Payer: MEDICARE

## 2023-06-12 DIAGNOSIS — R63.4 LOSS OF WEIGHT: ICD-10-CM

## 2023-06-12 PROCEDURE — 74176 CT ABD & PELVIS W/O CONTRAST: CPT

## 2023-06-12 PROCEDURE — 71250 CT THORAX DX C-: CPT

## 2023-06-15 ENCOUNTER — OFFICE VISIT (OUTPATIENT)
Dept: PULMONOLOGY | Facility: CLINIC | Age: 73
End: 2023-06-15
Payer: MEDICARE

## 2023-06-15 VITALS
BODY MASS INDEX: 16.93 KG/M2 | DIASTOLIC BLOOD PRESSURE: 78 MMHG | WEIGHT: 84 LBS | SYSTOLIC BLOOD PRESSURE: 130 MMHG | OXYGEN SATURATION: 96 % | HEART RATE: 83 BPM | HEIGHT: 59 IN

## 2023-06-15 DIAGNOSIS — R91.1 LUNG NODULE: ICD-10-CM

## 2023-06-15 DIAGNOSIS — K44.9 HIATAL HERNIA: Chronic | ICD-10-CM

## 2023-06-15 DIAGNOSIS — K21.9 GERD WITHOUT ESOPHAGITIS: Chronic | ICD-10-CM

## 2023-06-15 DIAGNOSIS — J47.9 BRONCHIECTASIS WITHOUT COMPLICATION: Primary | Chronic | ICD-10-CM

## 2023-06-15 DIAGNOSIS — R93.5 ABNORMAL CT OF THE ABDOMEN: ICD-10-CM

## 2023-06-15 DIAGNOSIS — R91.8 MULTIPLE LUNG NODULES ON CT: Chronic | ICD-10-CM

## 2023-06-15 DIAGNOSIS — Z87.891 PERSONAL HISTORY OF NICOTINE DEPENDENCE: Chronic | ICD-10-CM

## 2023-06-15 LAB
MYCOBACTERIUM SPEC CULT: NORMAL
NIGHT BLUE STAIN TISS: NORMAL
NIGHT BLUE STAIN TISS: NORMAL

## 2023-06-15 PROCEDURE — 1160F RVW MEDS BY RX/DR IN RCRD: CPT | Performed by: NURSE PRACTITIONER

## 2023-06-15 PROCEDURE — 99214 OFFICE O/P EST MOD 30 MIN: CPT | Performed by: NURSE PRACTITIONER

## 2023-06-15 PROCEDURE — 1159F MED LIST DOCD IN RCRD: CPT | Performed by: NURSE PRACTITIONER

## 2023-06-15 RX ORDER — FLUTICASONE FUROATE, UMECLIDINIUM BROMIDE AND VILANTEROL TRIFENATATE 100; 62.5; 25 UG/1; UG/1; UG/1
1 POWDER RESPIRATORY (INHALATION)
Qty: 60 EACH | Refills: 11 | Status: SHIPPED | OUTPATIENT
Start: 2023-06-15 | End: 2023-07-15

## 2023-06-15 RX ORDER — MIRTAZAPINE 7.5 MG/1
7.5 TABLET, FILM COATED ORAL NIGHTLY
COMMUNITY
Start: 2023-05-26

## 2023-06-15 RX ORDER — ATORVASTATIN CALCIUM 10 MG/1
10 TABLET, FILM COATED ORAL DAILY
COMMUNITY

## 2023-06-15 RX ORDER — PANTOPRAZOLE SODIUM 40 MG/1
40 TABLET, DELAYED RELEASE ORAL DAILY
COMMUNITY
Start: 2023-05-26

## 2023-06-15 RX ORDER — FLUTICASONE FUROATE, UMECLIDINIUM BROMIDE AND VILANTEROL TRIFENATATE 100; 62.5; 25 UG/1; UG/1; UG/1
1 POWDER RESPIRATORY (INHALATION) DAILY
COMMUNITY
Start: 2023-05-26 | End: 2023-06-15

## 2023-06-22 LAB
MYCOBACTERIUM SPEC CULT: NORMAL
NIGHT BLUE STAIN TISS: NORMAL
NIGHT BLUE STAIN TISS: NORMAL

## 2023-06-26 ENCOUNTER — OFFICE VISIT (OUTPATIENT)
Dept: INTERNAL MEDICINE | Age: 73
End: 2023-06-26
Payer: MEDICARE

## 2023-06-26 VITALS
OXYGEN SATURATION: 95 % | HEIGHT: 62 IN | SYSTOLIC BLOOD PRESSURE: 120 MMHG | BODY MASS INDEX: 15.27 KG/M2 | HEART RATE: 78 BPM | DIASTOLIC BLOOD PRESSURE: 70 MMHG | WEIGHT: 83 LBS

## 2023-06-26 DIAGNOSIS — J41.1 MUCOPURULENT CHRONIC BRONCHITIS (HCC): Primary | ICD-10-CM

## 2023-06-26 DIAGNOSIS — E53.8 B12 DEFICIENCY: ICD-10-CM

## 2023-06-26 DIAGNOSIS — R63.4 WEIGHT LOSS: ICD-10-CM

## 2023-06-26 DIAGNOSIS — F34.1 DYSTHYMIA: ICD-10-CM

## 2023-06-26 DIAGNOSIS — K21.9 GASTROESOPHAGEAL REFLUX DISEASE, UNSPECIFIED WHETHER ESOPHAGITIS PRESENT: ICD-10-CM

## 2023-06-26 DIAGNOSIS — D64.9 ANEMIA, UNSPECIFIED TYPE: ICD-10-CM

## 2023-06-26 PROCEDURE — 1123F ACP DISCUSS/DSCN MKR DOCD: CPT | Performed by: INTERNAL MEDICINE

## 2023-06-26 PROCEDURE — 96372 THER/PROPH/DIAG INJ SC/IM: CPT | Performed by: INTERNAL MEDICINE

## 2023-06-26 PROCEDURE — 99214 OFFICE O/P EST MOD 30 MIN: CPT | Performed by: INTERNAL MEDICINE

## 2023-06-26 RX ORDER — CYANOCOBALAMIN 1000 UG/ML
1000 INJECTION, SOLUTION INTRAMUSCULAR; SUBCUTANEOUS ONCE
Status: COMPLETED | OUTPATIENT
Start: 2023-06-26 | End: 2023-06-26

## 2023-06-26 RX ORDER — MIRTAZAPINE 7.5 MG/1
7.5 TABLET, FILM COATED ORAL NIGHTLY
Qty: 90 TABLET | Refills: 1 | Status: SHIPPED | OUTPATIENT
Start: 2023-06-26

## 2023-06-26 RX ADMIN — CYANOCOBALAMIN 1000 MCG: 1000 INJECTION, SOLUTION INTRAMUSCULAR; SUBCUTANEOUS at 15:28

## 2023-06-27 LAB
MYCOBACTERIUM SPEC CULT: NORMAL
NIGHT BLUE STAIN TISS: NORMAL
NIGHT BLUE STAIN TISS: NORMAL

## 2023-06-29 LAB
MYCOBACTERIUM SPEC CULT: NORMAL
NIGHT BLUE STAIN TISS: NORMAL
NIGHT BLUE STAIN TISS: NORMAL

## 2023-07-03 ENCOUNTER — CARE COORDINATION (OUTPATIENT)
Dept: CARE COORDINATION | Age: 73
End: 2023-07-03

## 2023-07-03 NOTE — CARE COORDINATION
ACM attempted contact with patient and unable to reach. Left voicemail with my cell number for call back. Will await return call from patient.   Plan  Confirm receipt of ACP documents     Veto Lennox RN  Ambulatory Care Manager   C. [phone]

## 2023-07-05 LAB
MYCOBACTERIUM SPEC CULT: NORMAL
NIGHT BLUE STAIN TISS: NORMAL
NIGHT BLUE STAIN TISS: NORMAL

## 2023-07-06 LAB
MYCOBACTERIUM SPEC CULT: NORMAL
NIGHT BLUE STAIN TISS: NORMAL
NIGHT BLUE STAIN TISS: NORMAL

## 2023-07-10 DIAGNOSIS — F34.1 DYSTHYMIA: ICD-10-CM

## 2023-07-12 DIAGNOSIS — F34.1 DYSTHYMIA: ICD-10-CM

## 2023-07-13 ENCOUNTER — CARE COORDINATION (OUTPATIENT)
Dept: CARE COORDINATION | Age: 73
End: 2023-07-13

## 2023-07-13 DIAGNOSIS — F34.1 DYSTHYMIA: ICD-10-CM

## 2023-07-13 LAB
MYCOBACTERIUM SPEC CULT: NORMAL
NIGHT BLUE STAIN TISS: NORMAL
NIGHT BLUE STAIN TISS: NORMAL

## 2023-07-13 NOTE — CARE COORDINATION
Another [address] ACP packet mailed to patient's home again this date.      Janice Muniz, [address] [facility] Coordination    [address] [place] [address]    Cell: [phone]

## 2023-07-13 NOTE — TELEPHONE ENCOUNTER
Chitra Suárez says that she keeps asking for this to be refilled.   It has been marked out of med list.

## 2023-07-18 RX ORDER — MELOXICAM 7.5 MG/1
TABLET ORAL
Qty: 60 TABLET | Refills: 0 | OUTPATIENT
Start: 2023-07-18

## 2023-07-27 ENCOUNTER — CARE COORDINATION (OUTPATIENT)
Dept: CARE COORDINATION | Age: 73
End: 2023-07-27

## 2023-07-27 NOTE — CARE COORDINATION
ACM attempted contact with patient and unable to reach. Left voicemail with my cell number for call back. Will await return call from patient.       [address] RN  Ambulatory Care Manager   C. [phone]

## 2023-08-08 ENCOUNTER — CARE COORDINATION (OUTPATIENT)
Dept: CARE COORDINATION | Age: 73
End: 2023-08-08

## 2023-08-08 NOTE — CARE COORDINATION
ACC: Becca Albright RN    ACM followed up with patient who reports she is doing well and confirms receipt of ACP packet. Patient confirms completing document and verbalized understanding to contact ACM with questions as they arise. Patient confirms receipt of hearing aids and reports daughter assisted with purchasing device. ACM provided application for hearing aid assistance and educated patient on importance of using case to store aids and proper ear care and importance of follow-up with audiologist. Patient verbalized understanding. Plan   ACM to discharge   Patient to follow-up with PCP and speciality providers.

## 2023-08-24 RX ORDER — IBUPROFEN 600 MG/1
TABLET ORAL
Qty: 60 TABLET | Refills: 2 | OUTPATIENT
Start: 2023-08-24

## 2023-09-07 DIAGNOSIS — F34.1 DYSTHYMIA: ICD-10-CM

## 2023-09-07 DIAGNOSIS — K21.00 GASTROESOPHAGEAL REFLUX DISEASE WITH ESOPHAGITIS, UNSPECIFIED WHETHER HEMORRHAGE: ICD-10-CM

## 2023-09-07 DIAGNOSIS — J30.89 NON-SEASONAL ALLERGIC RHINITIS: ICD-10-CM

## 2023-09-07 DIAGNOSIS — E03.9 ACQUIRED HYPOTHYROIDISM: ICD-10-CM

## 2023-09-07 DIAGNOSIS — R63.4 WEIGHT LOSS: ICD-10-CM

## 2023-09-07 RX ORDER — MIRTAZAPINE 7.5 MG/1
7.5 TABLET, FILM COATED ORAL NIGHTLY
Qty: 90 TABLET | Refills: 3 | Status: SHIPPED | OUTPATIENT
Start: 2023-09-07

## 2023-09-07 RX ORDER — PANTOPRAZOLE SODIUM 40 MG/1
40 TABLET, DELAYED RELEASE ORAL
Qty: 90 TABLET | Refills: 3 | Status: SHIPPED | OUTPATIENT
Start: 2023-09-07

## 2023-09-07 RX ORDER — ATORVASTATIN CALCIUM 40 MG/1
40 TABLET, FILM COATED ORAL DAILY
Qty: 90 TABLET | Refills: 3 | Status: SHIPPED | OUTPATIENT
Start: 2023-09-07

## 2023-09-07 RX ORDER — LEVOTHYROXINE SODIUM 0.03 MG/1
TABLET ORAL
Qty: 90 TABLET | Refills: 1 | Status: SHIPPED | OUTPATIENT
Start: 2023-09-07

## 2023-09-07 RX ORDER — LEVOCETIRIZINE DIHYDROCHLORIDE 5 MG/1
5 TABLET, FILM COATED ORAL NIGHTLY
Qty: 90 TABLET | Refills: 3 | Status: SHIPPED | OUTPATIENT
Start: 2023-09-07

## 2023-09-11 DIAGNOSIS — D64.9 ANEMIA, UNSPECIFIED TYPE: ICD-10-CM

## 2023-09-11 DIAGNOSIS — E53.8 B12 DEFICIENCY: ICD-10-CM

## 2023-09-11 LAB
ALBUMIN SERPL-MCNC: 4.4 G/DL (ref 3.5–5.2)
ALP SERPL-CCNC: 72 U/L (ref 35–104)
ALT SERPL-CCNC: 13 U/L (ref 5–33)
ANION GAP SERPL CALCULATED.3IONS-SCNC: 10 MMOL/L (ref 7–19)
AST SERPL-CCNC: 23 U/L (ref 5–32)
BILIRUB SERPL-MCNC: <0.2 MG/DL (ref 0.2–1.2)
BUN SERPL-MCNC: 8 MG/DL (ref 8–23)
CALCIUM SERPL-MCNC: 9 MG/DL (ref 8.8–10.2)
CHLORIDE SERPL-SCNC: 108 MMOL/L (ref 98–111)
CO2 SERPL-SCNC: 28 MMOL/L (ref 22–29)
CREAT SERPL-MCNC: 0.7 MG/DL (ref 0.5–0.9)
ERYTHROCYTE [DISTWIDTH] IN BLOOD BY AUTOMATED COUNT: 15.6 % (ref 11.5–14.5)
GLUCOSE SERPL-MCNC: 99 MG/DL (ref 74–109)
HCT VFR BLD AUTO: 31.3 % (ref 37–47)
HGB BLD-MCNC: 9.8 G/DL (ref 12–16)
IRON SERPL-MCNC: 55 UG/DL (ref 37–145)
MCH RBC QN AUTO: 28.3 PG (ref 27–31)
MCHC RBC AUTO-ENTMCNC: 31.3 G/DL (ref 33–37)
MCV RBC AUTO: 90.5 FL (ref 81–99)
PLATELET # BLD AUTO: 325 K/UL (ref 130–400)
PMV BLD AUTO: 11 FL (ref 9.4–12.3)
POTASSIUM SERPL-SCNC: 3.5 MMOL/L (ref 3.5–5)
PROT SERPL-MCNC: 6.9 G/DL (ref 6.6–8.7)
RBC # BLD AUTO: 3.46 M/UL (ref 4.2–5.4)
SODIUM SERPL-SCNC: 146 MMOL/L (ref 136–145)
VIT B12 SERPL-MCNC: 553 PG/ML (ref 211–946)
WBC # BLD AUTO: 6.1 K/UL (ref 4.8–10.8)

## 2023-10-23 PROBLEM — R91.8 MULTIPLE LUNG NODULES ON CT: Chronic | Status: RESOLVED | Noted: 2023-04-17 | Resolved: 2023-10-23

## 2023-11-17 ENCOUNTER — OFFICE VISIT (OUTPATIENT)
Dept: INTERNAL MEDICINE | Age: 73
End: 2023-11-17

## 2023-11-17 VITALS
HEART RATE: 75 BPM | DIASTOLIC BLOOD PRESSURE: 76 MMHG | OXYGEN SATURATION: 96 % | SYSTOLIC BLOOD PRESSURE: 125 MMHG | HEIGHT: 59 IN | WEIGHT: 86 LBS | BODY MASS INDEX: 17.34 KG/M2

## 2023-11-17 DIAGNOSIS — D51.8 OTHER VITAMIN B12 DEFICIENCY ANEMIA: ICD-10-CM

## 2023-11-17 DIAGNOSIS — Z23 INFLUENZA VACCINE NEEDED: ICD-10-CM

## 2023-11-17 DIAGNOSIS — F34.1 DYSTHYMIA: ICD-10-CM

## 2023-11-17 DIAGNOSIS — M15.9 PRIMARY OSTEOARTHRITIS INVOLVING MULTIPLE JOINTS: ICD-10-CM

## 2023-11-17 DIAGNOSIS — D64.9 ANEMIA, UNSPECIFIED TYPE: Primary | ICD-10-CM

## 2023-11-17 DIAGNOSIS — J41.1 MUCOPURULENT CHRONIC BRONCHITIS (HCC): ICD-10-CM

## 2023-11-17 DIAGNOSIS — E03.9 ACQUIRED HYPOTHYROIDISM: ICD-10-CM

## 2023-11-17 DIAGNOSIS — D64.9 ANEMIA, UNSPECIFIED TYPE: ICD-10-CM

## 2023-11-17 LAB
ALBUMIN SERPL-MCNC: 4.8 G/DL (ref 3.5–5.2)
ALP SERPL-CCNC: 82 U/L (ref 35–104)
ALT SERPL-CCNC: 14 U/L (ref 5–33)
ANION GAP SERPL CALCULATED.3IONS-SCNC: 13 MMOL/L (ref 7–19)
AST SERPL-CCNC: 26 U/L (ref 5–32)
BILIRUB SERPL-MCNC: 0.4 MG/DL (ref 0.2–1.2)
BUN SERPL-MCNC: 11 MG/DL (ref 8–23)
CALCIUM SERPL-MCNC: 9.5 MG/DL (ref 8.8–10.2)
CHLORIDE SERPL-SCNC: 100 MMOL/L (ref 98–111)
CO2 SERPL-SCNC: 26 MMOL/L (ref 22–29)
CREAT SERPL-MCNC: 1 MG/DL (ref 0.5–0.9)
ERYTHROCYTE [DISTWIDTH] IN BLOOD BY AUTOMATED COUNT: 14.3 % (ref 11.5–14.5)
FERRITIN SERPL-MCNC: 67.6 NG/ML (ref 13–150)
FOLATE SERPL-MCNC: 8.3 NG/ML (ref 4.8–37.3)
GLUCOSE SERPL-MCNC: 110 MG/DL (ref 74–109)
HCT VFR BLD AUTO: 34.4 % (ref 37–47)
HGB BLD-MCNC: 10.7 G/DL (ref 12–16)
IRON SATN MFR SERPL: 22 % (ref 14–50)
IRON SERPL-MCNC: 67 UG/DL (ref 37–145)
MCH RBC QN AUTO: 28.8 PG (ref 27–31)
MCHC RBC AUTO-ENTMCNC: 31.1 G/DL (ref 33–37)
MCV RBC AUTO: 92.5 FL (ref 81–99)
PLATELET # BLD AUTO: 380 K/UL (ref 130–400)
PMV BLD AUTO: 10.9 FL (ref 9.4–12.3)
POTASSIUM SERPL-SCNC: 3.7 MMOL/L (ref 3.5–5)
PROT SERPL-MCNC: 7.7 G/DL (ref 6.6–8.7)
RBC # BLD AUTO: 3.72 M/UL (ref 4.2–5.4)
SODIUM SERPL-SCNC: 139 MMOL/L (ref 136–145)
TIBC SERPL-MCNC: 308 UG/DL (ref 250–400)
VIT B12 SERPL-MCNC: >2000 PG/ML (ref 211–946)
WBC # BLD AUTO: 7.6 K/UL (ref 4.8–10.8)

## 2023-11-17 RX ORDER — CYANOCOBALAMIN 1000 UG/ML
1000 INJECTION, SOLUTION INTRAMUSCULAR; SUBCUTANEOUS ONCE
Status: COMPLETED | OUTPATIENT
Start: 2023-11-17 | End: 2023-11-17

## 2023-11-17 RX ORDER — BUDESONIDE, GLYCOPYRROLATE, AND FORMOTEROL FUMARATE 160; 9; 4.8 UG/1; UG/1; UG/1
2 AEROSOL, METERED RESPIRATORY (INHALATION) 2 TIMES DAILY
Qty: 1 EACH | Refills: 3
Start: 2023-11-17

## 2023-11-17 RX ORDER — METHYLPREDNISOLONE ACETATE 40 MG/ML
40 INJECTION, SUSPENSION INTRA-ARTICULAR; INTRALESIONAL; INTRAMUSCULAR; SOFT TISSUE ONCE
Status: COMPLETED | OUTPATIENT
Start: 2023-11-17 | End: 2023-11-17

## 2023-11-17 RX ADMIN — METHYLPREDNISOLONE ACETATE 40 MG: 40 INJECTION, SUSPENSION INTRA-ARTICULAR; INTRALESIONAL; INTRAMUSCULAR; SOFT TISSUE at 16:05

## 2023-11-17 RX ADMIN — CYANOCOBALAMIN 1000 MCG: 1000 INJECTION, SOLUTION INTRAMUSCULAR; SUBCUTANEOUS at 16:05
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
## 2024-01-22 ENCOUNTER — TELEPHONE (OUTPATIENT)
Dept: INTERNAL MEDICINE | Age: 74
End: 2024-01-22

## 2024-01-22 NOTE — TELEPHONE ENCOUNTER
For her housing that she stays in, they are requiring her to send them the number of office visits she had with Dr. Avery and the amount of any copays that she had for the year 2023.  Fax to her daughter at ATTN: Tatiana [phone]

## 2024-02-05 ENCOUNTER — TELEPHONE (OUTPATIENT)
Dept: INTERNAL MEDICINE | Age: 74
End: 2024-02-05

## 2024-02-05 DIAGNOSIS — J47.9 BRONCHIECTASIS WITHOUT COMPLICATION (HCC): Primary | ICD-10-CM

## 2024-02-05 RX ORDER — FLUTICASONE FUROATE, UMECLIDINIUM BROMIDE AND VILANTEROL TRIFENATATE 200; 62.5; 25 UG/1; UG/1; UG/1
1 POWDER RESPIRATORY (INHALATION) DAILY
Qty: 1 EACH | Refills: 3 | Status: SHIPPED | OUTPATIENT
Start: 2024-02-05

## 2024-02-05 NOTE — TELEPHONE ENCOUNTER
If trelegy or stioloto cheaper we can change to those --if nothing covered I could change her albuterol nebs to duonebs

## 2024-02-05 NOTE — TELEPHONE ENCOUNTER
She is needing a less costly Inhaler.  She did not say which one, but probably Irontri.   I called [facility] to see which ones are cheaper - awaiting return call.

## 2024-02-08 ENCOUNTER — TELEPHONE (OUTPATIENT)
Dept: INTERNAL MEDICINE | Age: 74
End: 2024-02-08

## 2024-02-08 RX ORDER — METHYLPREDNISOLONE 4 MG/1
TABLET ORAL
Qty: 1 KIT | Refills: 0 | Status: SHIPPED | OUTPATIENT
Start: 2024-02-08 | End: 2024-02-14

## 2024-02-08 NOTE — TELEPHONE ENCOUNTER
She did a lot of mopping the other day and is now having a lot of pain in her lower back on the right side that shoots down to her toes. Having trouble walking due to the discomfort.  She has taken Ibuprofen 600mg and it hasn't helped.  Uses [facility]

## 2024-03-06 DIAGNOSIS — F34.1 DYSTHYMIA: ICD-10-CM

## 2024-03-06 DIAGNOSIS — K21.00 GASTROESOPHAGEAL REFLUX DISEASE WITH ESOPHAGITIS, UNSPECIFIED WHETHER HEMORRHAGE: ICD-10-CM

## 2024-03-06 DIAGNOSIS — R63.4 WEIGHT LOSS: ICD-10-CM

## 2024-03-06 DIAGNOSIS — J30.89 NON-SEASONAL ALLERGIC RHINITIS: ICD-10-CM

## 2024-03-06 DIAGNOSIS — J47.9 BRONCHIECTASIS WITHOUT COMPLICATION (HCC): ICD-10-CM

## 2024-03-06 DIAGNOSIS — E03.9 ACQUIRED HYPOTHYROIDISM: ICD-10-CM

## 2024-03-06 RX ORDER — BUDESONIDE, GLYCOPYRROLATE, AND FORMOTEROL FUMARATE 160; 9; 4.8 UG/1; UG/1; UG/1
2 AEROSOL, METERED RESPIRATORY (INHALATION) 2 TIMES DAILY
Qty: 1 EACH | Refills: 3 | Status: SHIPPED | OUTPATIENT
Start: 2024-03-06

## 2024-03-06 RX ORDER — LEVOTHYROXINE SODIUM 0.03 MG/1
TABLET ORAL
Qty: 90 TABLET | Refills: 3 | Status: SHIPPED | OUTPATIENT
Start: 2024-03-06

## 2024-03-06 RX ORDER — MIRTAZAPINE 7.5 MG/1
7.5 TABLET, FILM COATED ORAL NIGHTLY
Qty: 90 TABLET | Refills: 3 | Status: SHIPPED | OUTPATIENT
Start: 2024-03-06

## 2024-03-06 RX ORDER — FLUTICASONE FUROATE, UMECLIDINIUM BROMIDE AND VILANTEROL TRIFENATATE 200; 62.5; 25 UG/1; UG/1; UG/1
1 POWDER RESPIRATORY (INHALATION) DAILY
Qty: 1 EACH | Refills: 3 | Status: SHIPPED | OUTPATIENT
Start: 2024-03-06

## 2024-03-06 RX ORDER — PANTOPRAZOLE SODIUM 40 MG/1
40 TABLET, DELAYED RELEASE ORAL
Qty: 90 TABLET | Refills: 3 | Status: SHIPPED | OUTPATIENT
Start: 2024-03-06

## 2024-03-06 RX ORDER — ATORVASTATIN CALCIUM 40 MG/1
40 TABLET, FILM COATED ORAL DAILY
Qty: 90 TABLET | Refills: 3 | Status: SHIPPED | OUTPATIENT
Start: 2024-03-06

## 2024-03-06 RX ORDER — LEVOCETIRIZINE DIHYDROCHLORIDE 5 MG/1
5 TABLET, FILM COATED ORAL NIGHTLY
Qty: 90 TABLET | Refills: 3 | Status: SHIPPED | OUTPATIENT
Start: 2024-03-06

## 2024-03-29 ENCOUNTER — OFFICE VISIT (OUTPATIENT)
Dept: INTERNAL MEDICINE | Age: 74
End: 2024-03-29

## 2024-03-29 VITALS
HEART RATE: 72 BPM | BODY MASS INDEX: 16.56 KG/M2 | SYSTOLIC BLOOD PRESSURE: 125 MMHG | WEIGHT: 90 LBS | OXYGEN SATURATION: 96 % | DIASTOLIC BLOOD PRESSURE: 80 MMHG | HEIGHT: 62 IN

## 2024-03-29 DIAGNOSIS — E03.9 ACQUIRED HYPOTHYROIDISM: ICD-10-CM

## 2024-03-29 DIAGNOSIS — D50.9 IRON DEFICIENCY ANEMIA, UNSPECIFIED IRON DEFICIENCY ANEMIA TYPE: ICD-10-CM

## 2024-03-29 DIAGNOSIS — Z00.00 MEDICARE ANNUAL WELLNESS VISIT, SUBSEQUENT: Primary | ICD-10-CM

## 2024-03-29 DIAGNOSIS — D64.9 ANEMIA, UNSPECIFIED TYPE: ICD-10-CM

## 2024-03-29 DIAGNOSIS — Z23 NEED FOR PROPHYLACTIC VACCINATION AND INOCULATION AGAINST VARICELLA: ICD-10-CM

## 2024-03-29 DIAGNOSIS — E53.8 B12 DEFICIENCY: ICD-10-CM

## 2024-03-29 DIAGNOSIS — M47.892 OTHER OSTEOARTHRITIS OF SPINE, CERVICAL REGION: ICD-10-CM

## 2024-03-29 DIAGNOSIS — E55.9 VITAMIN D DEFICIENCY: ICD-10-CM

## 2024-03-29 DIAGNOSIS — E78.00 PURE HYPERCHOLESTEROLEMIA: ICD-10-CM

## 2024-03-29 DIAGNOSIS — J41.1 MUCOPURULENT CHRONIC BRONCHITIS (HCC): ICD-10-CM

## 2024-03-29 DIAGNOSIS — Z87.891 PERSONAL HISTORY OF TOBACCO USE: ICD-10-CM

## 2024-03-29 DIAGNOSIS — J47.9 BRONCHIECTASIS WITHOUT COMPLICATION (HCC): ICD-10-CM

## 2024-03-29 LAB
25(OH)D3 SERPL-MCNC: 42.4 NG/ML
ALBUMIN SERPL-MCNC: 4.7 G/DL (ref 3.5–5.2)
ALP SERPL-CCNC: 80 U/L (ref 35–104)
ALT SERPL-CCNC: 20 U/L (ref 5–33)
ANION GAP SERPL CALCULATED.3IONS-SCNC: 12 MMOL/L (ref 7–19)
AST SERPL-CCNC: 28 U/L (ref 5–32)
BILIRUB SERPL-MCNC: 0.4 MG/DL (ref 0.2–1.2)
BUN SERPL-MCNC: 6 MG/DL (ref 8–23)
CALCIUM SERPL-MCNC: 9.4 MG/DL (ref 8.8–10.2)
CHLORIDE SERPL-SCNC: 102 MMOL/L (ref 98–111)
CHOLEST SERPL-MCNC: 171 MG/DL (ref 160–199)
CO2 SERPL-SCNC: 28 MMOL/L (ref 22–29)
CREAT SERPL-MCNC: 0.9 MG/DL (ref 0.5–0.9)
ERYTHROCYTE [DISTWIDTH] IN BLOOD BY AUTOMATED COUNT: 13.6 % (ref 11.5–14.5)
FERRITIN SERPL-MCNC: 77.6 NG/ML (ref 13–150)
FOLATE SERPL-MCNC: 6.3 NG/ML (ref 4.8–37.3)
GLUCOSE SERPL-MCNC: 104 MG/DL (ref 74–109)
HCT VFR BLD AUTO: 33.2 % (ref 37–47)
HDLC SERPL-MCNC: 81 MG/DL (ref 65–121)
HGB BLD-MCNC: 10.4 G/DL (ref 12–16)
IRON SATN MFR SERPL: 20 % (ref 14–50)
IRON SERPL-MCNC: 58 UG/DL (ref 37–145)
LDLC SERPL CALC-MCNC: 72 MG/DL
MCH RBC QN AUTO: 28.8 PG (ref 27–31)
MCHC RBC AUTO-ENTMCNC: 31.3 G/DL (ref 33–37)
MCV RBC AUTO: 92 FL (ref 81–99)
PLATELET # BLD AUTO: 375 K/UL (ref 130–400)
PMV BLD AUTO: 10.3 FL (ref 9.4–12.3)
POTASSIUM SERPL-SCNC: 3.1 MMOL/L (ref 3.5–5)
PROT SERPL-MCNC: 7 G/DL (ref 6.6–8.7)
RBC # BLD AUTO: 3.61 M/UL (ref 4.2–5.4)
SODIUM SERPL-SCNC: 142 MMOL/L (ref 136–145)
TIBC SERPL-MCNC: 286 UG/DL (ref 250–400)
TRIGL SERPL-MCNC: 92 MG/DL (ref 0–149)
TSH SERPL DL<=0.005 MIU/L-ACNC: 1.88 UIU/ML (ref 0.27–4.2)
VIT B12 SERPL-MCNC: >2000 PG/ML (ref 211–946)
WBC # BLD AUTO: 6 K/UL (ref 4.8–10.8)

## 2024-03-29 RX ORDER — POTASSIUM CHLORIDE 750 MG/1
10 TABLET, EXTENDED RELEASE ORAL DAILY
Qty: 30 TABLET | Refills: 0 | Status: SHIPPED | OUTPATIENT
Start: 2024-03-29

## 2024-03-29 RX ORDER — CYANOCOBALAMIN 1000 UG/ML
1000 INJECTION, SOLUTION INTRAMUSCULAR; SUBCUTANEOUS ONCE
Status: COMPLETED | OUTPATIENT
Start: 2024-03-29 | End: 2024-03-29

## 2024-03-29 RX ADMIN — CYANOCOBALAMIN 1000 MCG: 1000 INJECTION, SOLUTION INTRAMUSCULAR; SUBCUTANEOUS at 15:05

## 2024-03-29 SDOH — ECONOMIC STABILITY: FOOD INSECURITY: WITHIN THE PAST 12 MONTHS, YOU WORRIED THAT YOUR FOOD WOULD RUN OUT BEFORE YOU GOT MONEY TO BUY MORE.: NEVER TRUE

## 2024-03-29 SDOH — ECONOMIC STABILITY: FOOD INSECURITY: WITHIN THE PAST 12 MONTHS, THE FOOD YOU BOUGHT JUST DIDN'T LAST AND YOU DIDN'T HAVE MONEY TO GET MORE.: NEVER TRUE

## 2024-03-29 SDOH — ECONOMIC STABILITY: INCOME INSECURITY: HOW HARD IS IT FOR YOU TO PAY FOR THE VERY BASICS LIKE FOOD, HOUSING, MEDICAL CARE, AND HEATING?: NOT HARD AT ALL

## 2024-03-29 ASSESSMENT — LIFESTYLE VARIABLES
HOW OFTEN DO YOU HAVE A DRINK CONTAINING ALCOHOL: NEVER
HOW MANY STANDARD DRINKS CONTAINING ALCOHOL DO YOU HAVE ON A TYPICAL DAY: PATIENT DOES NOT DRINK

## 2024-03-29 ASSESSMENT — PATIENT HEALTH QUESTIONNAIRE - PHQ9
2. FEELING DOWN, DEPRESSED OR HOPELESS: NOT AT ALL
3. TROUBLE FALLING OR STAYING ASLEEP: NOT AT ALL
9. THOUGHTS THAT YOU WOULD BE BETTER OFF DEAD, OR OF HURTING YOURSELF: NOT AT ALL
10. IF YOU CHECKED OFF ANY PROBLEMS, HOW DIFFICULT HAVE THESE PROBLEMS MADE IT FOR YOU TO DO YOUR WORK, TAKE CARE OF THINGS AT HOME, OR GET ALONG WITH OTHER PEOPLE: NOT DIFFICULT AT ALL
4. FEELING TIRED OR HAVING LITTLE ENERGY: NOT AT ALL
1. LITTLE INTEREST OR PLEASURE IN DOING THINGS: NOT AT ALL
SUM OF ALL RESPONSES TO PHQ9 QUESTIONS 1 & 2: 0
5. POOR APPETITE OR OVEREATING: NOT AT ALL
SUM OF ALL RESPONSES TO PHQ QUESTIONS 1-9: 0
6. FEELING BAD ABOUT YOURSELF - OR THAT YOU ARE A FAILURE OR HAVE LET YOURSELF OR YOUR FAMILY DOWN: NOT AT ALL
7. TROUBLE CONCENTRATING ON THINGS, SUCH AS READING THE NEWSPAPER OR WATCHING TELEVISION: NOT AT ALL
8. MOVING OR SPEAKING SO SLOWLY THAT OTHER PEOPLE COULD HAVE NOTICED. OR THE OPPOSITE, BEING SO FIGETY OR RESTLESS THAT YOU HAVE BEEN MOVING AROUND A LOT MORE THAN USUAL: NOT AT ALL
SUM OF ALL RESPONSES TO PHQ QUESTIONS 1-9: 0

## 2024-03-29 NOTE — PROGRESS NOTES
levocetirizine (XYZAL) 5 MG tablet Take 1 tablet by mouth nightly Yes Meghan Colindres MD   levothyroxine (SYNTHROID) 25 MCG tablet Take 1 tablet each morning Yes Meghan Colindres MD   mirtazapine (REMERON) 7.5 MG tablet Take 1 tablet by mouth nightly Yes Meghan Colindres MD   pantoprazole (PROTONIX) 40 MG tablet Take 1 tablet by mouth every morning (before breakfast) Yes Meghan Colindres MD   sertraline (ZOLOFT) 50 MG tablet Take 1 tablet by mouth at bedtime Yes Meghan Colindres MD   albuterol (PROVENTIL) (2.5 MG/3ML) 0.083% nebulizer solution Take 3 mLs by nebulization every 6 hours as needed for Wheezing Yes Meghan Colindres MD   albuterol sulfate HFA (VENTOLIN HFA) 108 (90 Base) MCG/ACT inhaler Inhale 2 puffs into the lungs every 6 hours as needed for Wheezing Yes Meghan Colindres MD   potassium chloride (KLOR-CON M) 10 MEQ extended release tablet Take 1 tablet by mouth daily  Meghan Colindres MD   meloxicam (MOBIC) 7.5 MG tablet Take 1 tablet by mouth in the morning and at bedtime  Luis Miguel Gonsalez MD   ibuprofen (ADVIL;MOTRIN) 600 MG tablet TAKE ONE (1) TABLET BY MOUTH 2 TIMES DAILY AS NEEDED FOR PAIN  Meghan Colindres MD   lansoprazole (PREVACID) 30 MG delayed release capsule Take ONE (1) capsule by mouth daily  Meghan Colindres MD   SUMAtriptan (IMITREX) 50 MG tablet Take 1 prn headache and may repeat times 1 in 2 hours - dont exceed 2 pills in 1 day  Meghan Colindres MD   azelastine (ASTELIN) 0.1 % nasal spray 2 sprays by Nasal route 2 times daily Use in each nostril as directed  Meghan Colindres MD   vitamin D (ERGOCALCIFEROL) 1.25 MG (29343 UT) CAPS capsule Take ONE (1) capsule by mouth once A week  Meghan Colindres MD   tiotropium (SPIRIVA RESPIMAT) 2.5 MCG/ACT AERS inhaler Inhale 2 puffs into the lungs daily  Meghan Colindres MD   fluticasone (FLONASE) 50 MCG/ACT nasal spray INSTILL 2 SPRAYS IN EACH NOSTRIL ONCE DAILY  Mgehan Colindres MD   COMBIVENT RESPIMAT  MCG/ACT AERS inhaler INHALE TWO PUFFS INTO THE LUNGS EVERY SIX

## 2024-03-29 NOTE — PATIENT INSTRUCTIONS
anger is to be more aware of it. Note the thoughts, feelings, and emotions that you have when you get angry. Practice noticing these signs of anger when you are calm. If you are more aware of the signs of anger, you can take steps to manage it. Here are a few tips:  Think before you act. Take time to stop and cool down when you feel yourself getting angry. Count to 10 while you take slow, steady breaths. Practice some other form of mental relaxation.  Learn the feelings that lead to angry outbursts. Anger and hostility may be a symptom of unhappy feelings or depression about your job, your relationship, or other aspects of your personal life.  Try to avoid situations that lead to angry outbursts. If standing in line bothers you, do errands at less busy times.  Express anger in a healthy way. You might:  Go for a short walk or jog.  Draw, paint, or listen to music to help release the anger.  Write in a journal.  Use \"I\" statements, not \"you\" statements, to discuss your anger. Say \"I don't feel valued when my needs are not being met\" instead of \"You make me mad when you are so inconsiderate.\"  Take care of yourself.  Exercise regularly.  Eat a variety of healthy foods. Don't skip meals.  Try to get 8 hours of sleep each night.  Limit your use of alcohol, and avoid using drugs.  Practice yoga, meditation, or brandon chi to relax.  Explore other resources that may be available through your job or your community.  Contact your human resources department at work. You might be able to get services through an employee assistance program.  Contact your local hospital, mental health facility, or health department. Ask what types of programs or support groups are available in your area.  Do not keep guns in your home. If you must have guns in your home, unload them and lock them up. Lock ammunition in a separate place. Keep guns away from children.  Where can you find help?  If anger or stress starts to harm your work or personal

## 2024-04-13 PROBLEM — J41.0 SIMPLE CHRONIC BRONCHITIS (HCC): Status: ACTIVE | Noted: 2024-04-13

## 2024-04-13 PROBLEM — J41.0 SIMPLE CHRONIC BRONCHITIS (HCC): Status: RESOLVED | Noted: 2024-04-13 | Resolved: 2024-04-13

## 2024-04-13 ASSESSMENT — ENCOUNTER SYMPTOMS
SINUS PRESSURE: 0
SHORTNESS OF BREATH: 1
ABDOMINAL PAIN: 0
ABDOMINAL DISTENTION: 0
COUGH: 1
EYE REDNESS: 0
EYE DISCHARGE: 0

## 2024-07-29 ENCOUNTER — OFFICE VISIT (OUTPATIENT)
Dept: INTERNAL MEDICINE | Age: 74
End: 2024-07-29
Payer: COMMERCIAL

## 2024-07-29 VITALS
DIASTOLIC BLOOD PRESSURE: 88 MMHG | SYSTOLIC BLOOD PRESSURE: 152 MMHG | WEIGHT: 85 LBS | BODY MASS INDEX: 15.55 KG/M2 | HEART RATE: 89 BPM | OXYGEN SATURATION: 95 %

## 2024-07-29 DIAGNOSIS — D50.9 IRON DEFICIENCY ANEMIA, UNSPECIFIED IRON DEFICIENCY ANEMIA TYPE: ICD-10-CM

## 2024-07-29 DIAGNOSIS — J41.1 MUCOPURULENT CHRONIC BRONCHITIS (HCC): Primary | ICD-10-CM

## 2024-07-29 DIAGNOSIS — J41.1 MUCOPURULENT CHRONIC BRONCHITIS (HCC): ICD-10-CM

## 2024-07-29 DIAGNOSIS — F34.1 DYSTHYMIA: ICD-10-CM

## 2024-07-29 DIAGNOSIS — E87.6 HYPOKALEMIA: ICD-10-CM

## 2024-07-29 DIAGNOSIS — D51.8 OTHER VITAMIN B12 DEFICIENCY ANEMIA: ICD-10-CM

## 2024-07-29 LAB
ALBUMIN SERPL-MCNC: 4.6 G/DL (ref 3.5–5.2)
ALP SERPL-CCNC: 82 U/L (ref 35–104)
ALT SERPL-CCNC: 12 U/L (ref 5–33)
ANION GAP SERPL CALCULATED.3IONS-SCNC: 19 MMOL/L (ref 7–19)
AST SERPL-CCNC: 24 U/L (ref 5–32)
BILIRUB SERPL-MCNC: 0.4 MG/DL (ref 0.2–1.2)
BUN SERPL-MCNC: 10 MG/DL (ref 8–23)
CALCIUM SERPL-MCNC: 9.5 MG/DL (ref 8.8–10.2)
CHLORIDE SERPL-SCNC: 99 MMOL/L (ref 98–111)
CO2 SERPL-SCNC: 23 MMOL/L (ref 22–29)
CREAT SERPL-MCNC: 0.8 MG/DL (ref 0.5–0.9)
ERYTHROCYTE [DISTWIDTH] IN BLOOD BY AUTOMATED COUNT: 13.3 % (ref 11.5–14.5)
FERRITIN SERPL-MCNC: 63.4 NG/ML (ref 13–150)
GLUCOSE SERPL-MCNC: 93 MG/DL (ref 74–109)
HCT VFR BLD AUTO: 36.1 % (ref 37–47)
HGB BLD-MCNC: 10.9 G/DL (ref 12–16)
IRON SATN MFR SERPL: 23 % (ref 14–50)
IRON SERPL-MCNC: 65 UG/DL (ref 37–145)
MAGNESIUM SERPL-MCNC: 2.4 MG/DL (ref 1.6–2.4)
MCH RBC QN AUTO: 27.7 PG (ref 27–31)
MCHC RBC AUTO-ENTMCNC: 30.2 G/DL (ref 33–37)
MCV RBC AUTO: 91.6 FL (ref 81–99)
PLATELET # BLD AUTO: 400 K/UL (ref 130–400)
PMV BLD AUTO: 10 FL (ref 9.4–12.3)
POTASSIUM SERPL-SCNC: 3 MMOL/L (ref 3.5–5)
PROT SERPL-MCNC: 7.8 G/DL (ref 6.6–8.7)
RBC # BLD AUTO: 3.94 M/UL (ref 4.2–5.4)
SODIUM SERPL-SCNC: 141 MMOL/L (ref 136–145)
TIBC SERPL-MCNC: 281 UG/DL (ref 250–400)
WBC # BLD AUTO: 7.5 K/UL (ref 4.8–10.8)

## 2024-07-29 PROCEDURE — 96372 THER/PROPH/DIAG INJ SC/IM: CPT | Performed by: INTERNAL MEDICINE

## 2024-07-29 PROCEDURE — 99214 OFFICE O/P EST MOD 30 MIN: CPT | Performed by: INTERNAL MEDICINE

## 2024-07-29 PROCEDURE — 1123F ACP DISCUSS/DSCN MKR DOCD: CPT | Performed by: INTERNAL MEDICINE

## 2024-07-29 RX ORDER — CYANOCOBALAMIN 1000 UG/ML
1000 INJECTION, SOLUTION INTRAMUSCULAR; SUBCUTANEOUS ONCE
Qty: 1 ML | Refills: 0 | Status: SHIPPED | OUTPATIENT
Start: 2024-07-29 | End: 2024-07-29

## 2024-07-29 RX ORDER — CYANOCOBALAMIN 1000 UG/ML
1000 INJECTION, SOLUTION INTRAMUSCULAR; SUBCUTANEOUS ONCE
Status: COMPLETED | OUTPATIENT
Start: 2024-07-29 | End: 2024-07-29

## 2024-07-29 RX ORDER — ALBUTEROL SULFATE 90 UG/1
2 AEROSOL, METERED RESPIRATORY (INHALATION) EVERY 6 HOURS PRN
Qty: 54 G | Refills: 3 | Status: SHIPPED | OUTPATIENT
Start: 2024-07-29

## 2024-07-29 RX ADMIN — CYANOCOBALAMIN 1000 MCG: 1000 INJECTION, SOLUTION INTRAMUSCULAR; SUBCUTANEOUS at 15:23

## 2024-07-29 NOTE — PROGRESS NOTES
cyanocobalamin 1000 MCG/ML injection; Inject 1 mL into the muscle once for 1 dose  Dispense: 1 mL; Refill: 0  - cyanocobalamin injection 1,000 mcg

## 2024-07-30 DIAGNOSIS — E87.6 HYPOKALEMIA: Primary | ICD-10-CM

## 2024-08-17 RX ORDER — POTASSIUM CHLORIDE 750 MG/1
10 TABLET, EXTENDED RELEASE ORAL DAILY
Qty: 90 TABLET | Refills: 3 | Status: SHIPPED | OUTPATIENT
Start: 2024-08-17

## 2024-08-20 ENCOUNTER — TELEPHONE (OUTPATIENT)
Dept: INTERNAL MEDICINE | Age: 74
End: 2024-08-20

## 2024-08-20 DIAGNOSIS — D64.9 ANEMIA, UNSPECIFIED TYPE: Primary | ICD-10-CM

## 2024-08-20 NOTE — TELEPHONE ENCOUNTER
----- Message from Dr. Meghan Colindres MD sent at 8/17/2024  4:50 PM CDT -----  They were supposed to add a magnesium to her lab from 7/29 but it does not look like they did-- please ask her to bring all of her pill bottles to the office the next apt so we can be sure not on a fluid pil--- ask her if she is getting lasix or hctz from somewhere else--- have her come to lab to get repeat lab this week and in the meantime add back potassium 20meq 1 tablet daily.  _ I sent potassium to pharmacy.

## 2024-09-03 DIAGNOSIS — E87.6 HYPOKALEMIA: ICD-10-CM

## 2024-09-03 DIAGNOSIS — D64.9 ANEMIA, UNSPECIFIED TYPE: ICD-10-CM

## 2024-09-03 LAB
ALBUMIN SERPL-MCNC: 4.4 G/DL (ref 3.5–5.2)
ALP SERPL-CCNC: 71 U/L (ref 35–104)
ALT SERPL-CCNC: 9 U/L (ref 5–33)
ANION GAP SERPL CALCULATED.3IONS-SCNC: 11 MMOL/L (ref 7–19)
AST SERPL-CCNC: 21 U/L (ref 5–32)
BILIRUB SERPL-MCNC: 0.2 MG/DL (ref 0.2–1.2)
BUN SERPL-MCNC: 7 MG/DL (ref 8–23)
CALCIUM SERPL-MCNC: 10.1 MG/DL (ref 8.8–10.2)
CHLORIDE SERPL-SCNC: 94 MMOL/L (ref 98–111)
CO2 SERPL-SCNC: 26 MMOL/L (ref 22–29)
CREAT SERPL-MCNC: 0.7 MG/DL (ref 0.5–0.9)
GLUCOSE SERPL-MCNC: 95 MG/DL (ref 70–99)
MAGNESIUM SERPL-MCNC: 2.1 MG/DL (ref 1.6–2.4)
POTASSIUM SERPL-SCNC: 5 MMOL/L (ref 3.5–5)
PROT SERPL-MCNC: 7.8 G/DL (ref 6.4–8.3)
SODIUM SERPL-SCNC: 131 MMOL/L (ref 136–145)

## 2024-09-21 DIAGNOSIS — R63.4 WEIGHT LOSS: ICD-10-CM

## 2024-09-21 DIAGNOSIS — K21.00 GASTROESOPHAGEAL REFLUX DISEASE WITH ESOPHAGITIS, UNSPECIFIED WHETHER HEMORRHAGE: ICD-10-CM

## 2024-09-21 DIAGNOSIS — E03.9 ACQUIRED HYPOTHYROIDISM: ICD-10-CM

## 2024-09-21 DIAGNOSIS — K21.00 GASTROESOPHAGEAL REFLUX DISEASE WITH ESOPHAGITIS WITHOUT HEMORRHAGE: ICD-10-CM

## 2024-09-21 DIAGNOSIS — F34.1 DYSTHYMIA: ICD-10-CM

## 2024-09-23 RX ORDER — LANSOPRAZOLE 30 MG/1
CAPSULE, DELAYED RELEASE ORAL
Qty: 90 CAPSULE | Refills: 3 | OUTPATIENT
Start: 2024-09-23

## 2024-09-23 RX ORDER — PANTOPRAZOLE SODIUM 40 MG/1
TABLET, DELAYED RELEASE ORAL
Qty: 90 TABLET | Refills: 1 | OUTPATIENT
Start: 2024-09-23

## 2024-09-23 RX ORDER — MIRTAZAPINE 7.5 MG/1
7.5 TABLET, FILM COATED ORAL NIGHTLY
Qty: 90 TABLET | Refills: 3 | Status: SHIPPED | OUTPATIENT
Start: 2024-09-23

## 2024-09-23 RX ORDER — LEVOTHYROXINE SODIUM 25 UG/1
TABLET ORAL
Qty: 90 TABLET | Refills: 1 | OUTPATIENT
Start: 2024-09-23

## 2024-09-23 RX ORDER — LEVOTHYROXINE SODIUM 25 UG/1
TABLET ORAL
Qty: 90 TABLET | Refills: 3 | Status: SHIPPED | OUTPATIENT
Start: 2024-09-23

## 2024-09-23 RX ORDER — PANTOPRAZOLE SODIUM 40 MG/1
40 TABLET, DELAYED RELEASE ORAL
Qty: 90 TABLET | Refills: 3 | Status: SHIPPED | OUTPATIENT
Start: 2024-09-23

## 2024-09-23 RX ORDER — MIRTAZAPINE 7.5 MG/1
TABLET, FILM COATED ORAL
Qty: 30 TABLET | Refills: 5 | OUTPATIENT
Start: 2024-09-23

## 2024-10-31 ENCOUNTER — OFFICE VISIT (OUTPATIENT)
Dept: INTERNAL MEDICINE | Age: 74
End: 2024-10-31

## 2024-10-31 ENCOUNTER — TRANSCRIBE ORDERS (OUTPATIENT)
Dept: ADMINISTRATIVE | Facility: HOSPITAL | Age: 74
End: 2024-10-31
Payer: MEDICARE

## 2024-10-31 VITALS
HEIGHT: 59 IN | HEART RATE: 70 BPM | WEIGHT: 85 LBS | SYSTOLIC BLOOD PRESSURE: 124 MMHG | DIASTOLIC BLOOD PRESSURE: 70 MMHG | OXYGEN SATURATION: 95 % | BODY MASS INDEX: 17.14 KG/M2

## 2024-10-31 DIAGNOSIS — D50.9 IRON DEFICIENCY ANEMIA, UNSPECIFIED IRON DEFICIENCY ANEMIA TYPE: Primary | ICD-10-CM

## 2024-10-31 DIAGNOSIS — D50.9 IRON DEFICIENCY ANEMIA, UNSPECIFIED IRON DEFICIENCY ANEMIA TYPE: ICD-10-CM

## 2024-10-31 DIAGNOSIS — E53.8 B12 DEFICIENCY: ICD-10-CM

## 2024-10-31 DIAGNOSIS — F34.1 DYSTHYMIA: ICD-10-CM

## 2024-10-31 DIAGNOSIS — M15.0 PRIMARY OSTEOARTHRITIS INVOLVING MULTIPLE JOINTS: ICD-10-CM

## 2024-10-31 DIAGNOSIS — R91.8 LUNG NODULES: Primary | ICD-10-CM

## 2024-10-31 DIAGNOSIS — Z23 INFLUENZA VACCINE NEEDED: ICD-10-CM

## 2024-10-31 DIAGNOSIS — D51.8 OTHER VITAMIN B12 DEFICIENCY ANEMIA: ICD-10-CM

## 2024-10-31 DIAGNOSIS — R91.8 LUNG NODULES: ICD-10-CM

## 2024-10-31 DIAGNOSIS — E03.9 ACQUIRED HYPOTHYROIDISM: ICD-10-CM

## 2024-10-31 DIAGNOSIS — E55.9 VITAMIN D DEFICIENCY: ICD-10-CM

## 2024-10-31 LAB
25(OH)D3 SERPL-MCNC: 32.6 NG/ML
ALBUMIN SERPL-MCNC: 4.6 G/DL (ref 3.5–5.2)
ALP SERPL-CCNC: 77 U/L (ref 35–104)
ALT SERPL-CCNC: 10 U/L (ref 5–33)
ANION GAP SERPL CALCULATED.3IONS-SCNC: 13 MMOL/L (ref 7–19)
AST SERPL-CCNC: 23 U/L (ref 5–32)
BILIRUB SERPL-MCNC: 0.5 MG/DL (ref 0.2–1.2)
BUN SERPL-MCNC: 9 MG/DL (ref 8–23)
CALCIUM SERPL-MCNC: 9.5 MG/DL (ref 8.8–10.2)
CHLORIDE SERPL-SCNC: 95 MMOL/L (ref 98–111)
CO2 SERPL-SCNC: 25 MMOL/L (ref 22–29)
CREAT SERPL-MCNC: 0.8 MG/DL (ref 0.5–0.9)
ERYTHROCYTE [DISTWIDTH] IN BLOOD BY AUTOMATED COUNT: 14.1 % (ref 11.5–14.5)
FERRITIN SERPL-MCNC: 126.7 NG/ML (ref 13–150)
FOLATE SERPL-MCNC: 8.9 NG/ML (ref 4.8–37.3)
GLUCOSE SERPL-MCNC: 100 MG/DL (ref 70–99)
HCT VFR BLD AUTO: 35.1 % (ref 37–47)
HGB BLD-MCNC: 11 G/DL (ref 12–16)
IRON SATN MFR SERPL: 35 % (ref 14–50)
IRON SERPL-MCNC: 97 UG/DL (ref 37–145)
MCH RBC QN AUTO: 28.2 PG (ref 27–31)
MCHC RBC AUTO-ENTMCNC: 31.3 G/DL (ref 33–37)
MCV RBC AUTO: 90 FL (ref 81–99)
PLATELET # BLD AUTO: 446 K/UL (ref 130–400)
PMV BLD AUTO: 10 FL (ref 9.4–12.3)
POTASSIUM SERPL-SCNC: 4 MMOL/L (ref 3.5–5)
PROT SERPL-MCNC: 7.9 G/DL (ref 6.4–8.3)
RBC # BLD AUTO: 3.9 M/UL (ref 4.2–5.4)
SODIUM SERPL-SCNC: 133 MMOL/L (ref 136–145)
TIBC SERPL-MCNC: 274 UG/DL (ref 250–400)
TSH SERPL DL<=0.005 MIU/L-ACNC: 2.42 UIU/ML (ref 0.27–4.2)
VIT B12 SERPL-MCNC: >2000 PG/ML (ref 232–1245)
WBC # BLD AUTO: 7.9 K/UL (ref 4.8–10.8)

## 2024-10-31 RX ORDER — CYANOCOBALAMIN 1000 UG/ML
1000 INJECTION, SOLUTION INTRAMUSCULAR; SUBCUTANEOUS ONCE
Status: COMPLETED | OUTPATIENT
Start: 2024-10-31 | End: 2024-10-31

## 2024-10-31 RX ADMIN — CYANOCOBALAMIN 1000 MCG: 1000 INJECTION, SOLUTION INTRAMUSCULAR; SUBCUTANEOUS at 14:13

## 2024-10-31 NOTE — PROGRESS NOTES
B12; Future  - Folate; Future    4. Vitamin D deficiency    - Vitamin D 25 Hydroxy; Future    5. Acquired hypothyroidism  Stable monitor  - CBC; Future  - Comprehensive Metabolic Panel; Future  - TSH; Future    6. Influenza vaccine needed    - Influenza, FLUAD Trivalent, (age 65 y+), IM, Preservative Free, 0.5mL    7. Other vitamin B12 deficiency anemia    - cyanocobalamin injection 1,000 mcg    8. Primary osteoarthritis involving multiple joints  Stable avoid NSAIDs continue current care follow    9. Dysthymia  Mood is improved    Our office spent 45+ minutes trying to get her CT precertified and found out it had to be changed to a different location new order sent

## 2024-11-20 DIAGNOSIS — R05.1 ACUTE COUGH: Primary | ICD-10-CM

## 2024-11-20 RX ORDER — AZITHROMYCIN 250 MG/1
TABLET, FILM COATED ORAL
Qty: 6 TABLET | Refills: 0 | Status: SHIPPED | OUTPATIENT
Start: 2024-11-20 | End: 2024-11-30

## 2024-11-20 NOTE — TELEPHONE ENCOUNTER
Has a cough, yellowish, greenish sputum and she does not want it going into chest and getting Bronchitis.  No fever or excess fatigue.   Zpack sent in.

## 2025-01-21 ENCOUNTER — TELEPHONE (OUTPATIENT)
Dept: INTERNAL MEDICINE | Age: 75
End: 2025-01-21

## 2025-01-21 NOTE — TELEPHONE ENCOUNTER
She needs a copy of her 2024 office visits with co pay on it if possible sent to Tatiana Rizo fax [phone].  I have asked Michelle to print this out.

## 2025-04-01 ENCOUNTER — OFFICE VISIT (OUTPATIENT)
Dept: INTERNAL MEDICINE | Age: 75
End: 2025-04-01
Payer: MEDICARE

## 2025-04-01 VITALS
HEART RATE: 76 BPM | WEIGHT: 84 LBS | BODY MASS INDEX: 16.93 KG/M2 | OXYGEN SATURATION: 96 % | DIASTOLIC BLOOD PRESSURE: 84 MMHG | HEIGHT: 59 IN | SYSTOLIC BLOOD PRESSURE: 138 MMHG

## 2025-04-01 DIAGNOSIS — E55.9 VITAMIN D DEFICIENCY: ICD-10-CM

## 2025-04-01 DIAGNOSIS — R63.4 WEIGHT LOSS: ICD-10-CM

## 2025-04-01 DIAGNOSIS — E03.9 ACQUIRED HYPOTHYROIDISM: ICD-10-CM

## 2025-04-01 DIAGNOSIS — Z87.891 PERSONAL HISTORY OF TOBACCO USE: ICD-10-CM

## 2025-04-01 DIAGNOSIS — J30.89 NON-SEASONAL ALLERGIC RHINITIS, UNSPECIFIED TRIGGER: ICD-10-CM

## 2025-04-01 DIAGNOSIS — F34.1 DYSTHYMIA: ICD-10-CM

## 2025-04-01 DIAGNOSIS — J41.1 MUCOPURULENT CHRONIC BRONCHITIS (HCC): ICD-10-CM

## 2025-04-01 DIAGNOSIS — Z00.00 MEDICARE ANNUAL WELLNESS VISIT, SUBSEQUENT: Primary | ICD-10-CM

## 2025-04-01 DIAGNOSIS — D64.9 ANEMIA, UNSPECIFIED TYPE: ICD-10-CM

## 2025-04-01 LAB
25(OH)D3 SERPL-MCNC: 32.2 NG/ML
ALBUMIN SERPL-MCNC: 4.6 G/DL (ref 3.5–5.2)
ALP SERPL-CCNC: 65 U/L (ref 35–104)
ALT SERPL-CCNC: 10 U/L (ref 10–35)
ANION GAP SERPL CALCULATED.3IONS-SCNC: 12 MMOL/L (ref 8–16)
AST SERPL-CCNC: 24 U/L (ref 10–35)
BILIRUB SERPL-MCNC: 0.3 MG/DL (ref 0.2–1.2)
BUN SERPL-MCNC: 9 MG/DL (ref 8–23)
CALCIUM SERPL-MCNC: 9.8 MG/DL (ref 8.8–10.2)
CHLORIDE SERPL-SCNC: 98 MMOL/L (ref 98–107)
CHOLEST SERPL-MCNC: 257 MG/DL (ref 0–199)
CO2 SERPL-SCNC: 25 MMOL/L (ref 22–29)
CREAT SERPL-MCNC: 0.8 MG/DL (ref 0.5–0.9)
ERYTHROCYTE [DISTWIDTH] IN BLOOD BY AUTOMATED COUNT: 13.5 % (ref 11.5–14.5)
GLUCOSE SERPL-MCNC: 116 MG/DL (ref 70–99)
HCT VFR BLD AUTO: 34.4 % (ref 37–47)
HDLC SERPL-MCNC: 68 MG/DL (ref 40–60)
HGB BLD-MCNC: 11.2 G/DL (ref 12–16)
LDLC SERPL CALC-MCNC: 153 MG/DL
MCH RBC QN AUTO: 29.4 PG (ref 27–31)
MCHC RBC AUTO-ENTMCNC: 32.6 G/DL (ref 33–37)
MCV RBC AUTO: 90.3 FL (ref 81–99)
PLATELET # BLD AUTO: 454 K/UL (ref 130–400)
PMV BLD AUTO: 9.7 FL (ref 9.4–12.3)
POTASSIUM SERPL-SCNC: 4.5 MMOL/L (ref 3.5–5.1)
PROT SERPL-MCNC: 7.8 G/DL (ref 6.4–8.3)
RBC # BLD AUTO: 3.81 M/UL (ref 4.2–5.4)
SODIUM SERPL-SCNC: 135 MMOL/L (ref 136–145)
TRIGL SERPL-MCNC: 178 MG/DL (ref 0–149)
TSH SERPL DL<=0.005 MIU/L-ACNC: 2.22 UIU/ML (ref 0.27–4.2)
WBC # BLD AUTO: 9.6 K/UL (ref 4.8–10.8)

## 2025-04-01 PROCEDURE — G0296 VISIT TO DETERM LDCT ELIG: HCPCS | Performed by: INTERNAL MEDICINE

## 2025-04-01 PROCEDURE — G0439 PPPS, SUBSEQ VISIT: HCPCS | Performed by: INTERNAL MEDICINE

## 2025-04-01 PROCEDURE — 1123F ACP DISCUSS/DSCN MKR DOCD: CPT | Performed by: INTERNAL MEDICINE

## 2025-04-01 RX ORDER — MONTELUKAST SODIUM 10 MG/1
10 TABLET ORAL DAILY
Qty: 90 TABLET | Refills: 3 | Status: SHIPPED | OUTPATIENT
Start: 2025-04-01

## 2025-04-01 RX ORDER — POTASSIUM CHLORIDE 750 MG/1
10 TABLET, EXTENDED RELEASE ORAL DAILY
Qty: 90 TABLET | Refills: 3 | Status: SHIPPED | OUTPATIENT
Start: 2025-04-01 | End: 2025-04-17

## 2025-04-01 SDOH — ECONOMIC STABILITY: FOOD INSECURITY: WITHIN THE PAST 12 MONTHS, YOU WORRIED THAT YOUR FOOD WOULD RUN OUT BEFORE YOU GOT MONEY TO BUY MORE.: NEVER TRUE

## 2025-04-01 SDOH — ECONOMIC STABILITY: FOOD INSECURITY: WITHIN THE PAST 12 MONTHS, THE FOOD YOU BOUGHT JUST DIDN'T LAST AND YOU DIDN'T HAVE MONEY TO GET MORE.: NEVER TRUE

## 2025-04-01 ASSESSMENT — PATIENT HEALTH QUESTIONNAIRE - PHQ9
9. THOUGHTS THAT YOU WOULD BE BETTER OFF DEAD, OR OF HURTING YOURSELF: NOT AT ALL
4. FEELING TIRED OR HAVING LITTLE ENERGY: SEVERAL DAYS
SUM OF ALL RESPONSES TO PHQ QUESTIONS 1-9: 6
7. TROUBLE CONCENTRATING ON THINGS, SUCH AS READING THE NEWSPAPER OR WATCHING TELEVISION: SEVERAL DAYS
10. IF YOU CHECKED OFF ANY PROBLEMS, HOW DIFFICULT HAVE THESE PROBLEMS MADE IT FOR YOU TO DO YOUR WORK, TAKE CARE OF THINGS AT HOME, OR GET ALONG WITH OTHER PEOPLE: SOMEWHAT DIFFICULT
SUM OF ALL RESPONSES TO PHQ QUESTIONS 1-9: 6
3. TROUBLE FALLING OR STAYING ASLEEP: SEVERAL DAYS
1. LITTLE INTEREST OR PLEASURE IN DOING THINGS: MORE THAN HALF THE DAYS
8. MOVING OR SPEAKING SO SLOWLY THAT OTHER PEOPLE COULD HAVE NOTICED. OR THE OPPOSITE, BEING SO FIGETY OR RESTLESS THAT YOU HAVE BEEN MOVING AROUND A LOT MORE THAN USUAL: NOT AT ALL
SUM OF ALL RESPONSES TO PHQ QUESTIONS 1-9: 6
5. POOR APPETITE OR OVEREATING: SEVERAL DAYS
SUM OF ALL RESPONSES TO PHQ QUESTIONS 1-9: 6
2. FEELING DOWN, DEPRESSED OR HOPELESS: NOT AT ALL
6. FEELING BAD ABOUT YOURSELF - OR THAT YOU ARE A FAILURE OR HAVE LET YOURSELF OR YOUR FAMILY DOWN: NOT AT ALL

## 2025-04-01 ASSESSMENT — LIFESTYLE VARIABLES
HOW MANY STANDARD DRINKS CONTAINING ALCOHOL DO YOU HAVE ON A TYPICAL DAY: PATIENT DOES NOT DRINK
HOW OFTEN DO YOU HAVE A DRINK CONTAINING ALCOHOL: NEVER

## 2025-04-01 NOTE — PROGRESS NOTES
MOUTH EVERY DAY  Meghan Colindres MD   Calcium Citrate-Vitamin D (CALCIUM + D PO) Take 1 capsule by mouth daily  Provider, MD Romaine       [facility] (Including outside providers/suppliers regularly involved in providing care):   Patient Care Team:  Meghan Colindres MD as PCP - General (Internal Medicine)  Meghan Colindres MD as PCP - Empaneled Provider     Recommendations for Preventive Services Due: see orders and patient instructions/AVS.  Recommended screening schedule for the next 5-10 years is provided to the patient in written form: see Patient Instructions/AVS.     Reviewed and updated this visit:  Allergies  Meds  Problems  Sexual Hx

## 2025-04-03 ENCOUNTER — TRANSCRIBE ORDERS (OUTPATIENT)
Dept: ADMINISTRATIVE | Facility: HOSPITAL | Age: 75
End: 2025-04-03
Payer: MEDICARE

## 2025-04-03 DIAGNOSIS — Z87.891 PERSONAL HISTORY OF TOBACCO USE, PRESENTING HAZARDS TO HEALTH: Primary | ICD-10-CM

## 2025-04-09 ENCOUNTER — RESULTS FOLLOW-UP (OUTPATIENT)
Dept: INTERNAL MEDICINE | Age: 75
End: 2025-04-09

## 2025-04-09 RX ORDER — ATORVASTATIN CALCIUM 40 MG/1
40 TABLET, FILM COATED ORAL DAILY
Qty: 90 TABLET | Refills: 3 | Status: SHIPPED | OUTPATIENT
Start: 2025-04-09

## 2025-04-14 ASSESSMENT — ENCOUNTER SYMPTOMS
BACK PAIN: 1
ABDOMINAL DISTENTION: 0
EYE REDNESS: 0
COUGH: 1
SINUS PRESSURE: 0
EYE DISCHARGE: 0
SHORTNESS OF BREATH: 1
ABDOMINAL PAIN: 0

## 2025-04-16 ENCOUNTER — TELEPHONE (OUTPATIENT)
Dept: INTERNAL MEDICINE | Age: 75
End: 2025-04-16

## 2025-04-16 NOTE — TELEPHONE ENCOUNTER
The 10mg Potassium tabs are powdery and choke her most of the time.  Can a different kind be sent in?  Or can she just stop the potassium?

## 2025-05-01 ENCOUNTER — HOSPITAL ENCOUNTER (OUTPATIENT)
Dept: CT IMAGING | Facility: HOSPITAL | Age: 75
Discharge: HOME | End: 2025-05-01
Admitting: INTERNAL MEDICINE
Payer: MEDICARE

## 2025-05-01 DIAGNOSIS — Z87.891 PERSONAL HISTORY OF TOBACCO USE, PRESENTING HAZARDS TO HEALTH: ICD-10-CM

## 2025-05-01 PROCEDURE — 71271 CT THORAX LUNG CANCER SCR C-: CPT

## 2025-05-02 ENCOUNTER — RESULTS FOLLOW-UP (OUTPATIENT)
Dept: INTERNAL MEDICINE | Age: 75
End: 2025-05-02

## 2025-05-19 ENCOUNTER — OFFICE VISIT (OUTPATIENT)
Dept: INTERNAL MEDICINE | Age: 75
End: 2025-05-19
Payer: MEDICARE

## 2025-05-19 VITALS
HEIGHT: 59 IN | SYSTOLIC BLOOD PRESSURE: 138 MMHG | DIASTOLIC BLOOD PRESSURE: 70 MMHG | WEIGHT: 85 LBS | OXYGEN SATURATION: 97 % | HEART RATE: 86 BPM | BODY MASS INDEX: 17.14 KG/M2

## 2025-05-19 DIAGNOSIS — R55 PRE-SYNCOPE: ICD-10-CM

## 2025-05-19 DIAGNOSIS — D64.9 ANEMIA, UNSPECIFIED TYPE: ICD-10-CM

## 2025-05-19 DIAGNOSIS — R55 PRE-SYNCOPE: Primary | ICD-10-CM

## 2025-05-19 DIAGNOSIS — R63.4 WEIGHT LOSS: ICD-10-CM

## 2025-05-19 LAB
ALBUMIN SERPL-MCNC: 4.4 G/DL (ref 3.5–5.2)
ALP SERPL-CCNC: 87 U/L (ref 35–104)
ALT SERPL-CCNC: 17 U/L (ref 10–35)
ANION GAP SERPL CALCULATED.3IONS-SCNC: 11 MMOL/L (ref 8–16)
AST SERPL-CCNC: 35 U/L (ref 10–35)
BILIRUB SERPL-MCNC: 0.3 MG/DL (ref 0.2–1.2)
BILIRUB UR QL STRIP: NEGATIVE
BUN SERPL-MCNC: 9 MG/DL (ref 8–23)
CALCIUM SERPL-MCNC: 9.8 MG/DL (ref 8.8–10.2)
CHLORIDE SERPL-SCNC: 100 MMOL/L (ref 98–107)
CLARITY UR: CLEAR
CO2 SERPL-SCNC: 26 MMOL/L (ref 22–29)
COLOR UR: YELLOW
CREAT SERPL-MCNC: 0.7 MG/DL (ref 0.5–0.9)
ERYTHROCYTE [DISTWIDTH] IN BLOOD BY AUTOMATED COUNT: 13.6 % (ref 11.5–14.5)
FERRITIN SERPL-MCNC: 118 NG/ML (ref 13–150)
FOLATE SERPL-MCNC: 9.7 NG/ML (ref 4.8–37.3)
GLUCOSE SERPL-MCNC: 122 MG/DL (ref 70–99)
GLUCOSE UR STRIP.AUTO-MCNC: NEGATIVE MG/DL
HCT VFR BLD AUTO: 34.2 % (ref 37–47)
HGB BLD-MCNC: 10.8 G/DL (ref 12–16)
HGB UR STRIP.AUTO-MCNC: NEGATIVE MG/L
IRON SATN MFR SERPL: 21 % (ref 15–50)
IRON SERPL-MCNC: 55 UG/DL (ref 37–145)
KETONES UR STRIP.AUTO-MCNC: NEGATIVE MG/DL
LEUKOCYTE ESTERASE UR QL STRIP.AUTO: NEGATIVE
MCH RBC QN AUTO: 29.7 PG (ref 27–31)
MCHC RBC AUTO-ENTMCNC: 31.6 G/DL (ref 33–37)
MCV RBC AUTO: 94 FL (ref 81–99)
NITRITE UR QL STRIP.AUTO: NEGATIVE
PH UR STRIP.AUTO: 6.5 [PH] (ref 5–8)
PLATELET # BLD AUTO: 400 K/UL (ref 130–400)
PMV BLD AUTO: 10.3 FL (ref 9.4–12.3)
POTASSIUM SERPL-SCNC: 4.5 MMOL/L (ref 3.5–5.1)
PROT SERPL-MCNC: 7.4 G/DL (ref 6.4–8.3)
PROT UR STRIP.AUTO-MCNC: NEGATIVE MG/DL
RBC # BLD AUTO: 3.64 M/UL (ref 4.2–5.4)
SODIUM SERPL-SCNC: 137 MMOL/L (ref 136–145)
SP GR UR STRIP.AUTO: 1 (ref 1–1.03)
TIBC SERPL-MCNC: 265 UG/DL (ref 250–400)
TSH SERPL DL<=0.005 MIU/L-ACNC: 2.7 UIU/ML (ref 0.27–4.2)
UROBILINOGEN UR STRIP.AUTO-MCNC: 0.2 E.U./DL
VIT B12 SERPL-MCNC: 623 PG/ML (ref 232–1245)
WBC # BLD AUTO: 11 K/UL (ref 4.8–10.8)

## 2025-05-19 PROCEDURE — 99214 OFFICE O/P EST MOD 30 MIN: CPT | Performed by: INTERNAL MEDICINE

## 2025-05-19 PROCEDURE — 93000 ELECTROCARDIOGRAM COMPLETE: CPT | Performed by: INTERNAL MEDICINE

## 2025-05-19 PROCEDURE — 1159F MED LIST DOCD IN RCRD: CPT | Performed by: INTERNAL MEDICINE

## 2025-05-19 PROCEDURE — 1123F ACP DISCUSS/DSCN MKR DOCD: CPT | Performed by: INTERNAL MEDICINE
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
## 2025-05-20 ENCOUNTER — RESULTS FOLLOW-UP (OUTPATIENT)
Dept: INTERNAL MEDICINE | Age: 75
End: 2025-05-20

## 2025-06-04 DIAGNOSIS — K21.00 GASTROESOPHAGEAL REFLUX DISEASE WITH ESOPHAGITIS, UNSPECIFIED WHETHER HEMORRHAGE: ICD-10-CM

## 2025-06-04 DIAGNOSIS — E03.9 ACQUIRED HYPOTHYROIDISM: ICD-10-CM

## 2025-06-04 DIAGNOSIS — F34.1 DYSTHYMIA: ICD-10-CM

## 2025-06-04 DIAGNOSIS — R63.4 WEIGHT LOSS: ICD-10-CM

## 2025-06-04 RX ORDER — MIRTAZAPINE 7.5 MG/1
TABLET, FILM COATED ORAL
Qty: 90 TABLET | Refills: 3 | Status: SHIPPED | OUTPATIENT
Start: 2025-06-04

## 2025-06-04 RX ORDER — PANTOPRAZOLE SODIUM 40 MG/1
TABLET, DELAYED RELEASE ORAL
Qty: 90 TABLET | Refills: 3 | Status: SHIPPED | OUTPATIENT
Start: 2025-06-04

## 2025-06-04 RX ORDER — LEVOTHYROXINE SODIUM 25 UG/1
TABLET ORAL
Qty: 90 TABLET | Refills: 3 | Status: SHIPPED | OUTPATIENT
Start: 2025-06-04

## 2025-07-01 ENCOUNTER — OFFICE VISIT (OUTPATIENT)
Dept: INTERNAL MEDICINE | Age: 75
End: 2025-07-01
Payer: MEDICARE

## 2025-07-01 VITALS
DIASTOLIC BLOOD PRESSURE: 80 MMHG | OXYGEN SATURATION: 94 % | SYSTOLIC BLOOD PRESSURE: 148 MMHG | HEART RATE: 84 BPM | BODY MASS INDEX: 17.14 KG/M2 | WEIGHT: 85 LBS | HEIGHT: 59 IN

## 2025-07-01 DIAGNOSIS — E03.9 ACQUIRED HYPOTHYROIDISM: ICD-10-CM

## 2025-07-01 DIAGNOSIS — K21.9 GASTROESOPHAGEAL REFLUX DISEASE WITHOUT ESOPHAGITIS: ICD-10-CM

## 2025-07-01 DIAGNOSIS — J47.9 BRONCHIECTASIS WITHOUT COMPLICATION (HCC): ICD-10-CM

## 2025-07-01 DIAGNOSIS — F32.9 REACTIVE DEPRESSION: ICD-10-CM

## 2025-07-01 DIAGNOSIS — E78.00 PURE HYPERCHOLESTEROLEMIA: ICD-10-CM

## 2025-07-01 DIAGNOSIS — I10 PRIMARY HYPERTENSION: Primary | ICD-10-CM

## 2025-07-01 DIAGNOSIS — J30.89 NON-SEASONAL ALLERGIC RHINITIS, UNSPECIFIED TRIGGER: ICD-10-CM

## 2025-07-01 PROCEDURE — 3079F DIAST BP 80-89 MM HG: CPT | Performed by: INTERNAL MEDICINE

## 2025-07-01 PROCEDURE — 99214 OFFICE O/P EST MOD 30 MIN: CPT | Performed by: INTERNAL MEDICINE

## 2025-07-01 PROCEDURE — 1123F ACP DISCUSS/DSCN MKR DOCD: CPT | Performed by: INTERNAL MEDICINE

## 2025-07-01 PROCEDURE — 3077F SYST BP >= 140 MM HG: CPT | Performed by: INTERNAL MEDICINE

## 2025-07-01 PROCEDURE — 1159F MED LIST DOCD IN RCRD: CPT | Performed by: INTERNAL MEDICINE

## 2025-07-01 RX ORDER — HYDROCHLOROTHIAZIDE 12.5 MG/1
12.5 CAPSULE ORAL EVERY MORNING
Qty: 90 CAPSULE | Refills: 1 | Status: SHIPPED | OUTPATIENT
Start: 2025-07-01

## 2025-07-01 RX ORDER — BUDESONIDE, GLYCOPYRROLATE, AND FORMOTEROL FUMARATE 160; 9; 4.8 UG/1; UG/1; UG/1
2 AEROSOL, METERED RESPIRATORY (INHALATION) 2 TIMES DAILY
Qty: 1 EACH | Refills: 3 | Status: CANCELLED | OUTPATIENT
Start: 2025-07-01

## 2025-07-01 RX ORDER — FLUTICASONE FUROATE, UMECLIDINIUM BROMIDE AND VILANTEROL TRIFENATATE 200; 62.5; 25 UG/1; UG/1; UG/1
1 POWDER RESPIRATORY (INHALATION) DAILY
Qty: 1 EACH | Refills: 3 | Status: CANCELLED | OUTPATIENT
Start: 2025-07-01
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
## 2025-07-16 ENCOUNTER — HOSPITAL ENCOUNTER (INPATIENT)
Facility: HOSPITAL | Age: 75
LOS: 4 days | Discharge: HOME | DRG: 640 | End: 2025-07-20
Attending: EMERGENCY MEDICINE | Admitting: HOSPITALIST
Payer: MEDICARE

## 2025-07-16 ENCOUNTER — APPOINTMENT (OUTPATIENT)
Dept: CT IMAGING | Facility: HOSPITAL | Age: 75
DRG: 640 | End: 2025-07-16
Payer: MEDICARE

## 2025-07-16 ENCOUNTER — APPOINTMENT (OUTPATIENT)
Dept: GENERAL RADIOLOGY | Facility: HOSPITAL | Age: 75
DRG: 640 | End: 2025-07-16
Payer: MEDICARE

## 2025-07-16 DIAGNOSIS — E87.1 HYPONATREMIA: ICD-10-CM

## 2025-07-16 DIAGNOSIS — Z74.09 IMPAIRED MOBILITY: ICD-10-CM

## 2025-07-16 DIAGNOSIS — E87.8 HYPOCHLOREMIA: ICD-10-CM

## 2025-07-16 DIAGNOSIS — R11.2 NAUSEA AND VOMITING, UNSPECIFIED VOMITING TYPE: Primary | ICD-10-CM

## 2025-07-16 DIAGNOSIS — K52.9 COLITIS: ICD-10-CM

## 2025-07-16 DIAGNOSIS — E87.6 HYPOKALEMIA: ICD-10-CM

## 2025-07-16 LAB
ALBUMIN SERPL-MCNC: 4.7 G/DL (ref 3.5–5.2)
ALBUMIN/GLOB SERPL: 1.4 G/DL
ALP SERPL-CCNC: 64 U/L (ref 39–117)
ALT SERPL W P-5'-P-CCNC: 10 U/L (ref 1–33)
ANION GAP SERPL CALCULATED.3IONS-SCNC: 16 MMOL/L (ref 5–15)
AST SERPL-CCNC: 30 U/L (ref 1–32)
BASOPHILS # BLD AUTO: 0.03 10*3/MM3 (ref 0–0.2)
BASOPHILS NFR BLD AUTO: 0.4 % (ref 0–1.5)
BILIRUB SERPL-MCNC: 1 MG/DL (ref 0–1.2)
BILIRUB UR QL STRIP: NEGATIVE
BUN SERPL-MCNC: 10 MG/DL (ref 8–23)
BUN/CREAT SERPL: 13.5 (ref 7–25)
CALCIUM SPEC-SCNC: 9.5 MG/DL (ref 8.6–10.5)
CHLORIDE SERPL-SCNC: 73 MMOL/L (ref 98–107)
CLARITY UR: CLEAR
CO2 SERPL-SCNC: 22 MMOL/L (ref 22–29)
COLOR UR: YELLOW
CREAT SERPL-MCNC: 0.74 MG/DL (ref 0.57–1)
DEPRECATED RDW RBC AUTO: 36.8 FL (ref 37–54)
EGFRCR SERPLBLD CKD-EPI 2021: 84 ML/MIN/1.73
EOSINOPHIL # BLD AUTO: 0.04 10*3/MM3 (ref 0–0.4)
EOSINOPHIL NFR BLD AUTO: 0.6 % (ref 0.3–6.2)
ERYTHROCYTE [DISTWIDTH] IN BLOOD BY AUTOMATED COUNT: 12.5 % (ref 12.3–15.4)
GLOBULIN UR ELPH-MCNC: 3.3 GM/DL
GLUCOSE SERPL-MCNC: 120 MG/DL (ref 65–99)
GLUCOSE UR STRIP-MCNC: NEGATIVE MG/DL
HCT VFR BLD AUTO: 32.6 % (ref 34–46.6)
HGB BLD-MCNC: 11.6 G/DL (ref 12–15.9)
HGB UR QL STRIP.AUTO: NEGATIVE
HOLD SPECIMEN: NORMAL
IMM GRANULOCYTES # BLD AUTO: 0.02 10*3/MM3 (ref 0–0.05)
IMM GRANULOCYTES NFR BLD AUTO: 0.3 % (ref 0–0.5)
KETONES UR QL STRIP: ABNORMAL
LEUKOCYTE ESTERASE UR QL STRIP.AUTO: NEGATIVE
LIPASE SERPL-CCNC: 68 U/L (ref 13–60)
LYMPHOCYTES # BLD AUTO: 2.37 10*3/MM3 (ref 0.7–3.1)
LYMPHOCYTES NFR BLD AUTO: 34.6 % (ref 19.6–45.3)
MCH RBC QN AUTO: 28.8 PG (ref 26.6–33)
MCHC RBC AUTO-ENTMCNC: 35.6 G/DL (ref 31.5–35.7)
MCV RBC AUTO: 80.9 FL (ref 79–97)
MONOCYTES # BLD AUTO: 0.76 10*3/MM3 (ref 0.1–0.9)
MONOCYTES NFR BLD AUTO: 11.1 % (ref 5–12)
NEUTROPHILS NFR BLD AUTO: 3.63 10*3/MM3 (ref 1.7–7)
NEUTROPHILS NFR BLD AUTO: 53 % (ref 42.7–76)
NITRITE UR QL STRIP: NEGATIVE
NRBC BLD AUTO-RTO: 0 /100 WBC (ref 0–0.2)
OSMOLALITY SERPL: 237 MOSM/KG (ref 280–301)
OSMOLALITY UR: 272 MOSM/KG (ref 50–1400)
PH UR STRIP.AUTO: 6 [PH] (ref 5–8)
PLATELET # BLD AUTO: 371 10*3/MM3 (ref 140–450)
PMV BLD AUTO: 8.9 FL (ref 6–12)
POTASSIUM SERPL-SCNC: 3.2 MMOL/L (ref 3.5–5.2)
POTASSIUM SERPL-SCNC: 4.3 MMOL/L (ref 3.5–5.2)
PROT SERPL-MCNC: 8 G/DL (ref 6–8.5)
PROT UR QL STRIP: NEGATIVE
RBC # BLD AUTO: 4.03 10*6/MM3 (ref 3.77–5.28)
SODIUM SERPL-SCNC: 111 MMOL/L (ref 136–145)
SODIUM SERPL-SCNC: 118 MMOL/L (ref 136–145)
SODIUM SERPL-SCNC: 121 MMOL/L (ref 136–145)
SODIUM SERPL-SCNC: 122 MMOL/L (ref 136–145)
SODIUM UR-SCNC: 50 MMOL/L
SP GR UR STRIP: 1.02 (ref 1–1.03)
UROBILINOGEN UR QL STRIP: ABNORMAL
WBC NRBC COR # BLD AUTO: 6.85 10*3/MM3 (ref 3.4–10.8)
WHOLE BLOOD HOLD COAG: NORMAL
WHOLE BLOOD HOLD SPECIMEN: NORMAL

## 2025-07-16 PROCEDURE — 84295 ASSAY OF SERUM SODIUM: CPT | Performed by: INTERNAL MEDICINE

## 2025-07-16 PROCEDURE — 83935 ASSAY OF URINE OSMOLALITY: CPT | Performed by: NURSE PRACTITIONER

## 2025-07-16 PROCEDURE — 25010000002 ONDANSETRON PER 1 MG: Performed by: EMERGENCY MEDICINE

## 2025-07-16 PROCEDURE — 93005 ELECTROCARDIOGRAM TRACING: CPT | Performed by: EMERGENCY MEDICINE

## 2025-07-16 PROCEDURE — 84132 ASSAY OF SERUM POTASSIUM: CPT | Performed by: INTERNAL MEDICINE

## 2025-07-16 PROCEDURE — 25810000003 SODIUM CHLORIDE 0.9 % SOLUTION: Performed by: NURSE PRACTITIONER

## 2025-07-16 PROCEDURE — G0378 HOSPITAL OBSERVATION PER HR: HCPCS

## 2025-07-16 PROCEDURE — 81003 URINALYSIS AUTO W/O SCOPE: CPT | Performed by: EMERGENCY MEDICINE

## 2025-07-16 PROCEDURE — 83930 ASSAY OF BLOOD OSMOLALITY: CPT | Performed by: NURSE PRACTITIONER

## 2025-07-16 PROCEDURE — 25510000001 IOPAMIDOL 61 % SOLUTION: Performed by: EMERGENCY MEDICINE

## 2025-07-16 PROCEDURE — 80053 COMPREHEN METABOLIC PANEL: CPT | Performed by: EMERGENCY MEDICINE

## 2025-07-16 PROCEDURE — 85025 COMPLETE CBC W/AUTO DIFF WBC: CPT | Performed by: EMERGENCY MEDICINE

## 2025-07-16 PROCEDURE — 74177 CT ABD & PELVIS W/CONTRAST: CPT

## 2025-07-16 PROCEDURE — 25010000003 DEXTROSE 5 % SOLUTION: Performed by: NURSE PRACTITIONER

## 2025-07-16 PROCEDURE — 93010 ELECTROCARDIOGRAM REPORT: CPT | Performed by: EMERGENCY MEDICINE

## 2025-07-16 PROCEDURE — 99285 EMERGENCY DEPT VISIT HI MDM: CPT | Performed by: EMERGENCY MEDICINE

## 2025-07-16 PROCEDURE — 83690 ASSAY OF LIPASE: CPT | Performed by: EMERGENCY MEDICINE

## 2025-07-16 PROCEDURE — 71045 X-RAY EXAM CHEST 1 VIEW: CPT

## 2025-07-16 PROCEDURE — 25010000002 POTASSIUM CHLORIDE 10 MEQ/100ML SOLUTION: Performed by: EMERGENCY MEDICINE

## 2025-07-16 PROCEDURE — 84295 ASSAY OF SERUM SODIUM: CPT | Performed by: NURSE PRACTITIONER

## 2025-07-16 PROCEDURE — 25010000002 ENOXAPARIN PER 10 MG: Performed by: NURSE PRACTITIONER

## 2025-07-16 PROCEDURE — 25010000002 CEFTRIAXONE PER 250 MG: Performed by: EMERGENCY MEDICINE

## 2025-07-16 PROCEDURE — 84300 ASSAY OF URINE SODIUM: CPT | Performed by: NURSE PRACTITIONER

## 2025-07-16 PROCEDURE — 36415 COLL VENOUS BLD VENIPUNCTURE: CPT

## 2025-07-16 PROCEDURE — 25810000003 SODIUM CHLORIDE 0.9 % SOLUTION: Performed by: EMERGENCY MEDICINE

## 2025-07-16 RX ORDER — POTASSIUM CHLORIDE 7.45 MG/ML
10 INJECTION INTRAVENOUS ONCE
Status: COMPLETED | OUTPATIENT
Start: 2025-07-16 | End: 2025-07-16

## 2025-07-16 RX ORDER — SODIUM CHLORIDE 3 G/100ML
100 INJECTION, SOLUTION INTRAVENOUS ONCE
Status: DISCONTINUED | OUTPATIENT
Start: 2025-07-16 | End: 2025-07-16

## 2025-07-16 RX ORDER — ENOXAPARIN SODIUM 100 MG/ML
30 INJECTION SUBCUTANEOUS EVERY 24 HOURS
Status: DISCONTINUED | OUTPATIENT
Start: 2025-07-16 | End: 2025-07-20 | Stop reason: HOSPADM

## 2025-07-16 RX ORDER — BISACODYL 5 MG/1
5 TABLET, DELAYED RELEASE ORAL DAILY PRN
Status: DISCONTINUED | OUTPATIENT
Start: 2025-07-16 | End: 2025-07-20 | Stop reason: HOSPADM

## 2025-07-16 RX ORDER — BUDESONIDE, GLYCOPYRROLATE, AND FORMOTEROL FUMARATE 160; 9; 4.8 UG/1; UG/1; UG/1
2 AEROSOL, METERED RESPIRATORY (INHALATION) 2 TIMES DAILY
COMMUNITY

## 2025-07-16 RX ORDER — POTASSIUM CHLORIDE 7.45 MG/ML
10 INJECTION INTRAVENOUS ONCE
Status: DISCONTINUED | OUTPATIENT
Start: 2025-07-16 | End: 2025-07-16

## 2025-07-16 RX ORDER — CHLORHEXIDINE GLUCONATE 500 MG/1
1 CLOTH TOPICAL ONCE
Status: COMPLETED | OUTPATIENT
Start: 2025-07-16 | End: 2025-07-16

## 2025-07-16 RX ORDER — SODIUM CHLORIDE 0.9 % (FLUSH) 0.9 %
10 SYRINGE (ML) INJECTION AS NEEDED
Status: DISCONTINUED | OUTPATIENT
Start: 2025-07-16 | End: 2025-07-20 | Stop reason: HOSPADM

## 2025-07-16 RX ORDER — BISACODYL 10 MG
10 SUPPOSITORY, RECTAL RECTAL DAILY PRN
Status: DISCONTINUED | OUTPATIENT
Start: 2025-07-16 | End: 2025-07-20 | Stop reason: HOSPADM

## 2025-07-16 RX ORDER — HYDROCHLOROTHIAZIDE 12.5 MG/1
12.5 TABLET ORAL DAILY
COMMUNITY
End: 2025-07-20 | Stop reason: HOSPADM

## 2025-07-16 RX ORDER — PANTOPRAZOLE SODIUM 40 MG/1
40 TABLET, DELAYED RELEASE ORAL DAILY
Status: DISCONTINUED | OUTPATIENT
Start: 2025-07-16 | End: 2025-07-20 | Stop reason: HOSPADM

## 2025-07-16 RX ORDER — SODIUM CHLORIDE 0.9 % (FLUSH) 0.9 %
10 SYRINGE (ML) INJECTION EVERY 12 HOURS SCHEDULED
Status: DISCONTINUED | OUTPATIENT
Start: 2025-07-16 | End: 2025-07-20 | Stop reason: HOSPADM

## 2025-07-16 RX ORDER — ATORVASTATIN CALCIUM 10 MG/1
10 TABLET, FILM COATED ORAL NIGHTLY
Status: DISCONTINUED | OUTPATIENT
Start: 2025-07-16 | End: 2025-07-20 | Stop reason: HOSPADM

## 2025-07-16 RX ORDER — CHLORHEXIDINE GLUCONATE 500 MG/1
1 CLOTH TOPICAL EVERY 24 HOURS
Status: DISCONTINUED | OUTPATIENT
Start: 2025-07-17 | End: 2025-07-20

## 2025-07-16 RX ORDER — SODIUM CHLORIDE 9 MG/ML
40 INJECTION, SOLUTION INTRAVENOUS AS NEEDED
Status: DISCONTINUED | OUTPATIENT
Start: 2025-07-16 | End: 2025-07-20 | Stop reason: HOSPADM

## 2025-07-16 RX ORDER — ASPIRIN 81 MG/1
81 TABLET ORAL DAILY
Status: DISCONTINUED | OUTPATIENT
Start: 2025-07-16 | End: 2025-07-20 | Stop reason: HOSPADM

## 2025-07-16 RX ORDER — DEXTROSE MONOHYDRATE 50 MG/ML
6 INJECTION, SOLUTION INTRAVENOUS CONTINUOUS PRN
Status: DISCONTINUED | OUTPATIENT
Start: 2025-07-16 | End: 2025-07-17

## 2025-07-16 RX ORDER — ONDANSETRON 2 MG/ML
4 INJECTION INTRAMUSCULAR; INTRAVENOUS ONCE
Status: COMPLETED | OUTPATIENT
Start: 2025-07-16 | End: 2025-07-16

## 2025-07-16 RX ORDER — MONTELUKAST SODIUM 10 MG/1
10 TABLET ORAL NIGHTLY
COMMUNITY

## 2025-07-16 RX ORDER — NITROGLYCERIN 0.4 MG/1
0.4 TABLET SUBLINGUAL
Status: DISCONTINUED | OUTPATIENT
Start: 2025-07-16 | End: 2025-07-20 | Stop reason: HOSPADM

## 2025-07-16 RX ORDER — AMOXICILLIN 250 MG
2 CAPSULE ORAL 2 TIMES DAILY
Status: DISCONTINUED | OUTPATIENT
Start: 2025-07-16 | End: 2025-07-20 | Stop reason: HOSPADM

## 2025-07-16 RX ORDER — POLYETHYLENE GLYCOL 3350 17 G/17G
17 POWDER, FOR SOLUTION ORAL DAILY PRN
Status: DISCONTINUED | OUTPATIENT
Start: 2025-07-16 | End: 2025-07-20 | Stop reason: HOSPADM

## 2025-07-16 RX ORDER — POTASSIUM CHLORIDE 1500 MG/1
20 TABLET, EXTENDED RELEASE ORAL ONCE
Status: COMPLETED | OUTPATIENT
Start: 2025-07-16 | End: 2025-07-16

## 2025-07-16 RX ORDER — IOPAMIDOL 612 MG/ML
100 INJECTION, SOLUTION INTRAVASCULAR
Status: COMPLETED | OUTPATIENT
Start: 2025-07-16 | End: 2025-07-16

## 2025-07-16 RX ORDER — LEVOTHYROXINE SODIUM 25 UG/1
25 TABLET ORAL
Status: DISCONTINUED | OUTPATIENT
Start: 2025-07-17 | End: 2025-07-16

## 2025-07-16 RX ADMIN — POTASSIUM CHLORIDE 10 MEQ: 7.46 INJECTION, SOLUTION INTRAVENOUS at 11:44

## 2025-07-16 RX ADMIN — PANTOPRAZOLE SODIUM 40 MG: 40 TABLET, DELAYED RELEASE ORAL at 15:28

## 2025-07-16 RX ADMIN — ONDANSETRON 4 MG: 2 INJECTION, SOLUTION INTRAMUSCULAR; INTRAVENOUS at 12:09

## 2025-07-16 RX ADMIN — POTASSIUM CHLORIDE 10 MEQ: 7.46 INJECTION, SOLUTION INTRAVENOUS at 14:22

## 2025-07-16 RX ADMIN — SODIUM CHLORIDE 1000 ML: 9 INJECTION, SOLUTION INTRAVENOUS at 13:43

## 2025-07-16 RX ADMIN — ONDANSETRON 4 MG: 2 INJECTION, SOLUTION INTRAMUSCULAR; INTRAVENOUS at 11:24

## 2025-07-16 RX ADMIN — POTASSIUM CHLORIDE 20 MEQ: 1500 TABLET, EXTENDED RELEASE ORAL at 16:46

## 2025-07-16 RX ADMIN — Medication 1 APPLICATION: at 15:28

## 2025-07-16 RX ADMIN — CEFTRIAXONE SODIUM 1000 MG: 1 INJECTION, POWDER, FOR SOLUTION INTRAMUSCULAR; INTRAVENOUS at 12:20

## 2025-07-16 RX ADMIN — POTASSIUM CHLORIDE 10 MEQ: 7.46 INJECTION, SOLUTION INTRAVENOUS at 13:10

## 2025-07-16 RX ADMIN — ENOXAPARIN SODIUM 30 MG: 100 INJECTION SUBCUTANEOUS at 15:28

## 2025-07-16 RX ADMIN — IOPAMIDOL 100 ML: 612 INJECTION, SOLUTION INTRAVENOUS at 11:23

## 2025-07-16 RX ADMIN — SERTRALINE HYDROCHLORIDE 50 MG: 100 TABLET, FILM COATED ORAL at 21:21

## 2025-07-16 RX ADMIN — ATORVASTATIN CALCIUM 10 MG: 10 TABLET, FILM COATED ORAL at 21:22

## 2025-07-16 RX ADMIN — SODIUM CHLORIDE 500 ML: 9 INJECTION, SOLUTION INTRAVENOUS at 11:24

## 2025-07-16 RX ADMIN — Medication 10 ML: at 13:44

## 2025-07-16 RX ADMIN — ASPIRIN 81 MG: 81 TABLET, COATED ORAL at 15:28

## 2025-07-16 RX ADMIN — DEXTROSE MONOHYDRATE 227.4 ML: 50 INJECTION, SOLUTION INTRAVENOUS at 21:20

## 2025-07-16 RX ADMIN — DOCUSATE SODIUM 50 MG AND SENNOSIDES 8.6 MG 2 TABLET: 8.6; 5 TABLET, FILM COATED ORAL at 21:22

## 2025-07-16 RX ADMIN — CHLORHEXIDINE GLUCONATE 1 APPLICATION: 500 CLOTH TOPICAL at 13:45

## 2025-07-16 RX ADMIN — Medication 1 APPLICATION: at 21:22

## 2025-07-16 RX ADMIN — Medication 10 ML: at 21:22

## 2025-07-16 NOTE — H&P
[facility] Intensivist Services    Date of Admission: 7/16/2025  Date of Note: 07/16/25  Primary Care Physician: Kaylan Kowalski MD    History     74 y.o. female with past medical history of primary HTN, hypothyroidism, HLD, bronchiectasis, depression and GERD, presented to the ED today with severe weakness after 4 days of N&V. Pt was in usual state of health when she became ill with N&V for past 4 days. Has had little to no oral intake. Family believed she was severely dehydrated and deconditioned as she does not eat or drink very much at her baseline. Upon arrival in the ED, pt was found to be severely hyponatremic with a Na+ level of 111. Has never had issues in the past with hyponatremia or N&V. Of note, pt did just start HCTZ as outpatient for her HTN. CT abd/pelvis with contrast indicated areas of thickening in ascending and descending colon, possible colitis vs underdistention. No other signs of infectious process. She did receive a dose of Rocephin in the ED and her K+ was replaced. The intensivist service was contacted by the ED physician to evaluate the patient for admission to critical care. Upon exam in ED, pt alert, no distress. Very hard of hearing and did not have hearing aids in at present. Family member at bedside able to relay information to her. BP and HR stable, no pressors. No oxygenation issues on RA. Pt will be admitted to Critical Care Medicine service for further treatment and investigation into her hyponatremia.     Past Medical History     Active and Resolved Problems  Active Hospital Problems    Diagnosis  POA    **Hyponatremia [E87.1]  Yes      Resolved Hospital Problems   No resolved problems to display.       Past Medical History:   Past Medical History:   Diagnosis Date    Bronchiectasis without complication 4/17/2023    GERD without esophagitis 4/17/2023    Hiatal hernia 4/17/2023    History of environmental allergies 4/17/2023    Multiple lung  nodules on CT 4/17/2023    Personal history of nicotine dependence 4/17/2023       Prior Surgeries: She  has a past surgical history that includes Hysterectomy; Oophorectomy; Excision / biopsy breast / nipple / duct (Left, 2015); Colonoscopy (03/10/2014); and Esophagogastroduodenoscopy (01/15/2013).    Past Surgical History:   Past Surgical History:   Procedure Laterality Date    COLONOSCOPY  03/10/2014    Tics otherwise normal exam repeat in 5 years    ENDOSCOPY  01/15/2013    Moderate chronic active inflammation negative for Intestinal Metaplasia    EXCISION / BIOPSY BREAST / NIPPLE / DUCT Left 2015    benign    HYSTERECTOMY      OOPHORECTOMY         Social and Family History     Family History:  family history is not on file. She was adopted.    Tobacco/Social History:  reports that she quit smoking about 12 years ago. Her smoking use included cigarettes. She started smoking about 42 years ago. She has a 30 pack-year smoking history. She has been exposed to tobacco smoke. She has never used smokeless tobacco. She reports that she does not drink alcohol.    Allergies     Allergies:   She has no known allergies.    No Known Allergies    Labs     CBC          4/1/2025    14:13 5/19/2025    15:33 7/16/2025    10:37   CBC   WBC 9.6     11     6.85    RBC 3.81     3.64     4.03    Hemoglobin 11.2     10.8     11.6    Hematocrit 34.4     34.2     32.6    MCV 90.3     94     80.9    MCH 29.4     29.7     28.8    MCHC 32.6     31.6     35.6    RDW 13.5     13.6     12.5    Platelets 454     400     371       Details          This result is from an external source.             CMP          4/1/2025    14:13 5/19/2025    15:33 7/16/2025    10:37   CMP   Glucose 116     122     120    BUN 9     9     10.0    Creatinine 0.8     0.7     0.74    EGFR   84.0    Sodium 135     137     111    Potassium 4.5     4.5     3.2    Chloride 98     100     73    Calcium 9.8     9.8     9.5    Total Protein 7.8     7.4     8.0    Albumin  "4.6     4.4     4.7    Globulin   3.3    Total Bilirubin 0.3     0.3     1.0    Alkaline Phosphatase 65     87     64    AST (SGOT) 24     35     30    ALT (SGPT) 10     17     10    Albumin/Globulin Ratio   1.4    BUN/Creatinine Ratio   13.5    Anion Gap 12     11     16.0       Details          This result is from an external source.             Inpatient Medications     Medications: Scheduled Meds:Chlorhexidine Gluconate Cloth, 1 Application, Topical, Once  [START ON 7/17/2025] Chlorhexidine Gluconate Cloth, 1 Application, Topical, Q24H  enoxaparin sodium, 30 mg, Subcutaneous, Q24H  mupirocin, 1 Application, Each Nare, BID  potassium chloride, 10 mEq, Intravenous, Once   And  potassium chloride, 10 mEq, Intravenous, Once   And  potassium chloride, 10 mEq, Intravenous, Once  senna-docusate sodium, 2 tablet, Oral, BID  sodium chloride, 10 mL, Intravenous, Q12H      Continuous Infusions:dextrose, 6 mL/kg      PRN Meds:.  senna-docusate sodium **AND** polyethylene glycol **AND** bisacodyl **AND** bisacodyl    Calcium Replacement - Follow Nurse / BPA Driven Protocol    dextrose    Magnesium Standard Dose Replacement - Follow Nurse / BPA Driven Protocol    nitroglycerin    Phosphorus Replacement - Follow Nurse / BPA Driven Protocol    Potassium Replacement - Follow Nurse / BPA Driven Protocol    [COMPLETED] Insert Peripheral IV **AND** sodium chloride    sodium chloride    sodium chloride    I have reviewed the patient's current medications.   Outpatient Medications     Outpatient Medications:   No outpatient medications have been marked as taking for the 7/16/25 encounter (Hospital Encounter).       Current Antibiotics     This patient does not have an active medication from one of the medication groupers.    Exam     Vitals: Her  height is 149.9 cm (59\") and weight is 37.9 kg (83 lb 8 oz). Her oral temperature is 97.6 °F (36.4 °C). Her blood pressure is 165/66 and her pulse is 71. Her respiration is 16 and oxygen " saturation is 97%.     GENERAL:  Alert, no acute distress.   SKIN:  Warm, dry  EYES:  Pupils equal, round and reactive to light.  EOMs intact.    HEAD:  Normocephalic.  NECK:  Supple   RESP:  Lungs clear to auscultation. Good airflow. Normal respiratory effort.   CARDIAC:  Regular rate and rhythm. Normal S1,S2. No edema  GI:  Soft, nontender  MSK:  Decreased muscle bulk, generalized weakness  NEUROLOGICAL:  No focal neurological deficits. Oriented to self, time, place, situation    Results and Cultures Review     Result Review:  I have personally reviewed the results from the time of this admission to 7/16/2025 13:13 CDT and agree with these findings:  [x]  Laboratory list / accordion  []  Microbiology  [x]  Radiology  [x]  EKG/Telemetry   []  Cardiology/Vascular   []  Pathology  [x]  Old records  []  Other:    XR Chest 1 View  Result Date: 7/16/2025  1. Chronic lung changes. 2. No acute infiltrate.      This report was signed and finalized on 7/16/2025 11:48 AM by Dr. Romeo Agrawal MD.      CT Abdomen Pelvis With Contrast  Result Date: 7/16/2025  1. An area of circumferential wall thickening and narrowing of the ascending colon adjacent and distal to the cecum may represent an inflammatory/infectious process. No obstruction. This may partly be due to incomplete distention. Further follow-up may be obtained. 2. A long segment of circumferential thickening of the wall of the descending colon with mild surrounding peritoneal infiltration may represent acute nonspecific colitis?. This may partly be due to incomplete distention. 3. Diverticulosis of the colon. No finding to suggest diverticulitis. 4. The appendix is not optimally visualized. There are no findings in the expected area of the appendix to suggest appendicitis. 5. Other nonacute findings similar to the previous study.               This report was signed and finalized on 7/16/2025 11:47 AM by Dr. Deisy Bolaños MD.        Assessment/Plan     Active  Hospital Problems    Diagnosis  POA    **Hyponatremia [E87.1]  Yes   73 yo female with Hyponatremia, likely secondary to new medication, HCTZ, in conjunction with volume contraction, and possibly aspect of SIADH from prolonged vomiting, being admitted to the ICU for critical care management.     Hyponatremia  - receiving 500ml normal saline bolus now  - Will repeat fluid bolus with 1L NS as I feel she is volume contracted  - check repeat sodium level after bolus  - check serum osmolality, urine osmo, urine sodium levels  - further sodium and fluid management pending above studies   - goal for slow steady repletion, 4 meq in first 6 hr, 6 meq first 12 hr, 8 meq first 24 hr  - PRN D5W for rapid overcorrection  - q2 hr neurochecks  - Stop HCTZ    N&V, ? Colitis  - colitis vs underdistention on CT abd/pelvis, received one dose of rocephin in ED  - no fever, no leukocytosis, will hold on further antimicrobials at this time  - PRN antiemetics    Chronic medical problems  - hypothyroidism: home dose levothyroxine  - HTN: stopping HCTZ, PRN antihypertensives if SBP>170, may need to change to ACE/ARB for primary HTN  - GERD: home PPI  - Depression: home sertraline    VTE: lovenox  GI: home PPI  NTN: regular diet  Abx: n/a, one dose of rocephin  Lines/Tubes: peripheral IV  CODE STATUS: full resuscitation, will clarify with patient and family once she has hearing aids in     Total critical care time: 45 minutes     Due to a high probability of clinically significant, life threatening deterioration, the patient required my highest level of preparedness to intervene emergently and I personally spent this critical care time directly and personally managing the patient.      This critical care time included obtaining a history; examining the patient; pulse oximetry; ordering and review of studies; arranging urgent treatment with development of a management plan; evaluation of patient's response to treatment; frequent reassessment;  and, discussions with other providers.     This critical care time was performed to assess and manage the high probability of imminent, life-threatening deterioration that could result in multi-organ failure. It was exclusive of separately billable procedures and treating other patients and teaching time.     Please see MDM section and the rest of the note for further information on patient assessment and treatment.     Part of this note may be an electronic transcription/translation of spoken language to printed text using the Dragon dictation system       Electronically signed by TIMUR Mandujano on 7/16/2025 at 13:34 CDT

## 2025-07-16 NOTE — ED PROVIDER NOTES
Subjective   History of Present Illness  Patient is a 74-year-old female with a history of GERD who presents to the ER with nausea and vomiting.  Patient states she has had nausea and vomiting for the last 4 days along with decreased appetite and generalized weakness.  Patient and her son think she is dehydrated.  Patient states she did have some diarrhea a few days ago but none in the last few days.  She denies any fever, chest pain, shortness of air, abdominal pain, urinary changes, neurologic changes.  Patient states she was so weak today that she could not get up to even get anything to eat or drink.  She does live alone.      Review of Systems   Constitutional:  Positive for appetite change.   HENT: Negative.     Eyes: Negative.    Respiratory: Negative.     Cardiovascular: Negative.    Gastrointestinal:  Positive for diarrhea, nausea and vomiting.   Endocrine: Negative.    Genitourinary: Negative.    Musculoskeletal: Negative.    Skin: Negative.    Allergic/Immunologic: Negative.    Neurological:  Positive for weakness.   Hematological: Negative.    Psychiatric/Behavioral: Negative.     All other systems reviewed and are negative.      Past Medical History:   Diagnosis Date    Bronchiectasis without complication 4/17/2023    GERD without esophagitis 4/17/2023    Hiatal hernia 4/17/2023    History of environmental allergies 4/17/2023    Multiple lung nodules on CT 4/17/2023    Personal history of nicotine dependence 4/17/2023       No Known Allergies    Past Surgical History:   Procedure Laterality Date    COLONOSCOPY  03/10/2014    Tics otherwise normal exam repeat in 5 years    ENDOSCOPY  01/15/2013    Moderate chronic active inflammation negative for Intestinal Metaplasia    EXCISION / BIOPSY BREAST / NIPPLE / DUCT Left 2015    benign    HYSTERECTOMY      OOPHORECTOMY         Family History   Adopted: Yes       Social History     Socioeconomic History    Marital status:    Tobacco Use    Smoking  status: Former     Current packs/day: 0.00     Average packs/day: 1 pack/day for 30.0 years (30.0 ttl pk-yrs)     Types: Cigarettes     Start date:      Quit date:      Years since quittin.5     Passive exposure: Past    Smokeless tobacco: Never   Substance and Sexual Activity    Alcohol use: Never           Objective   Physical Exam  Vitals and nursing note reviewed.   Constitutional:       Appearance: She is well-developed.   HENT:      Head: Normocephalic and atraumatic.   Eyes:      Conjunctiva/sclera: Conjunctivae normal.      Pupils: Pupils are equal, round, and reactive to light.   Cardiovascular:      Rate and Rhythm: Normal rate and regular rhythm.      Heart sounds: Normal heart sounds.   Pulmonary:      Effort: Pulmonary effort is normal.      Breath sounds: Normal breath sounds.   Abdominal:      Palpations: Abdomen is soft.      Tenderness: There is no abdominal tenderness. There is no right CVA tenderness, left CVA tenderness, guarding or rebound.   Musculoskeletal:         General: No deformity. Normal range of motion.      Cervical back: Normal range of motion.   Skin:     General: Skin is warm.   Neurological:      Mental Status: She is alert and oriented to person, place, and time.   Psychiatric:         Behavior: Behavior normal.         Procedures           ED Course        EKG as interpreted by me: Normal sinus rhythm with rate of 73, no acute ischemia or infarction                                     Lab Results (last 24 hours)       Procedure Component Value Units Date/Time    CBC & Differential [[id number]]  (Abnormal) Collected: 25 1037    Specimen: Blood from Arm, Left Updated: 25 1105    Narrative:      The following orders were created for panel order CBC & Differential.  Procedure                               Abnormality         Status                     ---------                               -----------         ------                     CBC Auto  Differential[[id number]]        Abnormal            Final result                 Please view results for these tests on the individual orders.    Comprehensive Metabolic Panel [[id number]]  (Abnormal) Collected: 07/16/25 1037    Specimen: Blood from Arm, Left Updated: 07/16/25 1121     Glucose 120 mg/dL      BUN 10.0 mg/dL      Creatinine 0.74 mg/dL      Sodium 111 mmol/L      Potassium 3.2 mmol/L      Comment: Slight hemolysis detected by analyzer. Result may be falsely elevated.        Chloride 73 mmol/L      CO2 22.0 mmol/L      Calcium 9.5 mg/dL      Total Protein 8.0 g/dL      Albumin 4.7 g/dL      ALT (SGPT) 10 U/L      AST (SGOT) 30 U/L      Alkaline Phosphatase 64 U/L      Total Bilirubin 1.0 mg/dL      Globulin 3.3 gm/dL      A/G Ratio 1.4 g/dL      BUN/Creatinine Ratio 13.5     Anion Gap 16.0 mmol/L      eGFR 84.0 mL/min/1.73     Narrative:      GFR Categories in Chronic Kidney Disease (CKD)              GFR Category          GFR (mL/min/1.73)    Interpretation  G1                    90 or greater        Normal or high (1)  G2                    60-89                Mild decrease (1)  G3a                   45-59                Mild to moderate decrease  G3b                   30-44                Moderate to severe decrease  G4                    15-29                Severe decrease  G5                    14 or less           Kidney failure    (1)In the absence of evidence of kidney disease, neither GFR category G1 or G2 fulfill the criteria for CKD.    eGFR calculation 2021 CKD-EPI creatinine equation, which does not include race as a factor    Lipase [[id number]]  (Abnormal) Collected: 07/16/25 1037    Specimen: Blood from Arm, Left Updated: 07/16/25 1116     Lipase 68 U/L     CBC Auto Differential [[id number]]  (Abnormal) Collected: 07/16/25 1037    Specimen: Blood from Arm, Left Updated: 07/16/25 1105     WBC 6.85 10*3/mm3      RBC 4.03 10*6/mm3      Hemoglobin 11.6 g/dL      Hematocrit 32.6 %      MCV 80.9  fL      MCH 28.8 pg      MCHC 35.6 g/dL      RDW 12.5 %      RDW-SD 36.8 fl      MPV 8.9 fL      Platelets 371 10*3/mm3      Neutrophil % 53.0 %      Lymphocyte % 34.6 %      Monocyte % 11.1 %      Eosinophil % 0.6 %      Basophil % 0.4 %      Immature Grans % 0.3 %      Neutrophils, Absolute 3.63 10*3/mm3      Lymphocytes, Absolute 2.37 10*3/mm3      Monocytes, Absolute 0.76 10*3/mm3      Eosinophils, Absolute 0.04 10*3/mm3      Basophils, Absolute 0.03 10*3/mm3      Immature Grans, Absolute 0.02 10*3/mm3      nRBC 0.0 /100 WBC     Urinalysis With Culture If Indicated - Urine, Clean Catch [[id number]]  (Abnormal) Collected: 07/16/25 1131    Specimen: Urine, Clean Catch Updated: 07/16/25 1141     Color, UA Yellow     Appearance, UA Clear     pH, UA 6.0     Specific Gravity, UA 1.016     Glucose, UA Negative     Ketones, UA Trace     Bilirubin, UA Negative     Blood, UA Negative     Protein, UA Negative     Leuk Esterase, UA Negative     Nitrite, UA Negative     Urobilinogen, UA 1.0 E.U./dL    Narrative:      In absence of clinical symptoms, the presence of pyuria, bacteria, and/or nitrites on the urinalysis result does not correlate with infection.  Urine microscopic not indicated.           XR Chest 1 View   Final Result   1. Chronic lung changes.   2. No acute infiltrate.                       This report was signed and finalized on 7/16/2025 11:48 AM by Dr. Romeo Agrawal MD.          CT Abdomen Pelvis With Contrast   Final Result   1. An area of circumferential wall thickening and narrowing of the   ascending colon adjacent and distal to the cecum may represent an   inflammatory/infectious process. No obstruction. This may partly be due   to incomplete distention. Further follow-up may be obtained.   2. A long segment of circumferential thickening of the wall of the   descending colon with mild surrounding peritoneal infiltration may   represent acute nonspecific colitis?. This may partly be due to   incomplete  distention.   3. Diverticulosis of the colon. No finding to suggest diverticulitis.   4. The appendix is not optimally visualized. There are no findings in   the expected area of the appendix to suggest appendicitis.   5. Other nonacute findings similar to the previous study.                                                           This report was signed and finalized on 7/16/2025 11:47 AM by Dr. Deisy Bolaños MD.                         Medical Decision Making  Patient is a 74-year-old female with a history of GERD who presents to the ER with nausea and vomiting.  Patient states she has had nausea and vomiting for the last 4 days along with decreased appetite and generalized weakness.  Patient and her son think she is dehydrated.  Patient states she did have some diarrhea a few days ago but none in the last few days.  She denies any fever, chest pain, shortness of air, abdominal pain, urinary changes, neurologic changes.  Patient states she was so weak today that she could not get up to even get anything to eat or drink.  She does live alone.    Differential diagnosis: Dehydration, electrolyte abnormality, abdominal infection, gastroenteritis    Patient was given IV fluids and Zofran.  Labs showed hyponatremia, hypokalemia, hypochloremia, mild anemia and mildly elevated lipase.  Potassium was replaced intravenously.  Chest x-ray showed no acute findings.  CT scan of the abdomen pelvis showed an area of circumferential wall thickening and narrowing of the ascending colon concerning for inflammatory versus infection process.  There was no obstruction.  Patient also had fatty liver and hiatal hernia.  Patient was given Rocephin.  I discussed the case with Dr. Aguilar with the intensivist service and patient was admitted for further workup and treatment.      Problems Addressed:  Colitis: complicated acute illness or injury  Hypochloremia: complicated acute illness or injury  Hypokalemia: complicated acute  illness or injury  Hyponatremia: complicated acute illness or injury  Nausea and vomiting, unspecified vomiting type: complicated acute illness or injury    Amount and/or Complexity of Data Reviewed  Labs: ordered. Decision-making details documented in ED Course.  Radiology: ordered. Decision-making details documented in ED Course.  ECG/medicine tests: ordered. Decision-making details documented in ED Course.  Discussion of management or test interpretation with external provider(s): Dr. Aguilar with the intensivist service    Risk  Prescription drug management.  Decision regarding hospitalization.        Final diagnoses:   Nausea and vomiting, unspecified vomiting type   Hyponatremia   Hypochloremia   Hypokalemia   Colitis       ED Disposition  ED Disposition       ED Disposition   Decision to Admit    Condition   --    Comment   Level of Care: Critical Care [6]   Diagnosis: Hyponatremia [004638]   Admitting Physician: MANJIT AGUILAR [[id number]]   Attending Physician: MANJIT AGUILAR [[id number]]   Certification: I Certify That Inpatient Hospital Services Are Medically Necessary For Greater Than 2 Midnights                 No follow-up provider specified.       Medication List      No changes were made to your prescriptions during this visit.            Dahlia Perera MD  07/16/25 1701

## 2025-07-17 PROBLEM — E43 SEVERE PROTEIN-CALORIE MALNUTRITION: Status: ACTIVE | Noted: 2025-07-17

## 2025-07-17 LAB
ANION GAP SERPL CALCULATED.3IONS-SCNC: 12 MMOL/L (ref 5–15)
BASOPHILS # BLD AUTO: 0.05 10*3/MM3 (ref 0–0.2)
BASOPHILS NFR BLD AUTO: 0.8 % (ref 0–1.5)
BUN SERPL-MCNC: 7.4 MG/DL (ref 8–23)
BUN/CREAT SERPL: 11.4 (ref 7–25)
CALCIUM SPEC-SCNC: 8.7 MG/DL (ref 8.6–10.5)
CHLORIDE SERPL-SCNC: 87 MMOL/L (ref 98–107)
CO2 SERPL-SCNC: 21 MMOL/L (ref 22–29)
CREAT SERPL-MCNC: 0.65 MG/DL (ref 0.57–1)
DEPRECATED RDW RBC AUTO: 38.8 FL (ref 37–54)
EGFRCR SERPLBLD CKD-EPI 2021: 91.4 ML/MIN/1.73
EOSINOPHIL # BLD AUTO: 0.12 10*3/MM3 (ref 0–0.4)
EOSINOPHIL NFR BLD AUTO: 1.9 % (ref 0.3–6.2)
ERYTHROCYTE [DISTWIDTH] IN BLOOD BY AUTOMATED COUNT: 12.8 % (ref 12.3–15.4)
GLUCOSE SERPL-MCNC: 92 MG/DL (ref 65–99)
HCT VFR BLD AUTO: 28.7 % (ref 34–46.6)
HGB BLD-MCNC: 10 G/DL (ref 12–15.9)
IMM GRANULOCYTES # BLD AUTO: 0.02 10*3/MM3 (ref 0–0.05)
IMM GRANULOCYTES NFR BLD AUTO: 0.3 % (ref 0–0.5)
LYMPHOCYTES # BLD AUTO: 2.55 10*3/MM3 (ref 0.7–3.1)
LYMPHOCYTES NFR BLD AUTO: 40.6 % (ref 19.6–45.3)
MAGNESIUM SERPL-MCNC: 1.8 MG/DL (ref 1.6–2.4)
MCH RBC QN AUTO: 28.6 PG (ref 26.6–33)
MCHC RBC AUTO-ENTMCNC: 34.8 G/DL (ref 31.5–35.7)
MCV RBC AUTO: 82 FL (ref 79–97)
MONOCYTES # BLD AUTO: 0.98 10*3/MM3 (ref 0.1–0.9)
MONOCYTES NFR BLD AUTO: 15.6 % (ref 5–12)
NEUTROPHILS NFR BLD AUTO: 2.56 10*3/MM3 (ref 1.7–7)
NEUTROPHILS NFR BLD AUTO: 40.8 % (ref 42.7–76)
NRBC BLD AUTO-RTO: 0 /100 WBC (ref 0–0.2)
PHOSPHATE SERPL-MCNC: 2.7 MG/DL (ref 2.5–4.5)
PLATELET # BLD AUTO: 304 10*3/MM3 (ref 140–450)
PMV BLD AUTO: 9.2 FL (ref 6–12)
POTASSIUM SERPL-SCNC: 4.1 MMOL/L (ref 3.5–5.2)
QT INTERVAL: 450 MS
QTC INTERVAL: 495 MS
RBC # BLD AUTO: 3.5 10*6/MM3 (ref 3.77–5.28)
SODIUM SERPL-SCNC: 117 MMOL/L (ref 136–145)
SODIUM SERPL-SCNC: 117 MMOL/L (ref 136–145)
SODIUM SERPL-SCNC: 120 MMOL/L (ref 136–145)
WBC NRBC COR # BLD AUTO: 6.28 10*3/MM3 (ref 3.4–10.8)

## 2025-07-17 PROCEDURE — 84100 ASSAY OF PHOSPHORUS: CPT | Performed by: NURSE PRACTITIONER

## 2025-07-17 PROCEDURE — 94799 UNLISTED PULMONARY SVC/PX: CPT

## 2025-07-17 PROCEDURE — 25010000002 ONDANSETRON PER 1 MG: Performed by: NURSE PRACTITIONER

## 2025-07-17 PROCEDURE — 63710000001 DIPHENHYDRAMINE PER 50 MG: Performed by: NURSE PRACTITIONER

## 2025-07-17 PROCEDURE — 80048 BASIC METABOLIC PNL TOTAL CA: CPT | Performed by: NURSE PRACTITIONER

## 2025-07-17 PROCEDURE — 94664 DEMO&/EVAL PT USE INHALER: CPT

## 2025-07-17 PROCEDURE — 36415 COLL VENOUS BLD VENIPUNCTURE: CPT | Performed by: NURSE PRACTITIONER

## 2025-07-17 PROCEDURE — 25010000002 ENOXAPARIN PER 10 MG: Performed by: NURSE PRACTITIONER

## 2025-07-17 PROCEDURE — 25810000003 SODIUM CHLORIDE 0.9 % SOLUTION: Performed by: INTERNAL MEDICINE

## 2025-07-17 PROCEDURE — 84295 ASSAY OF SERUM SODIUM: CPT | Performed by: NURSE PRACTITIONER

## 2025-07-17 PROCEDURE — 85025 COMPLETE CBC W/AUTO DIFF WBC: CPT | Performed by: NURSE PRACTITIONER

## 2025-07-17 PROCEDURE — 94640 AIRWAY INHALATION TREATMENT: CPT

## 2025-07-17 PROCEDURE — 25010000003 DEXTROSE 5 % SOLUTION: Performed by: NURSE PRACTITIONER

## 2025-07-17 PROCEDURE — 94761 N-INVAS EAR/PLS OXIMETRY MLT: CPT

## 2025-07-17 PROCEDURE — 25010000002 DESMOPRESSIN ACETATE PF 4 MCG/ML SOLUTION

## 2025-07-17 PROCEDURE — 83735 ASSAY OF MAGNESIUM: CPT | Performed by: NURSE PRACTITIONER

## 2025-07-17 RX ORDER — MONTELUKAST SODIUM 10 MG/1
10 TABLET ORAL NIGHTLY
Status: DISCONTINUED | OUTPATIENT
Start: 2025-07-17 | End: 2025-07-20 | Stop reason: HOSPADM

## 2025-07-17 RX ORDER — DIPHENHYDRAMINE HCL 25 MG
25 CAPSULE ORAL ONCE
Status: COMPLETED | OUTPATIENT
Start: 2025-07-18 | End: 2025-07-17

## 2025-07-17 RX ORDER — BUDESONIDE AND FORMOTEROL FUMARATE DIHYDRATE 160; 4.5 UG/1; UG/1
2 AEROSOL RESPIRATORY (INHALATION)
Status: DISCONTINUED | OUTPATIENT
Start: 2025-07-17 | End: 2025-07-18

## 2025-07-17 RX ORDER — ONDANSETRON 2 MG/ML
4 INJECTION INTRAMUSCULAR; INTRAVENOUS ONCE
Status: COMPLETED | OUTPATIENT
Start: 2025-07-17 | End: 2025-07-17

## 2025-07-17 RX ORDER — DESMOPRESSIN ACETATE 4 UG/ML
1 INJECTION, SOLUTION INTRAVENOUS; SUBCUTANEOUS ONCE
Status: COMPLETED | OUTPATIENT
Start: 2025-07-17 | End: 2025-07-17

## 2025-07-17 RX ORDER — SODIUM CHLORIDE 9 MG/ML
100 INJECTION, SOLUTION INTRAVENOUS CONTINUOUS
Status: DISPENSED | OUTPATIENT
Start: 2025-07-17 | End: 2025-07-18

## 2025-07-17 RX ADMIN — SERTRALINE HYDROCHLORIDE 50 MG: 100 TABLET, FILM COATED ORAL at 20:54

## 2025-07-17 RX ADMIN — DOCUSATE SODIUM 50 MG AND SENNOSIDES 8.6 MG 2 TABLET: 8.6; 5 TABLET, FILM COATED ORAL at 09:22

## 2025-07-17 RX ADMIN — CHLORHEXIDINE GLUCONATE 1 APPLICATION: 500 CLOTH TOPICAL at 04:00

## 2025-07-17 RX ADMIN — DOCUSATE SODIUM 50 MG AND SENNOSIDES 8.6 MG 2 TABLET: 8.6; 5 TABLET, FILM COATED ORAL at 20:54

## 2025-07-17 RX ADMIN — BUDESONIDE AND FORMOTEROL FUMARATE DIHYDRATE 2 PUFF: 160; 4.5 AEROSOL RESPIRATORY (INHALATION) at 20:25

## 2025-07-17 RX ADMIN — BUDESONIDE AND FORMOTEROL FUMARATE DIHYDRATE 2 PUFF: 160; 4.5 AEROSOL RESPIRATORY (INHALATION) at 11:05

## 2025-07-17 RX ADMIN — ONDANSETRON 4 MG: 2 INJECTION INTRAMUSCULAR; INTRAVENOUS at 20:46

## 2025-07-17 RX ADMIN — Medication 1 APPLICATION: at 09:21

## 2025-07-17 RX ADMIN — SODIUM CHLORIDE 75 ML/HR: 9 INJECTION, SOLUTION INTRAVENOUS at 13:18

## 2025-07-17 RX ADMIN — IPRATROPIUM BROMIDE 0.5 MG: 0.5 SOLUTION RESPIRATORY (INHALATION) at 20:16

## 2025-07-17 RX ADMIN — ATORVASTATIN CALCIUM 10 MG: 10 TABLET, FILM COATED ORAL at 20:54

## 2025-07-17 RX ADMIN — PANTOPRAZOLE SODIUM 40 MG: 40 TABLET, DELAYED RELEASE ORAL at 09:20

## 2025-07-17 RX ADMIN — Medication 10 ML: at 20:53

## 2025-07-17 RX ADMIN — DESMOPRESSIN ACETATE 1 MCG: 4 INJECTION, SOLUTION INTRAVENOUS; SUBCUTANEOUS at 01:45

## 2025-07-17 RX ADMIN — ENOXAPARIN SODIUM 30 MG: 100 INJECTION SUBCUTANEOUS at 15:28

## 2025-07-17 RX ADMIN — ASPIRIN 81 MG: 81 TABLET, COATED ORAL at 09:20

## 2025-07-17 RX ADMIN — DIPHENHYDRAMINE HYDROCHLORIDE 25 MG: 25 CAPSULE ORAL at 23:17

## 2025-07-17 RX ADMIN — MONTELUKAST 10 MG: 10 TABLET, FILM COATED ORAL at 20:54

## 2025-07-17 RX ADMIN — IPRATROPIUM BROMIDE 0.5 MG: 0.5 SOLUTION RESPIRATORY (INHALATION) at 10:34

## 2025-07-17 RX ADMIN — Medication 1 APPLICATION: at 20:53

## 2025-07-17 RX ADMIN — Medication 10 ML: at 09:21

## 2025-07-17 RX ADMIN — DEXTROSE MONOHYDRATE 227.4 ML: 50 INJECTION, SOLUTION INTRAVENOUS at 00:38

## 2025-07-17 RX ADMIN — IPRATROPIUM BROMIDE 0.5 MG: 0.5 SOLUTION RESPIRATORY (INHALATION) at 14:07

## 2025-07-17 NOTE — PROGRESS NOTES
[facility] Intensivist Services  INPATIENT PROGRESS NOTE    Patient Name: Leila Rubin  Date of Admission: 7/16/2025  Today's Date: 07/17/25  Length of Stay: 1  Primary Care Physician: Kaylan Kowalski MD    Subjective     HPI   The patient is doing well this morning. Alert and interactive. No c/o. No vomiting overnight.    Objective    Temp:  [97.6 °F (36.4 °C)-98.1 °F (36.7 °C)] 97.8 °F (36.6 °C)  Heart Rate:  [58-86] 67  Resp:  [16-22] 16  BP: (110-171)/() 110/42  Physical Exam  NAD, AAOx3, hard of hearing  RRR no MRG  CTAB  Soft, NTND, NABS      Results Review:  Lab Results (last 24 hours)       Procedure Component Value Units Date/Time    CBC & Differential [[id number]]  (Abnormal) Collected: 07/17/25 0636    Specimen: Blood Updated: 07/17/25 0658    Narrative:      The following orders were created for panel order CBC & Differential.  Procedure                               Abnormality         Status                     ---------                               -----------         ------                     CBC Auto Differential[[id number]]        Abnormal            Final result                 Please view results for these tests on the individual orders.    CBC Auto Differential [[id number]]  (Abnormal) Collected: 07/17/25 0636    Specimen: Blood Updated: 07/17/25 0658     WBC 6.28 10*3/mm3      RBC 3.50 10*6/mm3      Hemoglobin 10.0 g/dL      Hematocrit 28.7 %      MCV 82.0 fL      MCH 28.6 pg      MCHC 34.8 g/dL      RDW 12.8 %      RDW-SD 38.8 fl      MPV 9.2 fL      Platelets 304 10*3/mm3      Neutrophil % 40.8 %      Lymphocyte % 40.6 %      Monocyte % 15.6 %      Eosinophil % 1.9 %      Basophil % 0.8 %      Immature Grans % 0.3 %      Neutrophils, Absolute 2.56 10*3/mm3      Lymphocytes, Absolute 2.55 10*3/mm3      Monocytes, Absolute 0.98 10*3/mm3      Eosinophils, Absolute 0.12 10*3/mm3      Basophils, Absolute 0.05 10*3/mm3      Immature Grans, Absolute 0.02  10*3/mm3      nRBC 0.0 /100 WBC     Basic Metabolic Panel [[id number]] Collected: 07/17/25 0636    Specimen: Blood Updated: 07/17/25 0649    Magnesium [[id number]] Collected: 07/17/25 0636    Specimen: Blood Updated: 07/17/25 0649    Phosphorus [[id number]] Collected: 07/17/25 0636    Specimen: Blood Updated: 07/17/25 0649    Sodium [[id number]]  (Abnormal) Collected: 07/16/25 2339    Specimen: Blood Updated: 07/16/25 2359     Sodium 121 mmol/L     Potassium [[id number]]  (Normal) Collected: 07/16/25 2109    Specimen: Blood Updated: 07/16/25 2152     Potassium 4.3 mmol/L     Sodium [[id number]]  (Abnormal) Collected: 07/16/25 2109    Specimen: Blood Updated: 07/16/25 2132     Sodium 122 mmol/L     Sodium [[id number]]  (Abnormal) Collected: 07/16/25 1619    Specimen: Blood Updated: 07/16/25 1657     Sodium 118 mmol/L     Osmolality, Urine - Urine, Clean Catch [[id number]]  (Normal) Collected: 07/16/25 1131    Specimen: Urine, Clean Catch Updated: 07/16/25 1347     Osmolality, Urine 272 mOsm/kg     Sodium, Urine, Random - Urine, Clean Catch [[id number]] Collected: 07/16/25 1131    Specimen: Urine, Clean Catch Updated: 07/16/25 1328     Sodium, Urine 50 mmol/L     Narrative:      Reference intervals for random urine have not been established.  Clinical usage is dependent upon physician's interpretation in combination with other laboratory tests.       Osmolality, Serum [[id number]]  (Abnormal) Collected: 07/16/25 1037    Specimen: Blood from Arm, Left Updated: 07/16/25 1327     Osmolality 237 mOsm/kg     Urinalysis With Culture If Indicated - Urine, Clean Catch [[id number]]  (Abnormal) Collected: 07/16/25 1131    Specimen: Urine, Clean Catch Updated: 07/16/25 1141     Color, UA Yellow     Appearance, UA Clear     pH, UA 6.0     Specific Gravity, UA 1.016     Glucose, UA Negative     Ketones, UA Trace     Bilirubin, UA Negative     Blood, UA Negative     Protein, UA Negative     Leuk Esterase, UA Negative     Nitrite, UA Negative      Urobilinogen, UA 1.0 E.U./dL    Narrative:      In absence of clinical symptoms, the presence of pyuria, bacteria, and/or nitrites on the urinalysis result does not correlate with infection.  Urine microscopic not indicated.    Comprehensive Metabolic Panel [[id number]]  (Abnormal) Collected: 07/16/25 1037    Specimen: Blood from Arm, Left Updated: 07/16/25 1121     Glucose 120 mg/dL      BUN 10.0 mg/dL      Creatinine 0.74 mg/dL      Sodium 111 mmol/L      Potassium 3.2 mmol/L      Comment: Slight hemolysis detected by analyzer. Result may be falsely elevated.        Chloride 73 mmol/L      CO2 22.0 mmol/L      Calcium 9.5 mg/dL      Total Protein 8.0 g/dL      Albumin 4.7 g/dL      ALT (SGPT) 10 U/L      AST (SGOT) 30 U/L      Alkaline Phosphatase 64 U/L      Total Bilirubin 1.0 mg/dL      Globulin 3.3 gm/dL      A/G Ratio 1.4 g/dL      BUN/Creatinine Ratio 13.5     Anion Gap 16.0 mmol/L      eGFR 84.0 mL/min/1.73     Narrative:      GFR Categories in Chronic Kidney Disease (CKD)              GFR Category          GFR (mL/min/1.73)    Interpretation  G1                    90 or greater        Normal or high (1)  G2                    60-89                Mild decrease (1)  G3a                   45-59                Mild to moderate decrease  G3b                   30-44                Moderate to severe decrease  G4                    15-29                Severe decrease  G5                    14 or less           Kidney failure    (1)In the absence of evidence of kidney disease, neither GFR category G1 or G2 fulfill the criteria for CKD.    eGFR calculation 2021 CKD-EPI creatinine equation, which does not include race as a factor    Lipase [[id number]]  (Abnormal) Collected: 07/16/25 1037    Specimen: Blood from Arm, Left Updated: 07/16/25 1116     Lipase 68 U/L     CBC & Differential [[id number]]  (Abnormal) Collected: 07/16/25 1037    Specimen: Blood from Arm, Left Updated: 07/16/25 1105    Narrative:      The  following orders were created for panel order CBC & Differential.  Procedure                               Abnormality         Status                     ---------                               -----------         ------                     CBC Auto Differential[[id number]]        Abnormal            Final result                 Please view results for these tests on the individual orders.    CBC Auto Differential [[id number]]  (Abnormal) Collected: 07/16/25 1037    Specimen: Blood from Arm, Left Updated: 07/16/25 1105     WBC 6.85 10*3/mm3      RBC 4.03 10*6/mm3      Hemoglobin 11.6 g/dL      Hematocrit 32.6 %      MCV 80.9 fL      MCH 28.8 pg      MCHC 35.6 g/dL      RDW 12.5 %      RDW-SD 36.8 fl      MPV 8.9 fL      Platelets 371 10*3/mm3      Neutrophil % 53.0 %      Lymphocyte % 34.6 %      Monocyte % 11.1 %      Eosinophil % 0.6 %      Basophil % 0.4 %      Immature Grans % 0.3 %      Neutrophils, Absolute 3.63 10*3/mm3      Lymphocytes, Absolute 2.37 10*3/mm3      Monocytes, Absolute 0.76 10*3/mm3      Eosinophils, Absolute 0.04 10*3/mm3      Basophils, Absolute 0.03 10*3/mm3      Immature Grans, Absolute 0.02 10*3/mm3      nRBC 0.0 /100 WBC     [place] Draw [[id number]] Collected: 07/16/25 1037    Specimen: Blood Updated: 07/16/25 1100    Narrative:      The following orders were created for panel order [place] Draw.  Procedure                               Abnormality         Status                     ---------                               -----------         ------                     Green Top (Gel)[[id number]]                                  Final result               Lavender Top[[id number]]                                     Final result               Red Top[[id number]]                                          Final result               Gray Top[[id number]]                                         Final result               Light Blue Top[[id number]]                                   Final result                  Please view results for these tests on the individual orders.    Green Top (Gel) [[id number]] Collected: 07/16/25 1037    Specimen: Blood from Arm, Left Updated: 07/16/25 1100     Extra Tube Hold for add-ons.     Comment: Auto resulted.       Lavender Top [[id number]] Collected: 07/16/25 1037    Specimen: Blood from Arm, Left Updated: 07/16/25 1100     Extra Tube hold for add-on     Comment: Auto resulted       Red Top [[id number]] Collected: 07/16/25 1037    Specimen: Blood from Arm, Left Updated: 07/16/25 1100     Extra Tube Hold for add-ons.     Comment: Auto resulted.       Gray Top [[id number]] Collected: 07/16/25 1037    Specimen: Blood from Arm, Left Updated: 07/16/25 1100     Extra Tube Hold for add-ons.     Comment: Auto resulted.       Light Blue Top [[id number]] Collected: 07/16/25 1037    Specimen: Blood Updated: 07/16/25 1100     Extra Tube Hold for add-ons.     Comment: Auto resulted                XR Chest 1 View  Result Date: 7/16/2025  1. Chronic lung changes. 2. No acute infiltrate.      This report was signed and finalized on 7/16/2025 11:48 AM by Dr. Romeo Agrawal MD.      CT Abdomen Pelvis With Contrast  Result Date: 7/16/2025  1. An area of circumferential wall thickening and narrowing of the ascending colon adjacent and distal to the cecum may represent an inflammatory/infectious process. No obstruction. This may partly be due to incomplete distention. Further follow-up may be obtained. 2. A long segment of circumferential thickening of the wall of the descending colon with mild surrounding peritoneal infiltration may represent acute nonspecific colitis?. This may partly be due to incomplete distention. 3. Diverticulosis of the colon. No finding to suggest diverticulitis. 4. The appendix is not optimally visualized. There are no findings in the expected area of the appendix to suggest appendicitis. 5. Other nonacute findings similar to the previous study.               This report was signed  and finalized on 7/16/2025 11:47 AM by Dr. Deisy Bolaños MD.        Result Review:  I have personally reviewed the results from the time of this admission to 7/17/2025 07:09 CDT and agree with these findings:  [x]  Laboratory list / accordion  []  Microbiology  []  Radiology  []  EKG/Telemetry   []  Cardiology/Vascular   []  Pathology  []  Old records  []  Other:    Assessment/Plan   Assessment  Active Hospital Problems    Diagnosis     **Hyponatremia        Treatment Plan  The patient is a 75yo woman with a history of HTN who had recently been started on HCTZ who was admitted on 7/16 for hypoNa (Na 111 on presentation). The patient has had no neurologic deficits related to her hypoNa, and her Na is correcting appropriately.    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  -Na corrected from 111 to 122 overnight, so NS was stopped and she was given D5W and desmopressin. Na this morning is pending.  -Will decide on whether to restart NS depending on Na result this morning.  -HCTZ d/c'ed    2. NV  -The patient had 3-4 days of vomiting prior to admission. No vomiting since admission.  -Follow    CCT 35 min    Electronically signed by Wilber Aguilar MD on 7/17/2025 at 07:09 CDT

## 2025-07-17 NOTE — PLAN OF CARE
Goal Outcome Evaluation:  Plan of Care Reviewed With: patient, child        Progress: no change

## 2025-07-17 NOTE — CASE MANAGEMENT/SOCIAL WORK
Discharge Planning Assessment   [place]     Patient Name: Leila Rubin  MRN: [id number]  Today's Date: 7/17/2025    Admit Date: 7/16/2025        Discharge Needs Assessment       Row Name 07/17/25 1039       Living Environment    People in Home alone    Current Living Arrangements home    Primary Care Provided by child(cesar)    Provides Primary Care For no one    Family Caregiver if Needed child(cesar), adult    Family Caregiver Names Steven    Able to Return to Prior Arrangements yes       Resource/Environmental Concerns    Resource/Environmental Concerns none       Transportation Needs    In the past 12 months, has lack of transportation kept you from medical appointments or from getting medications? no    In the past 12 months, has lack of transportation kept you from meetings, work, or from getting things needed for daily living? No       Food Insecurity    Within the past 12 months, you worried that your food would run out before you got the money to buy more. Never true    Within the past 12 months, the food you bought just didn't last and you didn't have money to get more. Never true       Transition Planning    Patient/Family Anticipates Transition to home with family    Patient/Family Anticipated Services at Transition none    Transportation Anticipated family or friend will provide       Discharge Needs Assessment    Readmission Within the Last 30 Days no previous admission in last 30 days    Equipment Currently Used at Home nebulizer    Concerns to be Addressed no discharge needs identified    Equipment Needed After Discharge none    Discharge Coordination/Progress spoke to patient and son patient lives alone with son nearby and available to assist her when needed; has RX coverage and PCP; will follow for DC needs                   Discharge Plan    No documentation.                 Continued Care and Services - Admitted Since 7/16/2025    No active coordination exists.          Demographic Summary     No documentation.                  Functional Status    No documentation.                  Psychosocial    No documentation.                  Abuse/Neglect    No documentation.                  Legal    No documentation.                  Substance Abuse    No documentation.                  Patient Forms    No documentation.                     Deepti Landeros RN

## 2025-07-17 NOTE — PAYOR COMM NOTE
"Leila Castrejon (74 y.o. Female)   [id number]  admit  INPT  7/17    [facility] phone     fax           7/16 observation          Date of Birth   1950    Social Security Number       Address   [address] [place] [address]    Home Phone   [phone]    MRN   [id number]       Zoroastrian   Other    Marital Status                               Admission Date   7/16/2025    Admission Type   Emergency    Admitting Provider   Wilber Aguilar MD    Attending Provider   Wilber Aguilar MD    Department, Room/Bed   [facility] CARDIAC CARE, C009/1       Discharge Date       Discharge Disposition       Discharge Destination                                 Attending Provider: Wilber Aguilar MD    Allergies: No Known Allergies    Isolation: None   Infection: None   Code Status: CPR    Ht: 162.6 cm (64\")   Wt: 40.1 kg (88 lb 6.5 oz)    Admission Cmt: None   Principal Problem: Hyponatremia [E87.1]                   Active Insurance as of 7/16/2025       Primary Coverage       Payor Plan Insurance Group Employer/Plan Group    ANTHEM MEDICARE REPLACEMENT ANTHEM MEDICARE ADVANTAGE HMO [id number]       Payor Plan Address Payor Plan Phone Number Payor Plan Fax Number Effective Dates    [address] [phone]  1/1/2025 - None Entered    [facility] [address]         Subscriber Name Subscriber Birth Date Member ID       LEILA CASTREJON 1950 [id number]                     Emergency Contacts        (Rel.) Home Phone Work Phone Mobile Phone    Lindsay Mejía (Daughter) [phone] -- [phone]    Lor Andino (Grandchild) [phone] -- [phone]    ScottieSteven davila (Son) -- -- [phone]                 History & Physical        Wilber Boggs APRN at 07/16/25 1313       Attestation signed by Wilber Aguilar MD at 07/16/25 1412      I have reviewed this documentation " and agree.                           [facility] Intensivist Services    Date of Admission: 7/16/2025  Date of Note: 07/16/25  Primary Care Physician: Kaylan Kowalski MD    History     74 y.o. female with past medical history of primary HTN, hypothyroidism, HLD, bronchiectasis, depression and GERD, presented to the ED today with severe weakness after 4 days of N&V. Pt was in usual state of health when she became ill with N&V for past 4 days. Has had little to no oral intake. Family believed she was severely dehydrated and deconditioned as she does not eat or drink very much at her baseline. Upon arrival in the ED, pt was found to be severely hyponatremic with a Na+ level of 111. Has never had issues in the past with hyponatremia or N&V. Of note, pt did just start HCTZ as outpatient for her HTN. CT abd/pelvis with contrast indicated areas of thickening in ascending and descending colon, possible colitis vs underdistention. No other signs of infectious process. She did receive a dose of Rocephin in the ED and her K+ was replaced. The intensivist service was contacted by the ED physician to evaluate the patient for admission to critical care. Upon exam in ED, pt alert, no distress. Very hard of hearing and did not have hearing aids in at present. Family member at bedside able to relay information to her. BP and HR stable, no pressors. No oxygenation issues on RA. Pt will be admitted to Critical Care Medicine service for further treatment and investigation into her hyponatremia.     Past Medical History     Active and Resolved Problems  Active Hospital Problems    Diagnosis  POA    **Hyponatremia [E87.1]  Yes      Resolved Hospital Problems   No resolved problems to display.       Past Medical History:   Past Medical History:   Diagnosis Date    Bronchiectasis without complication 4/17/2023    GERD without esophagitis 4/17/2023    Hiatal hernia 4/17/2023    History of environmental  allergies 4/17/2023    Multiple lung nodules on CT 4/17/2023    Personal history of nicotine dependence 4/17/2023       Prior Surgeries: She  has a past surgical history that includes Hysterectomy; Oophorectomy; Excision / biopsy breast / nipple / duct (Left, 2015); Colonoscopy (03/10/2014); and Esophagogastroduodenoscopy (01/15/2013).    Past Surgical History:   Past Surgical History:   Procedure Laterality Date    COLONOSCOPY  03/10/2014    Tics otherwise normal exam repeat in 5 years    ENDOSCOPY  01/15/2013    Moderate chronic active inflammation negative for Intestinal Metaplasia    EXCISION / BIOPSY BREAST / NIPPLE / DUCT Left 2015    benign    HYSTERECTOMY      OOPHORECTOMY         Social and Family History     Family History:  family history is not on file. She was adopted.    Tobacco/Social History:  reports that she quit smoking about 12 years ago. Her smoking use included cigarettes. She started smoking about 42 years ago. She has a 30 pack-year smoking history. She has been exposed to tobacco smoke. She has never used smokeless tobacco. She reports that she does not drink alcohol.    Allergies     Allergies:   She has no known allergies.    No Known Allergies    Labs     CBC          4/1/2025    14:13 5/19/2025    15:33 7/16/2025    10:37   CBC   WBC 9.6     11     6.85    RBC 3.81     3.64     4.03    Hemoglobin 11.2     10.8     11.6    Hematocrit 34.4     34.2     32.6    MCV 90.3     94     80.9    MCH 29.4     29.7     28.8    MCHC 32.6     31.6     35.6    RDW 13.5     13.6     12.5    Platelets 454     400     371       Details          This result is from an external source.             CMP          4/1/2025    14:13 5/19/2025    15:33 7/16/2025    10:37   CMP   Glucose 116     122     120    BUN 9     9     10.0    Creatinine 0.8     0.7     0.74    EGFR   84.0    Sodium 135     137     111    Potassium 4.5     4.5     3.2    Chloride 98     100     73    Calcium 9.8     9.8     9.5    Total  "Protein 7.8     7.4     8.0    Albumin 4.6     4.4     4.7    Globulin   3.3    Total Bilirubin 0.3     0.3     1.0    Alkaline Phosphatase 65     87     64    AST (SGOT) 24     35     30    ALT (SGPT) 10     17     10    Albumin/Globulin Ratio   1.4    BUN/Creatinine Ratio   13.5    Anion Gap 12     11     16.0       Details          This result is from an external source.             Inpatient Medications     Medications: Scheduled Meds:Chlorhexidine Gluconate Cloth, 1 Application, Topical, Once  [START ON 7/17/2025] Chlorhexidine Gluconate Cloth, 1 Application, Topical, Q24H  enoxaparin sodium, 30 mg, Subcutaneous, Q24H  mupirocin, 1 Application, Each Nare, BID  potassium chloride, 10 mEq, Intravenous, Once   And  potassium chloride, 10 mEq, Intravenous, Once   And  potassium chloride, 10 mEq, Intravenous, Once  senna-docusate sodium, 2 tablet, Oral, BID  sodium chloride, 10 mL, Intravenous, Q12H      Continuous Infusions:dextrose, 6 mL/kg      PRN Meds:.  senna-docusate sodium **AND** polyethylene glycol **AND** bisacodyl **AND** bisacodyl    Calcium Replacement - Follow Nurse / BPA Driven Protocol    dextrose    Magnesium Standard Dose Replacement - Follow Nurse / BPA Driven Protocol    nitroglycerin    Phosphorus Replacement - Follow Nurse / BPA Driven Protocol    Potassium Replacement - Follow Nurse / BPA Driven Protocol    [COMPLETED] Insert Peripheral IV **AND** sodium chloride    sodium chloride    sodium chloride    I have reviewed the patient's current medications.   Outpatient Medications     Outpatient Medications:   No outpatient medications have been marked as taking for the 7/16/25 encounter (Hospital Encounter).       Current Antibiotics     This patient does not have an active medication from one of the medication groupers.    Exam     Vitals: Her  height is 149.9 cm (59\") and weight is 37.9 kg (83 lb 8 oz). Her oral temperature is 97.6 °F (36.4 °C). Her blood pressure is 165/66 and her pulse is " 71. Her respiration is 16 and oxygen saturation is 97%.     GENERAL:  Alert, no acute distress.   SKIN:  Warm, dry  EYES:  Pupils equal, round and reactive to light.  EOMs intact.    HEAD:  Normocephalic.  NECK:  Supple   RESP:  Lungs clear to auscultation. Good airflow. Normal respiratory effort.   CARDIAC:  Regular rate and rhythm. Normal S1,S2. No edema  GI:  Soft, nontender  MSK:  Decreased muscle bulk, generalized weakness  NEUROLOGICAL:  No focal neurological deficits. Oriented to self, time, place, situation    Results and Cultures Review     Result Review:  I have personally reviewed the results from the time of this admission to 7/16/2025 13:13 CDT and agree with these findings:  [x]  Laboratory list / accordion  []  Microbiology  [x]  Radiology  [x]  EKG/Telemetry   []  Cardiology/Vascular   []  Pathology  [x]  Old records  []  Other:    XR Chest 1 View  Result Date: 7/16/2025  1. Chronic lung changes. 2. No acute infiltrate.      This report was signed and finalized on 7/16/2025 11:48 AM by Dr. Romeo Agrawal MD.      CT Abdomen Pelvis With Contrast  Result Date: 7/16/2025  1. An area of circumferential wall thickening and narrowing of the ascending colon adjacent and distal to the cecum may represent an inflammatory/infectious process. No obstruction. This may partly be due to incomplete distention. Further follow-up may be obtained. 2. A long segment of circumferential thickening of the wall of the descending colon with mild surrounding peritoneal infiltration may represent acute nonspecific colitis?. This may partly be due to incomplete distention. 3. Diverticulosis of the colon. No finding to suggest diverticulitis. 4. The appendix is not optimally visualized. There are no findings in the expected area of the appendix to suggest appendicitis. 5. Other nonacute findings similar to the previous study.               This report was signed and finalized on 7/16/2025 11:47 AM by Dr. Deisy Bolaños MD.         Assessment/Plan     Active Hospital Problems    Diagnosis  POA    **Hyponatremia [E87.1]  Yes   73 yo female with Hyponatremia, likely secondary to new medication, HCTZ, in conjunction with volume contraction, and possibly aspect of SIADH from prolonged vomiting, being admitted to the ICU for critical care management.     Hyponatremia  - receiving 500ml normal saline bolus now  - Will repeat fluid bolus with 1L NS as I feel she is volume contracted  - check repeat sodium level after bolus  - check serum osmolality, urine osmo, urine sodium levels  - further sodium and fluid management pending above studies   - goal for slow steady repletion, 4 meq in first 6 hr, 6 meq first 12 hr, 8 meq first 24 hr  - PRN D5W for rapid overcorrection  - q2 hr neurochecks  - Stop HCTZ    N&V, ? Colitis  - colitis vs underdistention on CT abd/pelvis, received one dose of rocephin in ED  - no fever, no leukocytosis, will hold on further antimicrobials at this time  - PRN antiemetics    Chronic medical problems  - hypothyroidism: home dose levothyroxine  - HTN: stopping HCTZ, PRN antihypertensives if SBP>170, may need to change to ACE/ARB for primary HTN  - GERD: home PPI  - Depression: home sertraline    VTE: lovenox  GI: home PPI  NTN: regular diet  Abx: n/a, one dose of rocephin  Lines/Tubes: peripheral IV  CODE STATUS: full resuscitation, will clarify with patient and family once she has hearing aids in     Total critical care time: 45 minutes     Due to a high probability of clinically significant, life threatening deterioration, the patient required my highest level of preparedness to intervene emergently and I personally spent this critical care time directly and personally managing the patient.      This critical care time included obtaining a history; examining the patient; pulse oximetry; ordering and review of studies; arranging urgent treatment with development of a management plan; evaluation of patient's response to  treatment; frequent reassessment; and, discussions with other providers.     This critical care time was performed to assess and manage the high probability of imminent, life-threatening deterioration that could result in multi-organ failure. It was exclusive of separately billable procedures and treating other patients and teaching time.     Please see MDM section and the rest of the note for further information on patient assessment and treatment.     Part of this note may be an electronic transcription/translation of spoken language to printed text using the Dragon dictation system       Electronically signed by TIMUR Mandujano on 7/16/2025 at 13:34 CDT                Electronically signed by Wilber Aguilar MD at 07/16/25 1412          Emergency Department Notes        Dahlia Perera MD at 07/16/25 1119          Subjective   History of Present Illness  Patient is a 74-year-old female with a history of GERD who presents to the ER with nausea and vomiting.  Patient states she has had nausea and vomiting for the last 4 days along with decreased appetite and generalized weakness.  Patient and her son think she is dehydrated.  Patient states she did have some diarrhea a few days ago but none in the last few days.  She denies any fever, chest pain, shortness of air, abdominal pain, urinary changes, neurologic changes.  Patient states she was so weak today that she could not get up to even get anything to eat or drink.  She does live alone.      Review of Systems   Constitutional:  Positive for appetite change.   HENT: Negative.     Eyes: Negative.    Respiratory: Negative.     Cardiovascular: Negative.    Gastrointestinal:  Positive for diarrhea, nausea and vomiting.   Endocrine: Negative.    Genitourinary: Negative.    Musculoskeletal: Negative.    Skin: Negative.    Allergic/Immunologic: Negative.    Neurological:  Positive for weakness.   Hematological: Negative.    Psychiatric/Behavioral: Negative.      All other systems reviewed and are negative.      Past Medical History:   Diagnosis Date    Bronchiectasis without complication 2023    GERD without esophagitis 2023    Hiatal hernia 2023    History of environmental allergies 2023    Multiple lung nodules on CT 2023    Personal history of nicotine dependence 2023       No Known Allergies    Past Surgical History:   Procedure Laterality Date    COLONOSCOPY  03/10/2014    Tics otherwise normal exam repeat in 5 years    ENDOSCOPY  01/15/2013    Moderate chronic active inflammation negative for Intestinal Metaplasia    EXCISION / BIOPSY BREAST / NIPPLE / DUCT Left     benign    HYSTERECTOMY      OOPHORECTOMY         Family History   Adopted: Yes       Social History     Socioeconomic History    Marital status:    Tobacco Use    Smoking status: Former     Current packs/day: 0.00     Average packs/day: 1 pack/day for 30.0 years (30.0 ttl pk-yrs)     Types: Cigarettes     Start date:      Quit date:      Years since quittin.5     Passive exposure: Past    Smokeless tobacco: Never   Substance and Sexual Activity    Alcohol use: Never           Objective   Physical Exam  Vitals and nursing note reviewed.   Constitutional:       Appearance: She is well-developed.   HENT:      Head: Normocephalic and atraumatic.   Eyes:      Conjunctiva/sclera: Conjunctivae normal.      Pupils: Pupils are equal, round, and reactive to light.   Cardiovascular:      Rate and Rhythm: Normal rate and regular rhythm.      Heart sounds: Normal heart sounds.   Pulmonary:      Effort: Pulmonary effort is normal.      Breath sounds: Normal breath sounds.   Abdominal:      Palpations: Abdomen is soft.      Tenderness: There is no abdominal tenderness. There is no right CVA tenderness, left CVA tenderness, guarding or rebound.   Musculoskeletal:         General: No deformity. Normal range of motion.      Cervical back: Normal range of motion.    Skin:     General: Skin is warm.   Neurological:      Mental Status: She is alert and oriented to person, place, and time.   Psychiatric:         Behavior: Behavior normal.         Procedures          ED Course        EKG as interpreted by me: Normal sinus rhythm with rate of 73, no acute ischemia or infarction                                     Lab Results (last 24 hours)       Procedure Component Value Units Date/Time    CBC & Differential [[id number]]  (Abnormal) Collected: 07/16/25 1037    Specimen: Blood from Arm, Left Updated: 07/16/25 1105    Narrative:      The following orders were created for panel order CBC & Differential.  Procedure                               Abnormality         Status                     ---------                               -----------         ------                     CBC Auto Differential[[id number]]        Abnormal            Final result                 Please view results for these tests on the individual orders.    Comprehensive Metabolic Panel [[id number]]  (Abnormal) Collected: 07/16/25 1037    Specimen: Blood from Arm, Left Updated: 07/16/25 1121     Glucose 120 mg/dL      BUN 10.0 mg/dL      Creatinine 0.74 mg/dL      Sodium 111 mmol/L      Potassium 3.2 mmol/L      Comment: Slight hemolysis detected by analyzer. Result may be falsely elevated.        Chloride 73 mmol/L      CO2 22.0 mmol/L      Calcium 9.5 mg/dL      Total Protein 8.0 g/dL      Albumin 4.7 g/dL      ALT (SGPT) 10 U/L      AST (SGOT) 30 U/L      Alkaline Phosphatase 64 U/L      Total Bilirubin 1.0 mg/dL      Globulin 3.3 gm/dL      A/G Ratio 1.4 g/dL      BUN/Creatinine Ratio 13.5     Anion Gap 16.0 mmol/L      eGFR 84.0 mL/min/1.73     Narrative:      GFR Categories in Chronic Kidney Disease (CKD)              GFR Category          GFR (mL/min/1.73)    Interpretation  G1                    90 or greater        Normal or high (1)  G2                    60-89                Mild decrease (1)  G3a                    45-59                Mild to moderate decrease  G3b                   30-44                Moderate to severe decrease  G4                    15-29                Severe decrease  G5                    14 or less           Kidney failure    (1)In the absence of evidence of kidney disease, neither GFR category G1 or G2 fulfill the criteria for CKD.    eGFR calculation 2021 CKD-EPI creatinine equation, which does not include race as a factor    Lipase [[id number]]  (Abnormal) Collected: 07/16/25 1037    Specimen: Blood from Arm, Left Updated: 07/16/25 1116     Lipase 68 U/L     CBC Auto Differential [[id number]]  (Abnormal) Collected: 07/16/25 1037    Specimen: Blood from Arm, Left Updated: 07/16/25 1105     WBC 6.85 10*3/mm3      RBC 4.03 10*6/mm3      Hemoglobin 11.6 g/dL      Hematocrit 32.6 %      MCV 80.9 fL      MCH 28.8 pg      MCHC 35.6 g/dL      RDW 12.5 %      RDW-SD 36.8 fl      MPV 8.9 fL      Platelets 371 10*3/mm3      Neutrophil % 53.0 %      Lymphocyte % 34.6 %      Monocyte % 11.1 %      Eosinophil % 0.6 %      Basophil % 0.4 %      Immature Grans % 0.3 %      Neutrophils, Absolute 3.63 10*3/mm3      Lymphocytes, Absolute 2.37 10*3/mm3      Monocytes, Absolute 0.76 10*3/mm3      Eosinophils, Absolute 0.04 10*3/mm3      Basophils, Absolute 0.03 10*3/mm3      Immature Grans, Absolute 0.02 10*3/mm3      nRBC 0.0 /100 WBC     Urinalysis With Culture If Indicated - Urine, Clean Catch [[id number]]  (Abnormal) Collected: 07/16/25 1131    Specimen: Urine, Clean Catch Updated: 07/16/25 1141     Color, UA Yellow     Appearance, UA Clear     pH, UA 6.0     Specific Gravity, UA 1.016     Glucose, UA Negative     Ketones, UA Trace     Bilirubin, UA Negative     Blood, UA Negative     Protein, UA Negative     Leuk Esterase, UA Negative     Nitrite, UA Negative     Urobilinogen, UA 1.0 E.U./dL    Narrative:      In absence of clinical symptoms, the presence of pyuria, bacteria, and/or nitrites on the  urinalysis result does not correlate with infection.  Urine microscopic not indicated.           XR Chest 1 View   Final Result   1. Chronic lung changes.   2. No acute infiltrate.                       This report was signed and finalized on 7/16/2025 11:48 AM by Dr. Romeo Agrawal MD.          CT Abdomen Pelvis With Contrast   Final Result   1. An area of circumferential wall thickening and narrowing of the   ascending colon adjacent and distal to the cecum may represent an   inflammatory/infectious process. No obstruction. This may partly be due   to incomplete distention. Further follow-up may be obtained.   2. A long segment of circumferential thickening of the wall of the   descending colon with mild surrounding peritoneal infiltration may   represent acute nonspecific colitis?. This may partly be due to   incomplete distention.   3. Diverticulosis of the colon. No finding to suggest diverticulitis.   4. The appendix is not optimally visualized. There are no findings in   the expected area of the appendix to suggest appendicitis.   5. Other nonacute findings similar to the previous study.                                                           This report was signed and finalized on 7/16/2025 11:47 AM by Dr. Deisy Bolaños MD.                         Medical Decision Making  Patient is a 74-year-old female with a history of GERD who presents to the ER with nausea and vomiting.  Patient states she has had nausea and vomiting for the last 4 days along with decreased appetite and generalized weakness.  Patient and her son think she is dehydrated.  Patient states she did have some diarrhea a few days ago but none in the last few days.  She denies any fever, chest pain, shortness of air, abdominal pain, urinary changes, neurologic changes.  Patient states she was so weak today that she could not get up to even get anything to eat or drink.  She does live alone.    Differential diagnosis: Dehydration,  electrolyte abnormality, abdominal infection, gastroenteritis    Patient was given IV fluids and Zofran.  Labs showed hyponatremia, hypokalemia, hypochloremia, mild anemia and mildly elevated lipase.  Potassium was replaced intravenously.  Chest x-ray showed no acute findings.  CT scan of the abdomen pelvis showed an area of circumferential wall thickening and narrowing of the ascending colon concerning for inflammatory versus infection process.  There was no obstruction.  Patient also had fatty liver and hiatal hernia.  Patient was given Rocephin.  I discussed the case with Dr. Aguilar with the intensivist service and patient was admitted for further workup and treatment.      Problems Addressed:  Colitis: complicated acute illness or injury  Hypochloremia: complicated acute illness or injury  Hypokalemia: complicated acute illness or injury  Hyponatremia: complicated acute illness or injury  Nausea and vomiting, unspecified vomiting type: complicated acute illness or injury    Amount and/or Complexity of Data Reviewed  Labs: ordered. Decision-making details documented in ED Course.  Radiology: ordered. Decision-making details documented in ED Course.  ECG/medicine tests: ordered. Decision-making details documented in ED Course.  Discussion of management or test interpretation with external provider(s): Dr. Aguilar with the intensivist service    Risk  Prescription drug management.  Decision regarding hospitalization.        Final diagnoses:   Nausea and vomiting, unspecified vomiting type   Hyponatremia   Hypochloremia   Hypokalemia   Colitis       ED Disposition  ED Disposition       ED Disposition   Decision to Admit    Condition   --    Comment   Level of Care: Critical Care [6]   Diagnosis: Hyponatremia [854944]   Admitting Physician: MANJIT AGUILAR [[id number]]   Attending Physician: MANJIT AGUILAR [[id number]]   Certification: I Certify That Inpatient Hospital Services Are Medically Necessary For  Greater Than 2 Midnights                 No follow-up provider specified.       Medication List      No changes were made to your prescriptions during this visit.            Dahlia Perera MD  07/16/25 1245      Electronically signed by Dahlia Perera MD at 07/16/25 1245       Vital Signs (last day)       Date/Time Temp Temp src Pulse Resp BP Patient Position SpO2    07/17/25 1415 -- -- 68 15 -- -- --    07/17/25 1407 -- -- 74 15 -- -- 98    07/17/25 1108 -- -- 69 18 -- -- 98    07/17/25 1105 -- -- 64 18 -- -- 98    07/17/25 1041 -- -- 99 18 -- -- 100    07/17/25 1034 -- -- 68 18 -- -- 98    07/17/25 1000 -- -- 70 -- 103/55 -- 99    07/17/25 0900 -- -- 72 -- 103/52 -- 99    07/17/25 0800 -- -- 74 21 134/63 -- 97    07/17/25 0700 -- -- 71 -- 115/56 -- 96    07/17/25 0600 -- -- 67 -- 110/42 -- 95    07/17/25 0500 -- -- 67 -- 113/70 -- 96    07/17/25 0400 97.8 (36.6) Axillary 58 -- 111/62 Lying 96    07/17/25 0300 -- -- 65 -- 122/60 -- 97    07/17/25 0200 -- -- 64 -- 110/60 -- 96    07/17/25 0100 -- -- 63 -- 122/72 -- 96    07/17/25 0000 97.7 (36.5) Axillary 62 16 129/72 Lying 95    07/16/25 2300 -- -- 67 -- 134/54 -- 97    07/16/25 2200 -- -- 78 -- 142/87 -- 97    07/16/25 2100 -- -- 74 -- 132/106 -- 98    07/16/25 2000 97.7 (36.5) Oral 79 22 131/59 Lying 96    07/16/25 1900 -- -- 68 -- 131/67 -- 97    07/16/25 1645 -- -- 86 21 149/55 -- 98    07/16/25 1600 -- -- 84 -- -- -- 97    07/16/25 1500 -- -- 76 -- 141/47 -- 97    07/16/25 1445 98.1 (36.7) Oral 77 -- 158/69 -- 98    07/16/25 1400 -- -- 75 20 167/72 -- 98    07/16/25 1345 -- -- 79 -- 134/95 -- 98    07/16/25 13:14:34 98 (36.7) -- 81 17 -- -- 97    07/16/25 1244 -- -- 71 -- -- -- 97    07/16/25 1231 -- -- 74 -- 165/66 -- --    07/16/25 1230 -- -- 74 -- -- -- 97    07/16/25 1222 -- -- 78 -- 141/60 -- 97    07/16/25 1200 -- -- -- -- -- -- 96    07/16/25 1156 -- -- 83 -- -- -- 97    07/16/25 1146 -- -- 74 -- 146/66 -- 98    07/16/25 1101 -- -- 70 -- 158/71 --  97    07/16/25 1046 -- -- 74 -- 145/72 -- 98    07/16/25 1035 -- -- 77 -- -- -- 98    07/16/25 1031 -- -- 77 -- 162/84 -- 98    07/16/25 1029 -- -- 78 -- 171/72 -- 99    07/16/25 1020 97.6 (36.4) Oral 81 16 170/64 Sitting 97          Current Facility-Administered Medications   Medication Dose Route Frequency Provider Last Rate Last Admin    aspirin EC tablet 81 mg  81 mg Oral Daily Wilber Boggs APRN   81 mg at 07/17/25 0920    atorvastatin (LIPITOR) tablet 10 mg  10 mg Oral Nightly Wilber Boggs APRN   10 mg at 07/16/25 2122    sennosides-docusate (PERICOLACE) 8.6-50 MG per tablet 2 tablet  2 tablet Oral BID Wilber Boggs APRN   2 tablet at 07/17/25 0922    And    polyethylene glycol (MIRALAX) packet 17 g  17 g Oral Daily PRN Wilber Boggs APRN        And    bisacodyl (DULCOLAX) EC tablet 5 mg  5 mg Oral Daily PRN Wilber Boggs APRN        And    bisacodyl (DULCOLAX) suppository 10 mg  10 mg Rectal Daily PRN Wilber Boggs APRN        budesonide-formoterol (SYMBICORT) 160-4.5 MCG/ACT inhaler 2 puff  2 puff Inhalation BID - RT Wilber Boggs APRN   2 puff at 07/17/25 1105    And    ipratropium (ATROVENT) nebulizer solution 0.5 mg  0.5 mg Nebulization 4x Daily - RT Wilber Boggs APRN   0.5 mg at 07/17/25 1407    Calcium Replacement - Follow Nurse / BPA Driven Protocol   Not Applicable PRN Wilber Boggs APRN        Chlorhexidine Gluconate Cloth 2 % pads 1 Application  1 Application Topical Q24H Wilber Boggs APRN   1 Application at 07/17/25 0400    enoxaparin sodium (LOVENOX) syringe 30 mg  30 mg Subcutaneous Q24H Wilber Boggs APRN   30 mg at 07/16/25 1528    Magnesium Standard Dose Replacement - Follow Nurse / BPA Driven Protocol   Not Applicable PRN Wilber Boggs APRN        montelukast (SINGULAIR) tablet 10 mg  10 mg Oral Nightly Wilber Boggs APRN        mupirocin (BACTROBAN) 2 % nasal ointment 1 Application  1 Application Each Nare BID Wilber Boggs APRN   1  Application at 07/17/25 0921    nitroglycerin (NITROSTAT) SL tablet 0.4 mg  0.4 mg Sublingual Q5 Min PRN Wilber Boggs APRN        pantoprazole (PROTONIX) EC tablet 40 mg  40 mg Oral Daily Wilber Boggs APRN   40 mg at 07/17/25 0920    Phosphorus Replacement - Follow Nurse / BPA Driven Protocol   Not Applicable PRN Wilber Boggs APRN        Potassium Replacement - Follow Nurse / BPA Driven Protocol   Not Applicable PRN Wilber Boggs APRN        sertraline (ZOLOFT) tablet 50 mg  50 mg Oral Nightly Wilber Boggs APRN   50 mg at 07/16/25 2121    sodium chloride 0.9 % flush 10 mL  10 mL Intravenous PRN Dahlia Perera MD        sodium chloride 0.9 % flush 10 mL  10 mL Intravenous Q12H Wilber Boggs APRN   10 mL at 07/17/25 0921    sodium chloride 0.9 % flush 10 mL  10 mL Intravenous PRN Wilber Boggs APRN        sodium chloride 0.9 % infusion 40 mL  40 mL Intravenous PRN Wilber Boggs APRN        sodium chloride 0.9 % infusion  75 mL/hr Intravenous Continuous Wilber Aguilar MD 75 mL/hr at 07/17/25 1318 75 mL/hr at 07/17/25 1318     Lab Results (last 24 hours)       Procedure Component Value Units Date/Time    Sodium [[id number]]  (Abnormal) Collected: 07/17/25 1244    Specimen: Blood Updated: 07/17/25 1310     Sodium 117 mmol/L     Basic Metabolic Panel [[id number]]  (Abnormal) Collected: 07/17/25 0636    Specimen: Blood Updated: 07/17/25 0718     Glucose 92 mg/dL      BUN 7.4 mg/dL      Creatinine 0.65 mg/dL      Sodium 120 mmol/L      Potassium 4.1 mmol/L      Chloride 87 mmol/L      CO2 21.0 mmol/L      Calcium 8.7 mg/dL      BUN/Creatinine Ratio 11.4     Anion Gap 12.0 mmol/L      eGFR 91.4 mL/min/1.73     Narrative:      GFR Categories in Chronic Kidney Disease (CKD)              GFR Category          GFR (mL/min/1.73)    Interpretation  G1                    90 or greater        Normal or high (1)  G2                    60-89                Mild decrease (1)  G3a                    45-59                Mild to moderate decrease  G3b                   30-44                Moderate to severe decrease  G4                    15-29                Severe decrease  G5                    14 or less           Kidney failure    (1)In the absence of evidence of kidney disease, neither GFR category G1 or G2 fulfill the criteria for CKD.    eGFR calculation 2021 CKD-EPI creatinine equation, which does not include race as a factor    Magnesium [[id number]]  (Normal) Collected: 07/17/25 0636    Specimen: Blood Updated: 07/17/25 0718     Magnesium 1.8 mg/dL     Phosphorus [[id number]]  (Normal) Collected: 07/17/25 0636    Specimen: Blood Updated: 07/17/25 0715     Phosphorus 2.7 mg/dL     CBC & Differential [[id number]]  (Abnormal) Collected: 07/17/25 0636    Specimen: Blood Updated: 07/17/25 0658    Narrative:      The following orders were created for panel order CBC & Differential.  Procedure                               Abnormality         Status                     ---------                               -----------         ------                     CBC Auto Differential[[id number]]        Abnormal            Final result                 Please view results for these tests on the individual orders.    CBC Auto Differential [[id number]]  (Abnormal) Collected: 07/17/25 0636    Specimen: Blood Updated: 07/17/25 0658     WBC 6.28 10*3/mm3      RBC 3.50 10*6/mm3      Hemoglobin 10.0 g/dL      Hematocrit 28.7 %      MCV 82.0 fL      MCH 28.6 pg      MCHC 34.8 g/dL      RDW 12.8 %      RDW-SD 38.8 fl      MPV 9.2 fL      Platelets 304 10*3/mm3      Neutrophil % 40.8 %      Lymphocyte % 40.6 %      Monocyte % 15.6 %      Eosinophil % 1.9 %      Basophil % 0.8 %      Immature Grans % 0.3 %      Neutrophils, Absolute 2.56 10*3/mm3      Lymphocytes, Absolute 2.55 10*3/mm3      Monocytes, Absolute 0.98 10*3/mm3      Eosinophils, Absolute 0.12 10*3/mm3      Basophils, Absolute 0.05 10*3/mm3      Immature Grans,  Absolute 0.02 10*3/mm3      nRBC 0.0 /100 WBC     Sodium [[id number]]  (Abnormal) Collected: 07/16/25 2339    Specimen: Blood Updated: 07/16/25 2359     Sodium 121 mmol/L     Potassium [[id number]]  (Normal) Collected: 07/16/25 2109    Specimen: Blood Updated: 07/16/25 2152     Potassium 4.3 mmol/L     Sodium [[id number]]  (Abnormal) Collected: 07/16/25 2109    Specimen: Blood Updated: 07/16/25 2132     Sodium 122 mmol/L     Sodium [[id number]]  (Abnormal) Collected: 07/16/25 1619    Specimen: Blood Updated: 07/16/25 1657     Sodium 118 mmol/L              Physician Progress Notes (last 24 hours)        Wilber Aguilar MD at 07/17/25 0709              [facility]  INPATIENT PROGRESS NOTE    Patient Name: Leila BLANCHARD [facility]  Date of Admission: 7/16/2025  Today's Date: 07/17/25  Length of Stay: 1  Primary Care Physician: Kaylan Kowalski MD    Subjective     HPI   The patient is doing well this morning. Alert and interactive. No c/o. No vomiting overnight.    Objective    Temp:  [97.6 °F (36.4 °C)-98.1 °F (36.7 °C)] 97.8 °F (36.6 °C)  Heart Rate:  [58-86] 67  Resp:  [16-22] 16  BP: (110-171)/() 110/42  Physical Exam  NAD, AAOx3, hard of hearing  RRR no MRG  CTAB  Soft, NTND, NABS      Results Review:  Lab Results (last 24 hours)       Procedure Component Value Units Date/Time    CBC & Differential [[id number]]  (Abnormal) Collected: 07/17/25 0636    Specimen: Blood Updated: 07/17/25 0658    Narrative:      The following orders were created for panel order CBC & Differential.  Procedure                               Abnormality         Status                     ---------                               -----------         ------                     CBC Auto Differential[[id number]]        Abnormal            Final result                 Please view results for these tests on the individual orders.    CBC Auto Differential [[id number]]  (Abnormal) Collected: 07/17/25  0636    Specimen: Blood Updated: 07/17/25 0658     WBC 6.28 10*3/mm3      RBC 3.50 10*6/mm3      Hemoglobin 10.0 g/dL      Hematocrit 28.7 %      MCV 82.0 fL      MCH 28.6 pg      MCHC 34.8 g/dL      RDW 12.8 %      RDW-SD 38.8 fl      MPV 9.2 fL      Platelets 304 10*3/mm3      Neutrophil % 40.8 %      Lymphocyte % 40.6 %      Monocyte % 15.6 %      Eosinophil % 1.9 %      Basophil % 0.8 %      Immature Grans % 0.3 %      Neutrophils, Absolute 2.56 10*3/mm3      Lymphocytes, Absolute 2.55 10*3/mm3      Monocytes, Absolute 0.98 10*3/mm3      Eosinophils, Absolute 0.12 10*3/mm3      Basophils, Absolute 0.05 10*3/mm3      Immature Grans, Absolute 0.02 10*3/mm3      nRBC 0.0 /100 WBC     Basic Metabolic Panel [[id number]] Collected: 07/17/25 0636    Specimen: Blood Updated: 07/17/25 0649    Magnesium [[id number]] Collected: 07/17/25 0636    Specimen: Blood Updated: 07/17/25 0649    Phosphorus [[id number]] Collected: 07/17/25 0636    Specimen: Blood Updated: 07/17/25 0649    Sodium [[id number]]  (Abnormal) Collected: 07/16/25 2339    Specimen: Blood Updated: 07/16/25 2359     Sodium 121 mmol/L     Potassium [[id number]]  (Normal) Collected: 07/16/25 2109    Specimen: Blood Updated: 07/16/25 2152     Potassium 4.3 mmol/L     Sodium [[id number]]  (Abnormal) Collected: 07/16/25 2109    Specimen: Blood Updated: 07/16/25 2132     Sodium 122 mmol/L     Sodium [[id number]]  (Abnormal) Collected: 07/16/25 1619    Specimen: Blood Updated: 07/16/25 1657     Sodium 118 mmol/L     Osmolality, Urine - Urine, Clean Catch [[id number]]  (Normal) Collected: 07/16/25 1131    Specimen: Urine, Clean Catch Updated: 07/16/25 1347     Osmolality, Urine 272 mOsm/kg     Sodium, Urine, Random - Urine, Clean Catch [[id number]] Collected: 07/16/25 1131    Specimen: Urine, Clean Catch Updated: 07/16/25 1328     Sodium, Urine 50 mmol/L     Narrative:      Reference intervals for random urine have not been established.  Clinical usage is dependent upon  physician's interpretation in combination with other laboratory tests.       Osmolality, Serum [[id number]]  (Abnormal) Collected: 07/16/25 1037    Specimen: Blood from Arm, Left Updated: 07/16/25 1327     Osmolality 237 mOsm/kg     Urinalysis With Culture If Indicated - Urine, Clean Catch [[id number]]  (Abnormal) Collected: 07/16/25 1131    Specimen: Urine, Clean Catch Updated: 07/16/25 1141     Color, UA Yellow     Appearance, UA Clear     pH, UA 6.0     Specific Gravity, UA 1.016     Glucose, UA Negative     Ketones, UA Trace     Bilirubin, UA Negative     Blood, UA Negative     Protein, UA Negative     Leuk Esterase, UA Negative     Nitrite, UA Negative     Urobilinogen, UA 1.0 E.U./dL    Narrative:      In absence of clinical symptoms, the presence of pyuria, bacteria, and/or nitrites on the urinalysis result does not correlate with infection.  Urine microscopic not indicated.    Comprehensive Metabolic Panel [[id number]]  (Abnormal) Collected: 07/16/25 1037    Specimen: Blood from Arm, Left Updated: 07/16/25 1121     Glucose 120 mg/dL      BUN 10.0 mg/dL      Creatinine 0.74 mg/dL      Sodium 111 mmol/L      Potassium 3.2 mmol/L      Comment: Slight hemolysis detected by analyzer. Result may be falsely elevated.        Chloride 73 mmol/L      CO2 22.0 mmol/L      Calcium 9.5 mg/dL      Total Protein 8.0 g/dL      Albumin 4.7 g/dL      ALT (SGPT) 10 U/L      AST (SGOT) 30 U/L      Alkaline Phosphatase 64 U/L      Total Bilirubin 1.0 mg/dL      Globulin 3.3 gm/dL      A/G Ratio 1.4 g/dL      BUN/Creatinine Ratio 13.5     Anion Gap 16.0 mmol/L      eGFR 84.0 mL/min/1.73     Narrative:      GFR Categories in Chronic Kidney Disease (CKD)              GFR Category          GFR (mL/min/1.73)    Interpretation  G1                    90 or greater        Normal or high (1)  G2                    60-89                Mild decrease (1)  G3a                   45-59                Mild to moderate decrease  G3b                    30-44                Moderate to severe decrease  G4                    15-29                Severe decrease  G5                    14 or less           Kidney failure    (1)In the absence of evidence of kidney disease, neither GFR category G1 or G2 fulfill the criteria for CKD.    eGFR calculation 2021 CKD-EPI creatinine equation, which does not include race as a factor    Lipase [[id number]]  (Abnormal) Collected: 07/16/25 1037    Specimen: Blood from Arm, Left Updated: 07/16/25 1116     Lipase 68 U/L     CBC & Differential [[id number]]  (Abnormal) Collected: 07/16/25 1037    Specimen: Blood from Arm, Left Updated: 07/16/25 1105    Narrative:      The following orders were created for panel order CBC & Differential.  Procedure                               Abnormality         Status                     ---------                               -----------         ------                     CBC Auto Differential[[id number]]        Abnormal            Final result                 Please view results for these tests on the individual orders.    CBC Auto Differential [[id number]]  (Abnormal) Collected: 07/16/25 1037    Specimen: Blood from Arm, Left Updated: 07/16/25 1105     WBC 6.85 10*3/mm3      RBC 4.03 10*6/mm3      Hemoglobin 11.6 g/dL      Hematocrit 32.6 %      MCV 80.9 fL      MCH 28.8 pg      MCHC 35.6 g/dL      RDW 12.5 %      RDW-SD 36.8 fl      MPV 8.9 fL      Platelets 371 10*3/mm3      Neutrophil % 53.0 %      Lymphocyte % 34.6 %      Monocyte % 11.1 %      Eosinophil % 0.6 %      Basophil % 0.4 %      Immature Grans % 0.3 %      Neutrophils, Absolute 3.63 10*3/mm3      Lymphocytes, Absolute 2.37 10*3/mm3      Monocytes, Absolute 0.76 10*3/mm3      Eosinophils, Absolute 0.04 10*3/mm3      Basophils, Absolute 0.03 10*3/mm3      Immature Grans, Absolute 0.02 10*3/mm3      nRBC 0.0 /100 WBC     [place] Draw [[id number]] Collected: 07/16/25 1037    Specimen: Blood Updated: 07/16/25 1100    Narrative:      The  following orders were created for panel order [place] Draw.  Procedure                               Abnormality         Status                     ---------                               -----------         ------                     Green Top (Gel)[[id number]]                                  Final result               Lavender Top[[id number]]                                     Final result               Red Top[[id number]]                                          Final result               Gray Top[[id number]]                                         Final result               Light Blue Top[[id number]]                                   Final result                 Please view results for these tests on the individual orders.    Green Top (Gel) [[id number]] Collected: 07/16/25 1037    Specimen: Blood from Arm, Left Updated: 07/16/25 1100     Extra Tube Hold for add-ons.     Comment: Auto resulted.       Lavender Top [[id number]] Collected: 07/16/25 1037    Specimen: Blood from Arm, Left Updated: 07/16/25 1100     Extra Tube hold for add-on     Comment: Auto resulted       Red Top [[id number]] Collected: 07/16/25 1037    Specimen: Blood from Arm, Left Updated: 07/16/25 1100     Extra Tube Hold for add-ons.     Comment: Auto resulted.       Gray Top [[id number]] Collected: 07/16/25 1037    Specimen: Blood from Arm, Left Updated: 07/16/25 1100     Extra Tube Hold for add-ons.     Comment: Auto resulted.       Light Blue Top [[id number]] Collected: 07/16/25 1037    Specimen: Blood Updated: 07/16/25 1100     Extra Tube Hold for add-ons.     Comment: Auto resulted                XR Chest 1 View  Result Date: 7/16/2025  1. Chronic lung changes. 2. No acute infiltrate.      This report was signed and finalized on 7/16/2025 11:48 AM by Dr. Romeo Agrawal MD.      CT Abdomen Pelvis With Contrast  Result Date: 7/16/2025  1. An area of circumferential wall thickening and narrowing of the ascending colon adjacent and distal to the cecum  may represent an inflammatory/infectious process. No obstruction. This may partly be due to incomplete distention. Further follow-up may be obtained. 2. A long segment of circumferential thickening of the wall of the descending colon with mild surrounding peritoneal infiltration may represent acute nonspecific colitis?. This may partly be due to incomplete distention. 3. Diverticulosis of the colon. No finding to suggest diverticulitis. 4. The appendix is not optimally visualized. There are no findings in the expected area of the appendix to suggest appendicitis. 5. Other nonacute findings similar to the previous study.               This report was signed and finalized on 7/16/2025 11:47 AM by Dr. Deisy Bolaños MD.        Result Review:  I have personally reviewed the results from the time of this admission to 7/17/2025 07:09 CDT and agree with these findings:  [x]  Laboratory list / accordion  []  Microbiology  []  Radiology  []  EKG/Telemetry   []  Cardiology/Vascular   []  Pathology  []  Old records  []  Other:    Assessment/Plan   Assessment  Active Hospital Problems    Diagnosis     **Hyponatremia        Treatment Plan  The patient is a 75yo woman with a history of HTN who had recently been started on HCTZ who was admitted on 7/16 for hypoNa (Na 111 on presentation). The patient has had no neurologic deficits related to her hypoNa, and her Na is correcting appropriately.    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  -Na corrected from 111 to 122 overnight, so NS was stopped and she was given D5W and desmopressin. Na this morning is pending.  -Will decide on whether to restart NS depending on Na result this morning.  -HCTZ d/c'ed    2. NV  -The patient had 3-4 days of vomiting prior to admission. No vomiting since admission.  -Follow    CCT 35 min    Electronically signed by Wilber Aguilar MD on 7/17/2025 at 07:09  CDT     Electronically signed by Wilber Aguilar MD at 07/17/25 0718         7/16  sodium  111   118   122 121  7/17  sodium 120    117

## 2025-07-17 NOTE — PROGRESS NOTES
Nutrition Services      Patient Name: Leila Rubin  YOB: 1950  MRN: [id number]  Admission date: 7/16/2025  Reason for Encounter: Low BMI and MST 2-3 or Nursing Admission Screen    [facility] Clinical Nutrition Assessment     Subjective    Subjective Information     7/17:  Initial nutrition assessment secondary to low BMI and MST of 2 per nurse admission screen. She is admitted with hyponatremia likely secondary to multiple episodes of N/V over the last several days, recently started on HCTZ, and possible development of SIADH. Visited pt's room at lunch time. She was eating some of her lunch. She was still a little confused and some of her answers were not appropriate and she could not remember some things. She does say her appetite is poor and she says her appetite has been poor for a long time. She is unsure if she has had any recent weight loss. She is underweight with a BMI of 15.17. Per review of weight records, it does not appear she has had any sig weight changes over the last year when viewing MD office visit notes. She has dentures and denies chewing or swallowing difficulty. Her family reported to staff on admission, that she has not been eating or drinking for the last several days with N/V, but she does not eat or drink much at her baseline. Her last BM was 7/16 and she reports her BM's have been smaller than usual. She reports she has tried supplements in the past and likes chocolate. She says she drinks 1 per day. NFPE performed. She qualifies for severe malnutrition in the context of chronic disease per AND/ASPEN guidelines, see MSA below for further details.  Advised her of alternate food selections and obtained specific food preferences.  Recommendation:  Will start chocolate Boost Original BID (Provides 480 kcals, 20 g protein if consumed)  . Pt in agreement.      Objective   H&P and Current Problems      H&P  Past Medical History:   Diagnosis Date    Bronchiectasis without  "complication 4/17/2023    GERD without esophagitis 4/17/2023    Hiatal hernia 4/17/2023    History of environmental allergies 4/17/2023    Multiple lung nodules on CT 4/17/2023    Personal history of nicotine dependence 4/17/2023      Past Surgical History:   Procedure Laterality Date    COLONOSCOPY  03/10/2014    Tics otherwise normal exam repeat in 5 years    ENDOSCOPY  01/15/2013    Moderate chronic active inflammation negative for Intestinal Metaplasia    EXCISION / BIOPSY BREAST / NIPPLE / DUCT Left 2015    benign    HYSTERECTOMY      OOPHORECTOMY        Current Problems   Admission Diagnosis:  Hyponatremia [E87.1]    Problem List:    Hyponatremia      Other Applicable Nutrition Information:   Not a bit eater at baseline     Anthropometrics       Height: 162.6 cm (64\")  Weight: 40.1 kg (88 lb 6.5 oz) (07/16/25 1445)  Weight Method: Bed scale  BMI (Calculated): 15.2       Trending Weight Changes 07/17/25: No sig weight changes in the last year, but underweight with BMI of 15.17       Weight History  Wt Readings from Last 20 Encounters:   07/16/25 1445 40.1 kg (88 lb 6.5 oz)   07/16/25 1020 37.9 kg (83 lb 8 oz)   06/15/23 1500 38.1 kg (84 lb)   05/02/23 1324 37.6 kg (83 lb)            Labs      Comment: Na+ improving     Results from last 7 days   Lab Units 07/17/25  1244 07/17/25  0636 07/16/25  2339 07/16/25  2109 07/16/25  1619 07/16/25  1037   SODIUM mmol/L 117* 120* 121* 122*   < > 111*   POTASSIUM mmol/L  --  4.1  --  4.3  --  3.2*   GLUCOSE mg/dL  --  92  --   --   --  120*   BUN mg/dL  --  7.4*  --   --   --  10.0   CREATININE mg/dL  --  0.65  --   --   --  0.74   CALCIUM mg/dL  --  8.7  --   --   --  9.5   PHOSPHORUS mg/dL  --  2.7  --   --   --   --    MAGNESIUM mg/dL  --  1.8  --   --   --   --    ALBUMIN g/dL  --   --   --   --   --  4.7   BILIRUBIN mg/dL  --   --   --   --   --  1.0   ALK PHOS U/L  --   --   --   --   --  64   AST (SGOT) U/L  --   --   --   --   --  30   ALT (SGPT) U/L  --   --   --  "  --   --  10    < > = values in this interval not displayed.     Results from last 7 days   Lab Units 07/17/25  0636 07/16/25  1037   PLATELETS 10*3/mm3 304 371   HEMOGLOBIN g/dL 10.0* 11.6*   HEMATOCRIT % 28.7* 32.6*     Lab Results   Component Value Date    HGBA1C 5.4 02/24/2023          Medications       Scheduled Medications aspirin, 81 mg, Oral, Daily  atorvastatin, 10 mg, Oral, Nightly  budesonide-formoterol, 2 puff, Inhalation, BID - RT   And  ipratropium, 0.5 mg, Nebulization, 4x Daily - RT  Chlorhexidine Gluconate Cloth, 1 Application, Topical, Q24H  enoxaparin sodium, 30 mg, Subcutaneous, Q24H  montelukast, 10 mg, Oral, Nightly  mupirocin, 1 Application, Each Nare, BID  pantoprazole, 40 mg, Oral, Daily  senna-docusate sodium, 2 tablet, Oral, BID  sertraline, 50 mg, Oral, Nightly  sodium chloride, 10 mL, Intravenous, Q12H        Infusions sodium chloride, 75 mL/hr, Last Rate: 75 mL/hr (07/17/25 1318)        PRN Medications   senna-docusate sodium **AND** polyethylene glycol **AND** bisacodyl **AND** bisacodyl    Calcium Replacement - Follow Nurse / BPA Driven Protocol    Magnesium Standard Dose Replacement - Follow Nurse / BPA Driven Protocol    nitroglycerin    Phosphorus Replacement - Follow Nurse / BPA Driven Protocol    Potassium Replacement - Follow Nurse / BPA Driven Protocol    [COMPLETED] Insert Peripheral IV **AND** sodium chloride    sodium chloride    sodium chloride     Physical Findings      Chewing/Swallowing  Teeth Status: Mouth/Teeth WDL: .WDL except, teeth Teeth Symptoms: dental appliance present, tooth/teeth missing  Chewing/Swallowing Issues: No issues identified at this time   Edema                            Bowel Function     Last Bowel Movement: 07/16/25   I/Os  Intake & Output (last 3 days)         07/14 0701  07/15 0700 07/15 0701 07/16 0700 07/16 0701 07/17 0700 07/17 0701 07/18 0700    Urine (mL/kg/hr)   2350     Total Output   2350     Net   -2350                    Lines,  Drains, Airways, & Wounds       Active LDAs       Name Placement date Placement time Site Days Last dressing change    Peripheral IV 07/16/25 1036 20 G Left Antecubital 07/16/25  1036  Antecubital  1     Peripheral IV 07/16/25 1219 20 G Anterior;Left Wrist 07/16/25  1219  Wrist  1 07/16/25 2000 (17.95 hrs)    External Urinary Catheter 07/16/25  1200  --  1                       Nutrition Focused Physical Exam     Trending NFPE 07/17/25:NFPE-moderate-severe muscle/fat wasting-see below     Malnutrition Severity Assessment      Patient meets criteria for : Severe Malnutrition  Malnutrition Type (Last 8 Hours)       Malnutrition Severity Assessment       Row Name 07/17/25 1346       Malnutrition Severity Assessment    Malnutrition Type Chronic Disease - Related Malnutrition      Row Name 07/17/25 1346       Insufficient Energy Intake     Insufficient Energy Intake Findings Severe    Insufficient Energy Intake  <75% of est. energy requirement for > or equal to 1 month      Row Name 07/17/25 1346       Unintentional Weight Loss     Unintentional Weight Loss Findings None  Pt is underweight with a BMI of 15.17. No sig weight changes noted over the last year.      Row Name 07/17/25 1346       Muscle Loss    Loss of Muscle Mass Findings Severe    Shinto Region Moderate - slight depression    Clavicle Bone Region Severe - protruding prominent bone    Acromion Bone Region Severe - squared shoulders, bones, and acromion process protrusion prominent    Dorsal Hand Region Moderate - slight depression    Patellar Region Moderate - patella more prominent, less muscle definition around patella    Anterior Thigh Region Severe - line/depression along thigh, obviously thin  Did not remove SCD's    Posterior Calf Region Severe - thin with very little definition/firmness      Row Name 07/17/25 1346       Fat Loss    Subcutaneous Fat Loss Findings Severe    Orbital Region  Severe - pronounced hollowness/depression, dark circles, loose  saggy skin    Upper Arm Region Severe - mostly skin, very little space between folds, fingers touch    Thoracic & Lumbar Region Moderate - ribs visible with mild depressions, iliac crest somewhat prominent      Row Name 07/17/25 1346       Criteria Met (Must meet criteria for severity in at least 2 of these categories: M Wasting, Fat Loss, Fluid, Secondary Signs, Wt. Status, Intake)    Patient meets criteria for  Severe Malnutrition                     Estimated Needs      Energy Requirements    Weight for Calculation 40.1kg   Method for Estimation  30-35 kcals/kg   Daily Needs (kcal/day) 7548-3704   Protein Requirements    Weight for Calculation 40.1kg   Method for Estimation 1.5 gm/kg   Daily Needs (g/day) 60   Fluid Requirements     Method for Estimation 1 mL/kcal    Daily Needs (mL/day) 1637-1288     Current Nutrition Orders & Evaluation of Intake      Oral Nutrition     Food Allergies  and Intolerances NKFA   Current PO Diet Diet: Regular/House; Fluid Consistency: Thin (IDDSI 0)   Oral Nutrition Supplement None     Trending % PO Intake 07/17/25:insufficient data     Enteral Nutrition     Current EN Order Patient isn't on Tube Feeding  Patient doesn't have any tube feeding modular orders     EN Route      EN Tolerance     EN Observation/Intake         Parenteral Nutrition     Current TPN Order    TPN Route    Lipids (mL/%/frequency)     Total # Days on TPN    TPN Observation/Intake       Assessment & Plan   Nutrition Diagnosis and Goals       Nutrition Diagnosis 1 Severe chronic disease or condition related malnutrition r/t chronic poor appetite worsened with current condition AEB BMI 15.17, mucle/fat wasting per NFPE, little to no intake the last several days secondary to N/V, never a big eater per family report.        Goal(s) Establish PO Intake, Average Meal Intake at Least 50%, Meets Estimated Needs , Accepts Oral Nutrition Supplement, and Weight goal of gain 0.5#-1#/week     Nutrition Intervention and  Prescription       Intervention  Start oral nutrition supplement, Obtain food preferences, Advised alternate menu selections, Continue to monitor for plan of care, and Completed NFPE      Diet Prescription     Supplement Prescription Start chocolate Boost Original BID (Provides 480 kcals, 20 g protein if consumed)  with breakfast and dinner.    Education Provided       Enteral Prescription        TPN Prescription      Monitoring/Evaluation       Monitor/Evaluation Per Protocol     RD Follow-Up Encounter 3-5 days     Electronically signed by:  Morenita Patricia RDN, SALEEM  07/17/25 13:56 CDT

## 2025-07-17 NOTE — PLAN OF CARE
Problem: Adult Inpatient Plan of Care  Goal: Absence of Hospital-Acquired Illness or Injury  Intervention: Identify and Manage Fall Risk  Recent Flowsheet Documentation  Taken 7/17/2025 0600 by Tari Snowden RN  Safety Promotion/Fall Prevention: safety round/check completed  Taken 7/17/2025 0500 by Tari Snowden RN  Safety Promotion/Fall Prevention: safety round/check completed  Taken 7/17/2025 0400 by Tari Snowden RN  Safety Promotion/Fall Prevention: safety round/check completed  Taken 7/17/2025 0300 by Tari Snowden RN  Safety Promotion/Fall Prevention: safety round/check completed  Taken 7/17/2025 0200 by Tari Snowden RN  Safety Promotion/Fall Prevention: safety round/check completed  Taken 7/17/2025 0100 by Tari Snowden RN  Safety Promotion/Fall Prevention: safety round/check completed  Taken 7/17/2025 0000 by Tari Snowden RN  Safety Promotion/Fall Prevention: safety round/check completed  Taken 7/16/2025 2300 by Tari Snowden RN  Safety Promotion/Fall Prevention: safety round/check completed  Taken 7/16/2025 2200 by Tari Snowden RN  Safety Promotion/Fall Prevention: safety round/check completed  Taken 7/16/2025 2100 by Tari Snowden RN  Safety Promotion/Fall Prevention: safety round/check completed  Taken 7/16/2025 2000 by Tari Snowden RN  Safety Promotion/Fall Prevention: safety round/check completed  Taken 7/16/2025 1900 by Tari Snowden RN  Safety Promotion/Fall Prevention: safety round/check completed  Intervention: Prevent Skin Injury  Recent Flowsheet Documentation  Taken 7/17/2025 0500 by Tari Snowden RN  Body Position: position changed independently  Taken 7/17/2025 0400 by Tari Snowden RN  Skin Protection:   incontinence pads utilized   transparent dressing maintained  Taken 7/17/2025 0300 by Tari Snowden RN  Body Position: position changed independently  Taken 7/17/2025 0100 by Tari Snowden RN  Body Position: position changed  independently  Taken 7/17/2025 0000 by Tari Snowden RN  Body Position: position changed independently  Skin Protection: incontinence pads utilized  Taken 7/16/2025 2300 by Tari Snowden RN  Body Position: position changed independently  Taken 7/16/2025 2100 by Tari Snowden RN  Body Position: position changed independently  Taken 7/16/2025 2000 by Tari Snowden RN  Body Position: position changed independently  Skin Protection: incontinence pads utilized  Taken 7/16/2025 1900 by Tari Snowden RN  Body Position: position changed independently  Intervention: Prevent and Manage VTE (Venous Thromboembolism) Risk  Recent Flowsheet Documentation  Taken 7/16/2025 2000 by Tari Snowden RN  VTE Prevention/Management: (see mar) other (see comments)  Intervention: Prevent Infection  Recent Flowsheet Documentation  Taken 7/17/2025 0400 by Tari Snowden RN  Infection Prevention: rest/sleep promoted  Taken 7/16/2025 2000 by Tari Snowden RN  Infection Prevention: rest/sleep promoted  Goal: Optimal Comfort and Wellbeing  Intervention: Provide Person-Centered Care  Recent Flowsheet Documentation  Taken 7/16/2025 2000 by Tari Snowden RN  Trust Relationship/Rapport:   care explained   reassurance provided     Problem: Skin Injury Risk Increased  Goal: Skin Health and Integrity  Intervention: Optimize Skin Protection  Recent Flowsheet Documentation  Taken 7/17/2025 0500 by Tari Snowden RN  Head of Bed (HOB) Positioning: HOB elevated  Taken 7/17/2025 0400 by Tari Snowden RN  Activity Management: bedrest  Pressure Reduction Techniques: frequent weight shift encouraged  Pressure Reduction Devices:   specialty bed utilized   pressure-redistributing mattress utilized   positioning supports utilized  Skin Protection:   incontinence pads utilized   transparent dressing maintained  Taken 7/17/2025 0300 by Tari Snowden RN  Head of Bed (HOB) Positioning: HOB elevated  Taken 7/17/2025 0100 by Isi  STEPHEN Marc  Head of Bed ([facility]) Positioning: HOB elevated  Taken 7/17/2025 0000 by Tari Snowden RN  Pressure Reduction Techniques: frequent weight shift encouraged  Head of Bed ([facility]) Positioning: [facility] elevated  Pressure Reduction Devices:   specialty bed utilized   pressure-redistributing mattress utilized   positioning supports utilized  Skin Protection: incontinence pads utilized  Taken 7/16/2025 2300 by Tari Snowden RN  Head of Bed ([facility]) Positioning: HOB elevated  Taken 7/16/2025 2100 by Tari Snowden RN  Head of Bed ([facility]) Positioning: HOB elevated  Taken 7/16/2025 2000 by Tari Snowden RN  Activity Management: bedrest  Pressure Reduction Techniques: heels elevated off bed  Head of Bed ([facility]) Positioning: [facility] elevated  Pressure Reduction Devices:   specialty bed utilized   pressure-redistributing mattress utilized   positioning supports utilized   heel offloading device utilized   alternating pressure pump (DENNIS)  Skin Protection: incontinence pads utilized  Taken 7/16/2025 1900 by Tari Snowden RN  Head of Bed ([facility]) Positioning: [facility] elevated   Goal Outcome Evaluation:

## 2025-07-18 ENCOUNTER — TELEPHONE (OUTPATIENT)
Dept: INTERNAL MEDICINE | Age: 75
End: 2025-07-18

## 2025-07-18 LAB
ANION GAP SERPL CALCULATED.3IONS-SCNC: 12 MMOL/L (ref 5–15)
BASOPHILS # BLD AUTO: 0.04 10*3/MM3 (ref 0–0.2)
BASOPHILS NFR BLD AUTO: 0.6 % (ref 0–1.5)
BUN SERPL-MCNC: 6.9 MG/DL (ref 8–23)
BUN/CREAT SERPL: 12.8 (ref 7–25)
CALCIUM SPEC-SCNC: 8.2 MG/DL (ref 8.6–10.5)
CHLORIDE SERPL-SCNC: 83 MMOL/L (ref 98–107)
CO2 SERPL-SCNC: 18 MMOL/L (ref 22–29)
CREAT SERPL-MCNC: 0.54 MG/DL (ref 0.57–1)
DEPRECATED RDW RBC AUTO: 38.8 FL (ref 37–54)
EGFRCR SERPLBLD CKD-EPI 2021: 95.6 ML/MIN/1.73
EOSINOPHIL # BLD AUTO: 0.16 10*3/MM3 (ref 0–0.4)
EOSINOPHIL NFR BLD AUTO: 2.3 % (ref 0.3–6.2)
ERYTHROCYTE [DISTWIDTH] IN BLOOD BY AUTOMATED COUNT: 12.8 % (ref 12.3–15.4)
GLUCOSE SERPL-MCNC: 110 MG/DL (ref 65–99)
HCT VFR BLD AUTO: 30 % (ref 34–46.6)
HGB BLD-MCNC: 10.1 G/DL (ref 12–15.9)
IMM GRANULOCYTES # BLD AUTO: 0.05 10*3/MM3 (ref 0–0.05)
IMM GRANULOCYTES NFR BLD AUTO: 0.7 % (ref 0–0.5)
LYMPHOCYTES # BLD AUTO: 2.53 10*3/MM3 (ref 0.7–3.1)
LYMPHOCYTES NFR BLD AUTO: 35.9 % (ref 19.6–45.3)
MAGNESIUM SERPL-MCNC: 1.7 MG/DL (ref 1.6–2.4)
MCH RBC QN AUTO: 28.6 PG (ref 26.6–33)
MCHC RBC AUTO-ENTMCNC: 33.7 G/DL (ref 31.5–35.7)
MCV RBC AUTO: 85 FL (ref 79–97)
MONOCYTES # BLD AUTO: 0.69 10*3/MM3 (ref 0.1–0.9)
MONOCYTES NFR BLD AUTO: 9.8 % (ref 5–12)
NEUTROPHILS NFR BLD AUTO: 3.57 10*3/MM3 (ref 1.7–7)
NEUTROPHILS NFR BLD AUTO: 50.7 % (ref 42.7–76)
NRBC BLD AUTO-RTO: 0 /100 WBC (ref 0–0.2)
PHOSPHATE SERPL-MCNC: 2.7 MG/DL (ref 2.5–4.5)
PLATELET # BLD AUTO: 267 10*3/MM3 (ref 140–450)
PMV BLD AUTO: 9.3 FL (ref 6–12)
POTASSIUM SERPL-SCNC: 3.5 MMOL/L (ref 3.5–5.2)
POTASSIUM SERPL-SCNC: 4 MMOL/L (ref 3.5–5.2)
RBC # BLD AUTO: 3.53 10*6/MM3 (ref 3.77–5.28)
SODIUM SERPL-SCNC: 113 MMOL/L (ref 136–145)
SODIUM SERPL-SCNC: 114 MMOL/L (ref 136–145)
SODIUM SERPL-SCNC: 114 MMOL/L (ref 136–145)
SODIUM SERPL-SCNC: 115 MMOL/L (ref 136–145)
SODIUM SERPL-SCNC: 116 MMOL/L (ref 136–145)
SODIUM SERPL-SCNC: 120 MMOL/L (ref 136–145)
WBC NRBC COR # BLD AUTO: 7.04 10*3/MM3 (ref 3.4–10.8)

## 2025-07-18 PROCEDURE — 85025 COMPLETE CBC W/AUTO DIFF WBC: CPT | Performed by: NURSE PRACTITIONER

## 2025-07-18 PROCEDURE — 36415 COLL VENOUS BLD VENIPUNCTURE: CPT | Performed by: NURSE PRACTITIONER

## 2025-07-18 PROCEDURE — 94799 UNLISTED PULMONARY SVC/PX: CPT

## 2025-07-18 PROCEDURE — 84100 ASSAY OF PHOSPHORUS: CPT | Performed by: NURSE PRACTITIONER

## 2025-07-18 PROCEDURE — 84132 ASSAY OF SERUM POTASSIUM: CPT | Performed by: EMERGENCY MEDICINE

## 2025-07-18 PROCEDURE — 25810000003 SODIUM CHLORIDE 3 % SOLUTION: Performed by: NURSE PRACTITIONER

## 2025-07-18 PROCEDURE — 25810000003 SODIUM CHLORIDE 0.9 % SOLUTION: Performed by: NURSE PRACTITIONER

## 2025-07-18 PROCEDURE — 83735 ASSAY OF MAGNESIUM: CPT | Performed by: NURSE PRACTITIONER

## 2025-07-18 PROCEDURE — 84295 ASSAY OF SERUM SODIUM: CPT | Performed by: NURSE PRACTITIONER

## 2025-07-18 PROCEDURE — 80048 BASIC METABOLIC PNL TOTAL CA: CPT | Performed by: NURSE PRACTITIONER

## 2025-07-18 PROCEDURE — 25010000002 ENOXAPARIN PER 10 MG: Performed by: NURSE PRACTITIONER

## 2025-07-18 PROCEDURE — 94761 N-INVAS EAR/PLS OXIMETRY MLT: CPT

## 2025-07-18 PROCEDURE — 36415 COLL VENOUS BLD VENIPUNCTURE: CPT | Performed by: EMERGENCY MEDICINE

## 2025-07-18 PROCEDURE — 97166 OT EVAL MOD COMPLEX 45 MIN: CPT | Performed by: OCCUPATIONAL THERAPIST

## 2025-07-18 PROCEDURE — 97162 PT EVAL MOD COMPLEX 30 MIN: CPT

## 2025-07-18 PROCEDURE — 25810000003 SODIUM CHLORIDE 3 % SOLUTION: Performed by: STUDENT IN AN ORGANIZED HEALTH CARE EDUCATION/TRAINING PROGRAM

## 2025-07-18 PROCEDURE — 84295 ASSAY OF SERUM SODIUM: CPT | Performed by: STUDENT IN AN ORGANIZED HEALTH CARE EDUCATION/TRAINING PROGRAM

## 2025-07-18 RX ORDER — BUDESONIDE AND FORMOTEROL FUMARATE DIHYDRATE 160; 4.5 UG/1; UG/1
2 AEROSOL RESPIRATORY (INHALATION)
Status: DISCONTINUED | OUTPATIENT
Start: 2025-07-18 | End: 2025-07-20 | Stop reason: HOSPADM

## 2025-07-18 RX ORDER — HYDROXYZINE HYDROCHLORIDE 25 MG/1
25 TABLET, FILM COATED ORAL EVERY 6 HOURS PRN
Status: DISCONTINUED | OUTPATIENT
Start: 2025-07-18 | End: 2025-07-20 | Stop reason: HOSPADM

## 2025-07-18 RX ORDER — 3% SODIUM CHLORIDE 3 G/100ML
30 INJECTION, SOLUTION INTRAVENOUS CONTINUOUS
Status: DISCONTINUED | OUTPATIENT
Start: 2025-07-18 | End: 2025-07-18

## 2025-07-18 RX ORDER — 3% SODIUM CHLORIDE 3 G/100ML
30 INJECTION, SOLUTION INTRAVENOUS CONTINUOUS
Status: DISPENSED | OUTPATIENT
Start: 2025-07-18 | End: 2025-07-19

## 2025-07-18 RX ORDER — 3% SODIUM CHLORIDE 3 G/100ML
30 INJECTION, SOLUTION INTRAVENOUS CONTINUOUS
Status: DISPENSED | OUTPATIENT
Start: 2025-07-18 | End: 2025-07-18

## 2025-07-18 RX ORDER — SODIUM CHLORIDE 9 MG/ML
100 INJECTION, SOLUTION INTRAVENOUS CONTINUOUS
Status: DISCONTINUED | OUTPATIENT
Start: 2025-07-18 | End: 2025-07-18

## 2025-07-18 RX ORDER — POTASSIUM CHLORIDE 1500 MG/1
40 TABLET, EXTENDED RELEASE ORAL EVERY 4 HOURS
Status: DISCONTINUED | OUTPATIENT
Start: 2025-07-19 | End: 2025-07-19

## 2025-07-18 RX ADMIN — SODIUM CHLORIDE 30 ML/HR: 3 INJECTION, SOLUTION INTRAVENOUS at 11:13

## 2025-07-18 RX ADMIN — BUDESONIDE AND FORMOTEROL FUMARATE DIHYDRATE 2 PUFF: 160; 4.5 AEROSOL RESPIRATORY (INHALATION) at 18:33

## 2025-07-18 RX ADMIN — SODIUM CHLORIDE 100 ML/HR: 9 INJECTION, SOLUTION INTRAVENOUS at 07:35

## 2025-07-18 RX ADMIN — Medication 10 ML: at 08:20

## 2025-07-18 RX ADMIN — BUDESONIDE AND FORMOTEROL FUMARATE DIHYDRATE 2 PUFF: 160; 4.5 AEROSOL RESPIRATORY (INHALATION) at 07:00

## 2025-07-18 RX ADMIN — ATORVASTATIN CALCIUM 10 MG: 10 TABLET, FILM COATED ORAL at 21:27

## 2025-07-18 RX ADMIN — PANTOPRAZOLE SODIUM 40 MG: 40 TABLET, DELAYED RELEASE ORAL at 08:19

## 2025-07-18 RX ADMIN — Medication 10 ML: at 21:27

## 2025-07-18 RX ADMIN — Medication 1 APPLICATION: at 21:27

## 2025-07-18 RX ADMIN — SODIUM CHLORIDE 100 ML/HR: 9 INJECTION, SOLUTION INTRAVENOUS at 05:13

## 2025-07-18 RX ADMIN — IPRATROPIUM BROMIDE 0.5 MG: 0.5 SOLUTION RESPIRATORY (INHALATION) at 07:00

## 2025-07-18 RX ADMIN — SODIUM CHLORIDE 30 ML/HR: 3 INJECTION, SOLUTION INTRAVENOUS at 05:23

## 2025-07-18 RX ADMIN — CHLORHEXIDINE GLUCONATE 1 APPLICATION: 500 CLOTH TOPICAL at 04:36

## 2025-07-18 RX ADMIN — Medication 1 APPLICATION: at 08:19

## 2025-07-18 RX ADMIN — HYDROXYZINE HYDROCHLORIDE 25 MG: 25 TABLET, FILM COATED ORAL at 10:09

## 2025-07-18 RX ADMIN — SODIUM CHLORIDE 30 ML/HR: 3 INJECTION, SOLUTION INTRAVENOUS at 18:04

## 2025-07-18 RX ADMIN — ENOXAPARIN SODIUM 30 MG: 100 INJECTION SUBCUTANEOUS at 14:26

## 2025-07-18 RX ADMIN — SERTRALINE HYDROCHLORIDE 50 MG: 100 TABLET, FILM COATED ORAL at 21:26

## 2025-07-18 RX ADMIN — ASPIRIN 81 MG: 81 TABLET, COATED ORAL at 08:19

## 2025-07-18 RX ADMIN — MONTELUKAST 10 MG: 10 TABLET, FILM COATED ORAL at 21:27

## 2025-07-18 RX ADMIN — DOCUSATE SODIUM 50 MG AND SENNOSIDES 8.6 MG 2 TABLET: 8.6; 5 TABLET, FILM COATED ORAL at 21:26

## 2025-07-18 RX ADMIN — HYDROXYZINE HYDROCHLORIDE 25 MG: 25 TABLET, FILM COATED ORAL at 21:26

## 2025-07-18 NOTE — THERAPY EVALUATION
Patient Name: Leila Rubin  : 1950    MRN: [id number]                              Today's Date: 2025       Admit Date: 2025    Visit Dx:     ICD-10-CM ICD-9-CM   1. Nausea and vomiting, unspecified vomiting type  R11.2 787.01   2. Hyponatremia  E87.1 276.1   3. Hypochloremia  E87.8 276.9   4. Hypokalemia  E87.6 276.8   5. Colitis  K52.9 558.9   6. Impaired mobility [Z74.09]  Z74.09 799.89     Patient Active Problem List   Diagnosis    Bronchiectasis without complication    Personal history of nicotine dependence    Hiatal hernia    GERD without esophagitis    History of environmental allergies    Hyponatremia    Severe protein-calorie malnutrition     Past Medical History:   Diagnosis Date    Bronchiectasis without complication 2023    GERD without esophagitis 2023    Hiatal hernia 2023    History of environmental allergies 2023    Multiple lung nodules on CT 2023    Personal history of nicotine dependence 2023     Past Surgical History:   Procedure Laterality Date    COLONOSCOPY  03/10/2014    Tics otherwise normal exam repeat in 5 years    ENDOSCOPY  01/15/2013    Moderate chronic active inflammation negative for Intestinal Metaplasia    EXCISION / BIOPSY BREAST / NIPPLE / DUCT Left     benign    HYSTERECTOMY      OOPHORECTOMY        General Information       Row Name 25 1415          Physical Therapy Time and Intention    Document Type evaluation  Pt presented to ED with severe weakness, nausea, and vomiting. Hx: HTN, hypothyroidism, bronchiectasis. Dx: hyponatremia  -MARTY (r) GM (t) MARTY (c)     Mode of Treatment physical therapy  -MARTY (r) GM (t) MARTY (c)       Row Name 25 1415          General Information    Patient Profile Reviewed yes  -MARTY (r) GM (t) MARTY (c)     Prior Level of Function independent:;ADL's;gait;all household mobility;using stairs;bathing;dressing  -MARTY (r) GM (t) MARTY (c)     Existing Precautions/Restrictions fall  -MARTY (r) GM (t) MARTY (c)  "    Barriers to Rehab medically complex;hearing deficit;cognitive status  -MARTY (r) GM (t) MARTY (c)       [facility] Name 07/18/25 1415          Living Environment    Current Living Arrangements home  -MARTY (r) GM (t) MARTY (c)     People in Home alone  -MARTY (r) GM (t) MARTY (c)       Row Name 07/18/25 1415          Home Main Entrance    Number of Stairs, Main Entrance none  -MARTY (r) GM (t) MARTY (c)       Row Name 07/18/25 1415          Stairs Within Home, Primary    Number of Stairs, Within Home, Primary none  -MARTY (r) GM (t) MARTY (c)       Row Name 07/18/25 1415          Cognition    Orientation Status (Cognition) oriented to;person;place;disoriented to;situation;time  when asked what year it was, pt said \"1925\"  -MARTY (r) GM (t) MARTY (c)       Row Name 07/18/25 1415          Safety Issues/Impairments Affecting Functional Mobility    Safety Issues Affecting Function (Mobility) safety precaution awareness;impulsivity;problem-solving  -MARTY (r) GM (t) MARTY (c)     Impairments Affecting Function (Mobility) balance;endurance/activity tolerance;strength;cognition  -MARTY (r) GM (t) MARTY (c)     Cognitive Impairments, Mobility Safety/Performance attention;impulsivity;problem-solving/reasoning;safety precaution awareness  -MARTY (r) GM (t) MARTY (c)               User Key  (r) = Recorded By, (t) = Taken By, (c) = Cosigned By      Initials Name Provider Type    MARTY George Croft, PT DPT Physical Therapist    Gemma Mora, BOB Student PT Student                   Mobility       Row Name 07/18/25 1415          Bed Mobility    Bed Mobility supine-sit;sit-supine;scooting/bridging  -MARTY (r) GM (t) MARTY (c)     Scooting/Bridging [place] (Bed Mobility) independent  -MARTY (r) GM (t) MARTY (c)     Supine-Sit [place] (Bed Mobility) standby assist  -MARTY (r) GM (t) MARTY (c)     Sit-Supine [place] (Bed Mobility) standby assist  -MARTY (r) GM (t) MARTY (c)     Assistive Device (Bed Mobility) head of bed elevated  -MARTY (r) GM (t) MARTY (c)       Row Name 07/18/25 1415          " Sit-Stand Transfer    Sit-Stand [place] (Transfers) contact guard;1 person assist  -MARTY (r) GM (t) MARTY (c)     Assistive Device (Sit-Stand Transfers) walker, front-wheeled  -MARTY (r) GM (t) MARTY (c)       Row Name 07/18/25 1415          Gait/Stairs (Locomotion)    [place] Level (Gait) contact guard;1 person assist  -MARTY (r) GM (t) MARTY (c)     Assistive Device (Gait) walker, front-wheeled  -MARTY (r) GM (t) MARTY (c)     Distance in Feet (Gait) 8  -MARTY (r) GM (t) MARTY (c)               User Key  (r) = Recorded By, (t) = Taken By, (c) = Cosigned By      Initials Name Provider Type    George Singh, PT DPT Physical Therapist    Gemma Mora, BOB Student PT Student                   Obj/Interventions       Row Name 07/18/25 1415          Range of Motion Comprehensive    General Range of Motion no range of motion deficits identified  -MARTY (r) GM (t) MARTY (c)     Comment, General Range of Motion BLE grossly WFL  -MARTY (r) GM (t) MARTY (c)       Row Name 07/18/25 1415          Strength Comprehensive (MMT)    Comment, General Manual Muscle Testing (MMT) Assessment BLE: ankle 5/5; knee ext/flx 4+/5; hip flx 4/5  -MARTY (r) GM (t) MARTY (c)       Row Name 07/18/25 1415          Balance    Balance Assessment sitting static balance;standing static balance;standing dynamic balance  -MARTY (r) GM (t) MARTY (c)     Static Sitting Balance independent  -MARTY (r) GM (t) MARTY (c)     Position, Sitting Balance unsupported;sitting edge of bed  -MARTY (r) GM (t) MARTY (c)     Static Standing Balance contact guard;1-person assist  -MARTY (r) GM (t) MARTY (c)     Dynamic Standing Balance contact guard;1-person assist  -MARTY (r) GM (t) MARTY (c)     Position/Device Used, Standing Balance walker, front-wheeled  -MARTY (r) GM (t) MARTY (c)       Row Name 07/18/25 1415          Sensory Assessment (Somatosensory)    Sensory Assessment (Somatosensory) LE sensation intact  pt reports tingling in her feet but says that she can still feel them  -MARTY (r) GM (t) MARTY (c)               User Key   (r) = Recorded By, (t) = Taken By, (c) = Cosigned By      Initials Name Provider Type    George Singh, PT DPT Physical Therapist    Gemma Mora, BOB Student PT Student                   Goals/Plan       Row Name 07/18/25 1415          Bed Mobility Goal 1 (PT)    Activity/Assistive Device (Bed Mobility Goal 1, PT) sit to supine/supine to sit  -MARTY (r) GM (t) MARTY (c)     [place] Level/Cues Needed (Bed Mobility Goal 1, PT) independent  -MARTY (r) GM (t) MARTY (c)     Time Frame (Bed Mobility Goal 1, PT) long term goal (LTG);10 days  -MARTY (r) GM (t) MARTY (c)     Progress/Outcomes (Bed Mobility Goal 1, PT) new goal  -MARTY (r) GM (t) MARTY (c)       Row Name 07/18/25 1415          Transfer Goal 1 (PT)    Activity/Assistive Device (Transfer Goal 1, PT) sit-to-stand/stand-to-sit;bed-to-chair/chair-to-bed  -MARTY (r) GM (t) MARTY (c)     [place] Level/Cues Needed (Transfer Goal 1, PT) independent  -MARTY (r) GM (t) MARTY (c)     Time Frame (Transfer Goal 1, PT) long term goal (LTG);10 days  -MARTY (r) GM (t) MARTY (c)     Progress/Outcome (Transfer Goal 1, PT) new goal  -MARTY (r) GM (t) MARTY (c)       Row Name 07/18/25 1415          Gait Training Goal 1 (PT)    Activity/Assistive Device (Gait Training Goal 1, PT) gait (walking locomotion);walker, rolling  -MARTY (r) GM (t) MARTY (c)     [place] Level (Gait Training Goal 1, PT) independent  -MARTY (r) GM (t) MARTY (c)     Distance (Gait Training Goal 1, PT) 50  -MARTY (r) GM (t) MARTY (c)     Time Frame (Gait Training Goal 1, PT) 10 days;long term goal (LTG)  -MARTY (r) GM (t) MARTY (c)     Progress/Outcome (Gait Training Goal 1, PT) new goal  -MARTY (r) GM (t) MARTY (c)       Row Name 07/18/25 1269          Therapy Assessment/Plan (PT)    Planned Therapy Interventions (PT) balance training;bed mobility training;gait training;home exercise program;strengthening;postural re-education;patient/family education;transfer training  -MARTY (r) GM (t) MARTY (c)               User Key  (r) = Recorded By, (t) = Taken By, (c) =  Cosigned By      Initials Name Provider Type    George iSngh, PT DPT Physical Therapist    Gemma Mora, PT Student PT Student                   Clinical Impression       Row Name 07/18/25 1412          Pain    Pretreatment Pain Rating 0/10 - no pain  -MARTY (r) GM (t) MARTY (c)       Row Name 07/18/25 1416          Plan of Care Review    Plan of Care Reviewed With patient;child  -MARTY (r) GM (t) MARTY (c)     Progress no change  -MARTY (r) GM (t) MARTY (c)     Outcome Evaluation PT evaluation completed. Pt was in bed with son at bedside. Pt was oriented to self and place, but disoriented to situation and time. Pt is also extremely hard of hearing. Prior to hospitalization, pt was living alone in senior housing according to her son. Pt and son reported that she was independent with all functional mobility, ADLs, and ambulation without AD. Currently, pt was SBA with supine>sitting EOB. She was able to perform a sit<>stand with CGA, and she also took a few steps with CGA and RW. Pt ambulated ~8 ft in her room before reporting dizziness while standing. She requested to go back to bed d/t the dizziness. Pt performed sit>supine transition with SBA and demonstrated independence in scooting in the bed. Pt demo 4/5-5/5 BLE strength. Pt would benefit from continued PT services to address limitations in activity tolerance, functional mobility, balance, and AD training. Recommend dc home with assist vs SNF, pending pt's progress.  -MARTY (r) GM (t) MARTY (c)       Row Name 07/18/25 8369          Therapy Assessment/Plan (PT)    Patient/Family Therapy Goals Statement (PT) return to PLOF  -MARTY (r) GM (t) MARTY (c)     Rehab Potential (PT) good  -MARTY (r) GM (t) MARTY (c)     Criteria for Skilled Interventions Met (PT) yes;meets criteria  -MARTY (r) GM (t) MARTY (c)     Therapy Frequency (PT) 2 times/day  -MARTY (r) GM (t) MARTY (c)     Predicted Duration of Therapy Intervention (PT) until dc  -MARTY (r) GM (t) MARTY (c)       Row Name 07/18/25 7089           Positioning and Restraints    Pre-Treatment Position in bed  -MARTY (r) GM (t) MARTY (c)     Post Treatment Position bed  -MARTY (r) GM (t) MARTY (c)     In Bed fowlers;call light within reach;encouraged to call for assist;side rails up x3;with family/caregiver;patient within staff view  -MARTY (r) GM (t) MARTY (c)               User Key  (r) = Recorded By, (t) = Taken By, (c) = Cosigned By      Initials Name Provider Type    George Singh, PT DPT Physical Therapist    Gemma Mora, BOB Student PT Student                   Outcome Measures       Row Name 07/18/25 1630 07/18/25 1415       How much help from another person do you currently need...    Turning from your back to your side while in flat bed without using bedrails? 4  -EC 4  -MARTY (r) GM (t) MARTY (c)    Moving from lying on back to sitting on the side of a flat bed without bedrails? 4  -EC 4  -MARTY (r) GM (t) MARTY (c)    Moving to and from a bed to a chair (including a wheelchair)? 3  -EC 3  -MARTY (r) GM (t) MARTY (c)    Standing up from a chair using your arms (e.g., wheelchair, bedside chair)? 3  -EC 3  -MARTY (r) GM (t) MARTY (c)    Climbing 3-5 steps with a railing? 3  -EC 2  -MARTY    To walk in hospital room? 3  -EC 3  -MARTY (r) GM (t) MARTY (c)    AM-PAC 6 Clicks Score (PT) 20  -EC 19  -MARTY    Highest Level of Mobility Goal Walk 10 Steps or More-6  -EC Walk 10 Steps or More-6  -MARTY      Row Name 07/18/25 1221 07/18/25 0800       How much help from another person do you currently need...    Turning from your back to your side while in flat bed without using bedrails? 4  -EC 4  -EC    Moving from lying on back to sitting on the side of a flat bed without bedrails? 4  -EC 4  -EC    Moving to and from a bed to a chair (including a wheelchair)? 4  -EC 4  -EC    Standing up from a chair using your arms (e.g., wheelchair, bedside chair)? 3  -EC 4  -EC    Climbing 3-5 steps with a railing? 3  -EC 3  -EC    To walk in hospital room? 3  -EC 3  -EC    AM-PAC 6 Clicks Score (PT) 21  -EC 22  -EC     Highest Level of Mobility Goal Walk 10 Steps or More-6  -EC Walk 25 Feet or More-7  -EC      Row Name 07/18/25 1421 07/18/25 1415       Functional Assessment    Outcome Measure Options AM-PAC 6 Clicks Daily Activity (OT)  -SOLIS (r) BS (t) SOLIS (c) AM-PAC 6 Clicks Basic Mobility (PT)  -MARTY              User Key  (r) = Recorded By, (t) = Taken By, (c) = Cosigned By      Initials Name Provider Type    George Singh, PT DPT Physical Therapist    Jessica Lowry, OTR/L, CSRS Occupational Therapist    Juliette Beltran, RN Registered Nurse    Gemma Mora, PT Student PT Student    Tiffany Little, OT Student OT Student                                 Physical Therapy Education       Title: PT OT SLP Therapies (Done)       Topic: Physical Therapy (Done)       Point: Mobility training (Done)       Learning Progress Summary            Patient Acceptance, E,TB, VU,DU by  at 7/18/2025 1415    Comment: benefits of PT and mobility, AD positioning   Family Acceptance, E,TB, VU,DU by  at 7/18/2025 1415    Comment: benefits of PT and mobility, AD positioning                      Point: Home exercise program (Done)       Learning Progress Summary            Patient Acceptance, E,TB, VU,DU by  at 7/18/2025 1415    Comment: benefits of PT and mobility, AD positioning   Family Acceptance, E,TB, VU,DU by  at 7/18/2025 1415    Comment: benefits of PT and mobility, AD positioning                      Point: Body mechanics (Done)       Learning Progress Summary            Patient Acceptance, E,TB, VU,DU by  at 7/18/2025 1415    Comment: benefits of PT and mobility, AD positioning   Family Acceptance, E,TB, VU,DU by  at 7/18/2025 1415    Comment: benefits of PT and mobility, AD positioning                      Point: Precautions (Done)       Learning Progress Summary            Patient Acceptance, E,TB, VU,DU by  at 7/18/2025 1415    Comment: benefits of PT and mobility, AD positioning   Family Acceptance,  E,TB, VU,DU by  at 7/18/2025 1415    Comment: benefits of PT and mobility, AD positioning                                      User Key       Initials Effective Dates Name Provider Type Discipline     04/21/25 -  Gemma Beltrán, PT Student PT Student PT                  PT Recommendation and Plan  Planned Therapy Interventions (PT): balance training, bed mobility training, gait training, home exercise program, strengthening, postural re-education, patient/family education, transfer training  Progress: no change  Outcome Evaluation: PT evaluation completed. Pt was in bed with son at bedside. Pt was oriented to self and place, but disoriented to situation and time. Pt is also extremely hard of hearing. Prior to hospitalization, pt was living alone in senior housing according to her son. Pt and son reported that she was independent with all functional mobility, ADLs, and ambulation without AD. Currently, pt was SBA with supine>sitting EOB. She was able to perform a sit<>stand with CGA, and she also took a few steps with CGA and RW. Pt ambulated ~8 ft in her room before reporting dizziness while standing. She requested to go back to bed d/t the dizziness. Pt performed sit>supine transition with SBA and demonstrated independence in scooting in the bed. Pt demo 4/5-5/5 BLE strength. Pt would benefit from continued PT services to address limitations in activity tolerance, functional mobility, balance, and AD training. Recommend dc home with assist vs SNF, pending pt's progress.     Time Calculation:         PT Charges       Row Name 07/18/25 1415             Time Calculation    Start Time 1415  -MARTY (r) GM (t) MARTY (c)      Stop Time 1457  -MARTY (r) GM (t) MARTY (c)      Time Calculation (min) 42 min  -MARTY (r) GM (t)      PT Received On 07/18/25  -MARTY (r) GM (t) MARTY (c)      PT Goal Re-Cert Due Date 07/28/25  -MARTY (r) GM (t) MARTY (c)         Untimed Charges    PT Eval/Re-eval Minutes 42  -MARTY (r) GM (t) MARTY (c)         Total Minutes     Untimed Charges Total Minutes 42  -MARTY (r) GM (t)       Total Minutes 42  -MARTY (r) GM (t)                User Key  (r) = Recorded By, (t) = Taken By, (c) = Cosigned By      Initials Name Provider Type    eGorge Singh, PT DPT Physical Therapist    Gemma Mora, PT Student PT Student                      PT G-Codes  Outcome Measure Options: AM-PAC 6 Clicks Daily Activity (OT)  AM-PAC 6 Clicks Score (PT): 20  AM-PAC 6 Clicks Score (OT): 20       Gemma Beltrán PT Student  7/18/2025

## 2025-07-18 NOTE — PLAN OF CARE
Problem: Adult Inpatient Plan of Care  Goal: Plan of Care Review  Outcome: Progressing  Goal: Patient-Specific Goal (Individualized)  Outcome: Progressing  Goal: Absence of Hospital-Acquired Illness or Injury  Outcome: Progressing  Intervention: Identify and Manage Fall Risk  Recent Flowsheet Documentation  Taken 7/18/2025 1700 by Juliette Parker RN  Safety Promotion/Fall Prevention:   safety round/check completed   fall prevention program maintained  Taken 7/18/2025 1630 by Juliette Parker RN  Safety Promotion/Fall Prevention:   safety round/check completed   fall prevention program maintained  Taken 7/18/2025 1500 by Juliette Parker RN  Safety Promotion/Fall Prevention:   safety round/check completed   fall prevention program maintained  Taken 7/18/2025 1400 by Juliette Parker RN  Safety Promotion/Fall Prevention:   safety round/check completed   fall prevention program maintained  Taken 7/18/2025 1300 by Juliette Parker RN  Safety Promotion/Fall Prevention:   safety round/check completed   fall prevention program maintained  Taken 7/18/2025 1221 by Juliette Parker RN  Safety Promotion/Fall Prevention:   safety round/check completed   fall prevention program maintained  Taken 7/18/2025 1100 by Juliette Parker RN  Safety Promotion/Fall Prevention:   safety round/check completed   fall prevention program maintained  Taken 7/18/2025 1000 by Juliette Parker RN  Safety Promotion/Fall Prevention:   toileting scheduled   fall prevention program maintained  Taken 7/18/2025 0900 by Juliette Parker RN  Safety Promotion/Fall Prevention:   safety round/check completed   fall prevention program maintained  Taken 7/18/2025 0800 by Juliette Parker RN  Safety Promotion/Fall Prevention:   safety round/check completed   fall prevention program maintained  Taken 7/18/2025 0700 by Juliette Parker RN  Safety Promotion/Fall Prevention:   safety round/check completed   fall prevention program maintained  Intervention: Prevent Skin Injury  Recent  Flowsheet Documentation  Taken 7/18/2025 1700 by Julietet Parker RN  Body Position: position changed independently  Taken 7/18/2025 1500 by Juliette Parker RN  Body Position: position changed independently  Taken 7/18/2025 1300 by Juliette Parker RN  Body Position: position changed independently  Taken 7/18/2025 1221 by Juliette Parker RN  Skin Protection:   incontinence pads utilized   silicone foam dressing in place  Taken 7/18/2025 1100 by Juliette Parker RN  Body Position: position changed independently  Taken 7/18/2025 0900 by Juliette Parker RN  Body Position: position changed independently  Taken 7/18/2025 0800 by Juliette Parker RN  Skin Protection:   skin sealant/moisture barrier applied   incontinence pads utilized  Taken 7/18/2025 0700 by Juliette Parker RN  Body Position:   position changed independently   weight shifting  Intervention: Prevent Infection  Recent Flowsheet Documentation  Taken 7/18/2025 1100 by Juliette Parker RN  Infection Prevention: single patient room provided  Taken 7/18/2025 1000 by Juliette Parker RN  Infection Prevention:   single patient room provided   hand hygiene promoted  Taken 7/18/2025 0900 by Juliette Parker RN  Infection Prevention:   single patient room provided   hand hygiene promoted  Taken 7/18/2025 0800 by Juliette Parker RN  Infection Prevention:   single patient room provided   hand hygiene promoted  Taken 7/18/2025 0700 by Juliette Parker RN  Infection Prevention: single patient room provided  Goal: Optimal Comfort and Wellbeing  Outcome: Progressing  Intervention: Provide Person-Centered Care  Recent Flowsheet Documentation  Taken 7/18/2025 0800 by Juliette Parker RN  Trust Relationship/Rapport:   choices provided   care explained   thoughts/feelings acknowledged  Goal: Readiness for Transition of Care  Outcome: Progressing   Goal Outcome Evaluation:   Pt very [place]. Sodium has stayed between 113-116 all day. Currently Na+ is 114. Started back on 3% NS at 1800. 1500 mL fluid  restriction. Pt has been intermittently confused to situation. External catheter. Gets anxiety, treat with PRN atarax.

## 2025-07-18 NOTE — PLAN OF CARE
Goal Outcome Evaluation:  Plan of Care Reviewed With: patient, son        Progress: improving  Outcome Evaluation: OT eval completed. Pt presented fowlers in bed upon entering and agreeable to participate in therapy. Pt was A&O x2; disoriented to situation and time. Pt was very [place] throughout session. She reported independent in all ADLs and home management as her PLOF. She was able to transition from supine to sitting EOB with SBA. She performed ROM demonstrating B UE to be WFL. She displayed 4+/5 MMT at all UE joints bilaterally. She transitioned into standing with CGA and use of FWW once in standing for stability. Pt reported feeling dizzy once in standing. She was able to ambulate small distance forward and backward, however, desired to go back to bed due to dizziness. She transitioned back into supine with SBA. Pt required Max A to don and doff socks during the session. Ms. Rubin would benefit from skilled OT intervention to address deficits in fxl mobility, fxl activity tolerance, balance, cognition, and use of adaptive techniques/equipment during performance of BADLs. Recommend d/c to home with assist vs SNF pending progress.    Anticipated Discharge Disposition (OT): home with assist

## 2025-07-18 NOTE — TELEPHONE ENCOUNTER
FYI - her son just wanted us to know that she is still in [facility] and her sodium is low and will not come up to normal range.

## 2025-07-18 NOTE — PLAN OF CARE
Goal Outcome Evaluation:  Plan of Care Reviewed With: patient        Progress: no change  Outcome Evaluation: Remains alert and oriented x3. Not oriented to time. Moves all extremities equally. Sodium dropping. Monitoring labs closely.

## 2025-07-18 NOTE — CONSULTS
Nutrition Services    Patient Name: Leila Rubin  YOB: 1950  MRN: [id number]  Admission date: 7/16/2025  Reason for Encounter: MST 2-3 or Nursing Admission Screen      [facility] Clinical Nutrition Progress Note       Nutrition Intervention Updates: Change chocolate Boost Original to Boost Breeze TID per pt request.      Subjective: 7/18:  New consult per nurse admission screen today. This RD evaluated pt yesterday and completed NFPE. She qualified for severe malnutrition in the context of chronic disease, see MSA note 7/17. She was ordered chocolate Boost Original BID per her request. Visited pt at lunch today to check on her. She would prefer to have Boost Breeze instead of chocolate Boost. A family member in the room reports she had an orange Boost Breeze this morning and she consumed 100% of it. She had eaten ~1/2 of a grilled cheese and some baked [employer] chips. She planned to eat her cheesecake. She had not opened the berry Boost Breeze but reports she is going to try it. Will send orange flavor with breakfast and dinner, berry with lunch. Encouraged pt and family to let staff know if we need to change the flavors at anytime. Reminded pt to let staff know if she gets something to eat that she does not like or does not want and the kitchen can bring her an alternate selection. She will cont to be seen daily by a dining ambassador for meal choices. Cont to have hyponatremia, Na+ lab being monitored every 4 hours with interventions per MD discretion.       PO Diet: Diet: Regular/House; Fluid Consistency: Thin (IDDSI 0)   PO Supplements: Boost Breeze TID (Provides 750 kcals, 27 g protein if 3 cartons consumed)     PO Intake:  38%/2meals       Current nutrition support:    Nutrition support review:        Labs: Na+ being monitored every 4 hours with interventions modified as needed per MD discretion        GI Function:  Last BM 7/16        Brief Weight Review:    Wt Readings from Last 1  Encounters:   07/18/25 0455 41.1 kg (90 lb 9.7 oz)   07/16/25 1445 40.1 kg (88 lb 6.5 oz)   07/16/25 1020 37.9 kg (83 lb 8 oz)        Results from last 7 days   Lab Units 07/18/25  1227 07/18/25  0931 07/18/25  0345 07/17/25  1244 07/17/25  0636 07/16/25  2339 07/16/25  2109 07/16/25  1619 07/16/25  1037   SODIUM mmol/L 116* 114* 113*   < > 120*   < > 122*   < > 111*   POTASSIUM mmol/L  --   --  4.0  --  4.1  --  4.3  --  3.2*   CHLORIDE mmol/L  --   --  83*  --  87*  --   --   --  73*   CO2 mmol/L  --   --  18.0*  --  21.0*  --   --   --  22.0   BUN mg/dL  --   --  6.9*  --  7.4*  --   --   --  10.0   CREATININE mg/dL  --   --  0.54*  --  0.65  --   --   --  0.74   CALCIUM mg/dL  --   --  8.2*  --  8.7  --   --   --  9.5   BILIRUBIN mg/dL  --   --   --   --   --   --   --   --  1.0   ALK PHOS U/L  --   --   --   --   --   --   --   --  64   ALT (SGPT) U/L  --   --   --   --   --   --   --   --  10   AST (SGOT) U/L  --   --   --   --   --   --   --   --  30   GLUCOSE mg/dL  --   --  110*  --  92  --   --   --  120*    < > = values in this interval not displayed.     Results from last 7 days   Lab Units 07/18/25  0345 07/17/25  0636   MAGNESIUM mg/dL 1.7 1.8   PHOSPHORUS mg/dL 2.7 2.7   PLATELETS 10*3/mm3 267 304   HEMOGLOBIN g/dL 10.1* 10.0*   HEMATOCRIT % 30.0* 28.7*     Lab Results   Component Value Date    HGBA1C 5.4 02/24/2023       Electronically signed by:  Morenita Patricia RDN, LD  07/18/25 16:30 CDT

## 2025-07-18 NOTE — PROGRESS NOTES
[facility] Intensivist Services  INPATIENT PROGRESS NOTE    Patient Name: Leila Rubin  Date of Admission: 7/16/2025  Today's Date: 07/18/25  Length of Stay: 2  Primary Care Physician: Kaylan Kowalski MD    Subjective     Vomiting     Dehydration  Symptoms: vomiting       The patient is doing well this morning. Alert and interactive. No c/o. No vomiting overnight. Patient feels anxious this morning.     Objective    Temp:  [97.8 °F (36.6 °C)-98.2 °F (36.8 °C)] 97.9 °F (36.6 °C)  Heart Rate:  [63-99] 88  Resp:  [15-25] 25  BP: (102-179)/(44-93) 179/67  Physical Exam  NAD, AAOx3, hard of hearing  RRR no MRG  CTAB  Soft, NTND, NABS      Results Review:  Lab Results (last 24 hours)       Procedure Component Value Units Date/Time    Basic Metabolic Panel [[id number]]  (Abnormal) Collected: 07/18/25 0345    Specimen: Blood Updated: 07/18/25 0450     Glucose 110 mg/dL      BUN 6.9 mg/dL      Creatinine 0.54 mg/dL      Sodium 113 mmol/L      Potassium 4.0 mmol/L      Chloride 83 mmol/L      CO2 18.0 mmol/L      Calcium 8.2 mg/dL      BUN/Creatinine Ratio 12.8     Anion Gap 12.0 mmol/L      eGFR 95.6 mL/min/1.73     Narrative:      GFR Categories in Chronic Kidney Disease (CKD)              GFR Category          GFR (mL/min/1.73)    Interpretation  G1                    90 or greater        Normal or high (1)  G2                    60-89                Mild decrease (1)  G3a                   45-59                Mild to moderate decrease  G3b                   30-44                Moderate to severe decrease  G4                    15-29                Severe decrease  G5                    14 or less           Kidney failure    (1)In the absence of evidence of kidney disease, neither GFR category G1 or G2 fulfill the criteria for CKD.    eGFR calculation 2021 CKD-EPI creatinine equation, which does not include race as a factor    Magnesium [[id number]]  (Normal) Collected: 07/18/25 0345     Specimen: Blood Updated: 07/18/25 0450     Magnesium 1.7 mg/dL     Phosphorus [[id number]]  (Normal) Collected: 07/18/25 0345    Specimen: Blood Updated: 07/18/25 0443     Phosphorus 2.7 mg/dL     CBC & Differential [[id number]]  (Abnormal) Collected: 07/18/25 0345    Specimen: Blood Updated: 07/18/25 0422    Narrative:      The following orders were created for panel order CBC & Differential.  Procedure                               Abnormality         Status                     ---------                               -----------         ------                     CBC Auto Differential[[id number]]        Abnormal            Final result                 Please view results for these tests on the individual orders.    CBC Auto Differential [[id number]]  (Abnormal) Collected: 07/18/25 0345    Specimen: Blood Updated: 07/18/25 0422     WBC 7.04 10*3/mm3      RBC 3.53 10*6/mm3      Hemoglobin 10.1 g/dL      Hematocrit 30.0 %      MCV 85.0 fL      MCH 28.6 pg      MCHC 33.7 g/dL      RDW 12.8 %      RDW-SD 38.8 fl      MPV 9.3 fL      Platelets 267 10*3/mm3      Neutrophil % 50.7 %      Lymphocyte % 35.9 %      Monocyte % 9.8 %      Eosinophil % 2.3 %      Basophil % 0.6 %      Immature Grans % 0.7 %      Neutrophils, Absolute 3.57 10*3/mm3      Lymphocytes, Absolute 2.53 10*3/mm3      Monocytes, Absolute 0.69 10*3/mm3      Eosinophils, Absolute 0.16 10*3/mm3      Basophils, Absolute 0.04 10*3/mm3      Immature Grans, Absolute 0.05 10*3/mm3      nRBC 0.0 /100 WBC     Sodium [[id number]]  (Abnormal) Collected: 07/17/25 2338    Specimen: Blood Updated: 07/18/25 0025     Sodium 115 mmol/L     Sodium [[id number]]  (Abnormal) Collected: 07/17/25 1825    Specimen: Blood Updated: 07/17/25 1857     Sodium 117 mmol/L     Sodium [[id number]]  (Abnormal) Collected: 07/17/25 1244    Specimen: Blood Updated: 07/17/25 1310     Sodium 117 mmol/L              XR Chest 1 View  Result Date: 7/16/2025  1. Chronic lung changes. 2. No acute  infiltrate.      This report was signed and finalized on 7/16/2025 11:48 AM by Dr. Romeo Agrawal MD.      CT Abdomen Pelvis With Contrast  Result Date: 7/16/2025  1. An area of circumferential wall thickening and narrowing of the ascending colon adjacent and distal to the cecum may represent an inflammatory/infectious process. No obstruction. This may partly be due to incomplete distention. Further follow-up may be obtained. 2. A long segment of circumferential thickening of the wall of the descending colon with mild surrounding peritoneal infiltration may represent acute nonspecific colitis?. This may partly be due to incomplete distention. 3. Diverticulosis of the colon. No finding to suggest diverticulitis. 4. The appendix is not optimally visualized. There are no findings in the expected area of the appendix to suggest appendicitis. 5. Other nonacute findings similar to the previous study.               This report was signed and finalized on 7/16/2025 11:47 AM by Dr. Deisy Bolaños MD.        Result Review:  I have personally reviewed the results from the time of this admission to 7/18/2025 09:27 CDT and agree with these findings:  [x]  Laboratory list / accordion  []  Microbiology  []  Radiology  []  EKG/Telemetry   []  Cardiology/Vascular   []  Pathology  []  Old records  []  Other:    Assessment/Plan   Assessment  Active Hospital Problems    Diagnosis     **Hyponatremia     Severe protein-calorie malnutrition        Treatment Plan  The patient is a 73yo woman with a history of HTN who had recently been started on HCTZ who was admitted on 7/16 for hypoNa (Na 111 on presentation). The patient has had no neurologic deficits related to her hypoNa, and her Na is correcting appropriately.    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   - Na on admission 111, urine Na 50 and urine osmolarity 272, TSH 2.7 on 5/19/2025  -Na  corrected from 111 to 122, so NS was stopped and she was given D5W and desmopressin. Na trended down to 113 7/18.  3% hypertonic saline was initiated 7/18 in am and she received 60 cc x2. With improvement in Na to 116.   PLAN:  -we holding on fluid right now. We will recheck Na in 4 hours and restart normal saline if needed. Goal correction is 8 in next 254 hours, so 113+8 = 121 tomorrow 4 am labs  -HCTZ d/c'ed    2. NV  -The patient had 3-4 days of vomiting prior to admission. No vomiting since admission.  PLAN:  -Follow  - As needed Zofran if needed    3.  Hypertension  -Normotensive on this admission  Plan:  -Discontinue hydrochlorothiazide  - Will address blood pressure medication if becomes hypertensive    4.  Anxiety on this admission  - On home Zoloft  Plan:  - As needed hydroxyzine if needed    5.  COPD, not in acute exacerbation  - Restart home inhaler regiment with Symbicort and montelukast    CCT 35 min    Electronically signed by Niharika Leavitt DO on 7/18/2025 at 09:27 CDT

## 2025-07-18 NOTE — THERAPY EVALUATION
Patient Name: Leila Rubin  : 1950    MRN: [id number]                              Today's Date: 2025       Admit Date: 2025    Visit Dx:     ICD-10-CM ICD-9-CM   1. Nausea and vomiting, unspecified vomiting type  R11.2 787.01   2. Hyponatremia  E87.1 276.1   3. Hypochloremia  E87.8 276.9   4. Hypokalemia  E87.6 276.8   5. Colitis  K52.9 558.9     Patient Active Problem List   Diagnosis    Bronchiectasis without complication    Personal history of nicotine dependence    Hiatal hernia    GERD without esophagitis    History of environmental allergies    Hyponatremia    Severe protein-calorie malnutrition     Past Medical History:   Diagnosis Date    Bronchiectasis without complication 2023    GERD without esophagitis 2023    Hiatal hernia 2023    History of environmental allergies 2023    Multiple lung nodules on CT 2023    Personal history of nicotine dependence 2023     Past Surgical History:   Procedure Laterality Date    COLONOSCOPY  03/10/2014    Tics otherwise normal exam repeat in 5 years    ENDOSCOPY  01/15/2013    Moderate chronic active inflammation negative for Intestinal Metaplasia    EXCISION / BIOPSY BREAST / NIPPLE / DUCT Left     benign    HYSTERECTOMY      OOPHORECTOMY        General Information       Row Name 25 1421 25 1403       OT Time and Intention    Subjective Information no complaints  -JW (r) BS (t) JW (c) --    Document Type evaluation  Pt presented to ED with severe weakness after 4 days of N&V. Pt has had very little oral intake. Upon arrival in the ED, pt was found to be severely hyponatremic. Imaging shows possible colitis.  -JW (r) BS (t) JW (c) --  -JW (r) BS (t) JW (c)    Mode of Treatment occupational therapy  Pt hx:  HTN, hypothyroidism, HLD, bronchiectasis, depression and GERD. Pt dx: hyponatremia  -JW (r) BS (t) JW (c) --  -JW (r) BS (t) JW (c)    Patient Effort good  -JW (r) BS (t) JW (c) --    Symptoms Noted  During/After Treatment dizziness  -JW (r) BS (t) JW (c) --      Row Name 07/18/25 1421          General Information    Patient Profile Reviewed yes  -JW (r) BS (t) JW (c)     Prior Level of Function independent:;all household mobility;ADL's;home management  -JW (r) BS (t) JW (c)     Existing Precautions/Restrictions fall  -JW (r) BS (t) JW (c)     Barriers to Rehab medically complex;cognitive status  -JW (r) BS (t) JW (c)       Row Name 07/18/25 1421          Living Environment    Current Living Arrangements home  -JW (r) BS (t) JW (c)     People in Home alone  -JW (r) BS (t) JW (c)       Row Name 07/18/25 1421          Home Main Entrance    Number of Stairs, Main Entrance one  one step that she has to go up from parking lot; no steps directly into her home.  -JW (r) BS (t) JW (c)     Stair Railings, Main Entrance none  -JW (r) BS (t) JW (c)       Row Name 07/18/25 1421          Stairs Within Home, Primary    Number of Stairs, Within Home, Primary none  -JW (r) BS (t) JW (c)     Stair Railings, Within Home, Primary none  -JW (r) BS (t) JW (c)       Row Name 07/18/25 1421          Cognition    Orientation Status (Cognition) oriented to;person;place;disoriented to;situation;time  -JW (r) BS (t) JW (c)       Row Name 07/18/25 1421          Safety Issues/Impairments Affecting Functional Mobility    Safety Issues Affecting Function (Mobility) impulsivity;safety precaution awareness  -JW (r) BS (t) JW (c)     Impairments Affecting Function (Mobility) balance;cognition;endurance/activity tolerance  -JW (r) BS (t) JW (c)     Cognitive Impairments, Mobility Safety/Performance impulsivity;safety precaution awareness  -JW (r) BS (t) JW (c)               User Key  (r) = Recorded By, (t) = Taken By, (c) = Cosigned By      Initials Name Provider Type    Jessica Lowry, OTR/L, CSRS Occupational Therapist    Tiffany Little, OT Student OT Student                     Mobility/ADL's       Row Name 07/18/25 1421           Bed Mobility    Bed Mobility scooting/bridging;supine-sit;sit-supine  -JW (r) BS (t) JW (c)     Scooting/Bridging [place] (Bed Mobility) independent  -JW (r) BS (t) JW (c)     Supine-Sit [place] (Bed Mobility) standby assist  -JW (r) BS (t) JW (c)     Sit-Supine [place] (Bed Mobility) standby assist  -JW (r) BS (t) JW (c)     Bed Mobility, Safety Issues cognitive deficits limit understanding  -JW (r) BS (t) JW (c)       Row Name 07/18/25 1421          Transfers    Transfers sit-stand transfer;stand-sit transfer  -JW (r) BS (t) JW (c)       Row Name 07/18/25 1421          Sit-Stand Transfer    Sit-Stand [place] (Transfers) contact guard;1 person assist  -JW (r) BS (t) JW (c)     Assistive Device (Sit-Stand Transfers) walker, front-wheeled  -JW (r) BS (t) JW (c)       Row Name 07/18/25 1421          Stand-Sit Transfer    Stand-Sit [place] (Transfers) contact guard;1 person assist  -JW (r) BS (t) JW (c)     Assistive Device (Stand-Sit Transfers) walker, front-wheeled  -JW (r) BS (t) JW (c)       Row Name 07/18/25 1421          Functional Mobility    Functional Mobility- Ind. Level contact guard assist  -JW (r) BS (t) JW (c)     Functional Mobility- Device walker, front-wheeled  -JW (r) BS (t) JW (c)     Functional Mobility- Comment Pt ambulated small steps forward and backward. Pt reported dizziness, otherwise, I think she could have went a further distance.  -JW (r) BS (t) JW (c)       Row Name 07/18/25 1421          Activities of Daily Living    BADL Assessment/Intervention lower body dressing  -JW (r) BS (t) JW (c)       Row Name 07/18/25 1421          Lower Body Dressing Assessment/Training    [place] Level (Lower Body Dressing) don;doff;socks;maximum assist (25% patient effort)  -JW (r) BS (t) JW (c)     Position (Lower Body Dressing) sitting up in bed  -JW (r) BS (t) JW (c)               User Key  (r) = Recorded By, (t) = Taken By, (c) = Cosigned By      Initials Name Provider Type     Jessica Lowry, OTR/L, CSRS Occupational Therapist    Tiffany Little, OT Student OT Student                   Obj/Interventions       Row Name 07/18/25 1421          Sensory Assessment (Somatosensory)    Sensory Assessment (Somatosensory) UE sensation intact  -JW (r) BS (t) JW (c)       Row Name 07/18/25 1421          Vision Assessment/Intervention    Visual Impairment/Limitations corrective lenses full-time  -JW (r) BS (t) JW (c)       Row Name 07/18/25 1421          Range of Motion Comprehensive    General Range of Motion bilateral upper extremity ROM WFL  -JW (r) BS (t) JW (c)     Comment, General Range of Motion Pt demonstrated B UE ROM WFL at all joints  -JW (r) BS (t) JW (c)       Row Name 07/18/25 1421          Strength Comprehensive (MMT)    General Manual Muscle Testing (MMT) Assessment no strength deficits identified  -JW (r) BS (t) JW (c)     Comment, General Manual Muscle Testing (MMT) Assessment Pt demonstrated B UE strength 4+/5 at all joints  -JW (r) BS (t) JW (c)       Row Name 07/18/25 [facility]          Balance    Balance Assessment sitting static balance;sitting dynamic balance;sit to stand dynamic balance;standing static balance;standing dynamic balance  -JW (r) BS (t) JW (c)     Static Sitting Balance independent  -JW (r) BS (t) SOLIS (c)     Dynamic Sitting Balance supervision  -JW (r) BS (t) SOLIS (c)     Position, Sitting Balance unsupported;sitting edge of bed  -JW (r) BS (t) JW (c)     Sit to Stand Dynamic Balance contact guard;1-person assist  -JW (r) BS (t) SOLIS (c)     Static Standing Balance contact guard;1-person assist  -JW (r) BS (t) SOLIS (c)     Dynamic Standing Balance contact guard;1-person assist  -JW (r) BS (t) JW (c)     Position/Device Used, Standing Balance supported;walker, front-wheeled  -JW (r) BS (t) JW (c)     Balance Interventions sitting;standing;sit to stand;supported;static;dynamic;occupation based/functional task  -JW (r) BS (t) JW (c)               User Key  (r) =  Recorded By, (t) = Taken By, (c) = Cosigned By      Initials Name Provider Type    Jessica Lowry, OTR/L, CSRS Occupational Therapist    Tiffany Little, OT Student OT Student                   Goals/Plan       Row Name 07/18/25 1421          Transfer Goal 1 (OT)    Activity/Assistive Device (Transfer Goal 1, OT) bed-to-chair/chair-to-bed;sit-to-stand/stand-to-sit;commode, 3-in-1  -JW (r) BS (t) JW (c)     [place] Level/Cues Needed (Transfer Goal 1, OT) standby assist  -JW (r) BS (t) JW (c)     Time Frame (Transfer Goal 1, OT) long term goal (LTG);by discharge  -JW (r) BS (t) JW (c)     Progress/Outcome (Transfer Goal 1, OT) new goal  -JW (r) BS (t) JW (c)       Row Name 07/18/25 1421          Dressing Goal 1 (OT)    Activity/Device (Dressing Goal 1, OT) dressing skills, all  -JW (r) BS (t) JW (c)     [place]/Cues Needed (Dressing Goal 1, OT) independent  -JW (r) BS (t) JW (c)     Time Frame (Dressing Goal 1, OT) long term goal (LTG);by discharge  -JW (r) BS (t) JW (c)     Strategies/Barriers (Dressing Goal 1, OT) while sititng EOB  -JW (r) BS (t) JW (c)     Progress/Outcome (Dressing Goal 1, OT) new goal  -JW (r) BS (t) JW (c)       Row Name 07/18/25 1421          Grooming Goal 1 (OT)    Activity/Device (Grooming Goal 1, OT) hair care;oral care;wash face, hands  -JW (r) BS (t) JW (c)     [place] (Grooming Goal 1, OT) standby assist  -JW (r) BS (t) JW (c)     Time Frame (Grooming Goal 1, OT) long term goal (LTG);by discharge  -JW (r) BS (t) JW (c)     Strategies/Barriers (Grooming Goal 1, OT) while in standing position  -JW (r) BS (t) JW (c)     Progress/Outcome (Grooming Goal 1, OT) new goal  -JW (r) STACEY (t) SOLIS (luis e)       Row Name 07/18/25 1429          Therapy Assessment/Plan (OT)    Planned Therapy Interventions (OT) activity tolerance training;adaptive equipment training;BADL retraining;functional balance retraining;occupation/activity based interventions;patient/caregiver  education/training;ROM/therapeutic exercise;strengthening exercise;transfer/mobility retraining  -SOLIS (r) STACEY (t) SOLIS (c)               User Key  (r) = Recorded By, (t) = Taken By, (c) = Cosigned By      Initials Name Provider Type    Jessica Lowry, OTR/L, CSRS Occupational Therapist    Tiffany Little, OT Student OT Student                   Clinical Impression       Row Name 07/18/25 1421          Pain Assessment    Pretreatment Pain Rating 0/10 - no pain  -SOLIS (r) STACEY (t) SOLIS (c)     Posttreatment Pain Rating 0/10 - no pain  -SOLIS (r) STACEY (t) SOLIS (c)       Row Name 07/18/25 1421          Plan of Care Review    Plan of Care Reviewed With patient;son  -SOLIS (r) STACEY (t) SOLIS (c)     Progress improving  -SOLIS (fredrick) STACEY (t) SOLIS (luis e)     Outcome Evaluation OT eval completed. Pt presented fowlers in bed upon entering and agreeable to participate in therapy. Pt was A&O x2; disoriented to situation and time. Pt was very [place] throughout session. She reported independent in all ADLs and home management as her PLOF. She was able to transition from supine to sitting EOB with SBA. She performed ROM demonstrating B UE to be WFL. She displayed 4+/5 MMT at all UE joints bilaterally. She transitioned into standing with CGA and use of FWW once in standing for stability. Pt reported feeling dizzy once in standing. She was able to ambulate small distance forward and backward, however, desired to go back to bed due to dizziness. She transitioned back into supine with SBA. Pt required Max A to don and doff socks during the session. Ms. Rubin would benefit from skilled OT intervention to address deficits in fxl mobility, fxl activity tolerance, balance, cognition, and use of adaptive techniques/equipment during performance of BADLs. Recommend d/c to home with assist vs SNF pending progress.  -SOLIS (fredrick) STACEY (t) SOLIS (c)       Row Name 07/18/25 1421          Therapy Assessment/Plan (OT)    Rehab Potential (OT) good  -SOLIS (fredrick) STACEY (t) SOLIS (c)     Criteria  for Skilled Therapeutic Interventions Met (OT) yes;skilled treatment is necessary  -JW (r) BS (t) JW (c)     Therapy Frequency (OT) 5 times/wk  -JW (r) BS (t) JW (c)     Predicted Duration of Therapy Intervention (OT) 10 days  -JW (r) BS (t) JW (c)       Row Name 07/18/25 1421          Therapy Plan Review/Discharge Plan (OT)    Equipment Needs Upon Discharge (OT) reacher;walker, standard;bathing equipment  -JW (r) BS (t) JW (c)     Anticipated Discharge Disposition (OT) home with assist  -JW (r) BS (t) JW (c)       Row Name 07/18/25 1421          Vital Signs    O2 Delivery Pre Treatment room air  -JW (r) BS (t) JW (c)     O2 Delivery Intra Treatment room air  -JW (r) BS (t) JW (c)     O2 Delivery Post Treatment room air  -JW (r) BS (t) JW (c)     Pre Patient Position Supine  -JW (r) BS (t) JW (c)     Intra Patient Position Standing  -JW (r) BS (t) JW (c)     Post Patient Position Supine  -JW (r) BS (t) JW (c)       Row Name 07/18/25 1421          Positioning and Restraints    Pre-Treatment Position in bed  -JW (r) BS (t) JW (c)     Post Treatment Position bed  -JW (r) BS (t) JW (c)     In Bed fowlers;call light within reach;encouraged to call for assist;patient within staff view;with family/caregiver;side rails up x3  -JW (r) BS (t) JW (c)               User Key  (r) = Recorded By, (t) = Taken By, (c) = Cosigned By      Initials Name Provider Type    Jessica Lowry, OTR/L, CSRS Occupational Therapist    Tiffany Little, OT Student OT Student                   Outcome Measures       Row Name 07/18/25 1421          How much help from another is currently needed...    Putting on and taking off regular lower body clothing? 3  -JW (r) BS (t) JW (c)     Bathing (including washing, rinsing, and drying) 3  -JW (r) BS (t) JW (c)     Toileting (which includes using toilet bed pan or urinal) 3  -JW (r) BS (t) JW (c)     Putting on and taking off regular upper body clothing 3  -SOLIS ibarra) STACEY (t) SOLIS (luis e)     Taking care of  personal grooming (such as brushing teeth) 4  -JW (r) BS (t) JW (c)     Eating meals 4  -JW (r) BS (t) JW (c)     AM-PAC 6 Clicks Score (OT) 20  -JW (r) BS (t)       Row Name 07/18/25 1221 07/18/25 0800       How much help from another person do you currently need...    Turning from your back to your side while in flat bed without using bedrails? 4  -EC 4  -EC    Moving from lying on back to sitting on the side of a flat bed without bedrails? 4  -EC 4  -EC    Moving to and from a bed to a chair (including a wheelchair)? 4  -EC 4  -EC    Standing up from a chair using your arms (e.g., wheelchair, bedside chair)? 3  -EC 4  -EC    Climbing 3-5 steps with a railing? 3  -EC 3  -EC    To walk in hospital room? 3  -EC 3  -EC    AM-PAC 6 Clicks Score (PT) 21  -EC 22  -EC      Row Name 07/18/25 1421          Functional Assessment    Outcome Measure Options AM-PAC 6 Clicks Daily Activity (OT)  -JW (r) BS (t) JW (c)               User Key  (r) = Recorded By, (t) = Taken By, (c) = Cosigned By      Initials Name Provider Type     Jessica Boles, OTR/L, CSRS Occupational Therapist    EC Juliette Parker, RN Registered Nurse    Tiffany Little, OT Student OT Student                    Occupational Therapy Education       Title: PT OT SLP Therapies (Done)       Topic: Occupational Therapy (Done)       Point: ADL training (Done)       Learning Progress Summary            Patient Acceptance, E, VU,NR by  at 7/18/2025 1421    Comment: OT role in care                      Point: Body mechanics (Done)       Learning Progress Summary            Patient Acceptance, E, VU,NR by  at 7/18/2025 1421    Comment: OT role in care                                      User Key       Initials Effective Dates Name Provider Type [facility] 05/12/25 -  Tiffany Pichardo, OT Student OT Student OT                  OT Recommendation and Plan  Planned Therapy Interventions (OT): activity tolerance training, adaptive equipment training, BADL  retraining, functional balance retraining, occupation/activity based interventions, patient/caregiver education/training, ROM/therapeutic exercise, strengthening exercise, transfer/mobility retraining  Therapy Frequency (OT): 5 times/wk  Plan of Care Review  Plan of Care Reviewed With: patient, son  Progress: improving  Outcome Evaluation: OT eval completed. Pt presented fowlers in bed upon entering and agreeable to participate in therapy. Pt was A&O x2; disoriented to situation and time. Pt was very [place] throughout session. She reported independent in all ADLs and home management as her PLOF. She was able to transition from supine to sitting EOB with SBA. She performed ROM demonstrating B UE to be WFL. She displayed 4+/5 MMT at all UE joints bilaterally. She transitioned into standing with CGA and use of FWW once in standing for stability. Pt reported feeling dizzy once in standing. She was able to ambulate small distance forward and backward, however, desired to go back to bed due to dizziness. She transitioned back into supine with SBA. Pt required Max A to don and doff socks during the session. Ms. Rubin would benefit from skilled OT intervention to address deficits in fxl mobility, fxl activity tolerance, balance, cognition, and use of adaptive techniques/equipment during performance of BADLs. Recommend d/c to home with assist vs SNF pending progress.     Time Calculation:         Time Calculation- OT       Row Name 07/18/25 1413             Time Calculation- OT    OT Start Time 1421  add 15 minutes for chart review  -SOLIS (fredrick) STACEY (t) SOLIS (c)      OT Stop Time 1453  -SOLIS (r) STACEY (t) JW (c)      OT Time Calculation (min) 32 min  -SOLIS (r) BS (t)      Total Timed Code Minutes- OT 47 minute(s)  -SOLIS (r) STACEY (t) JW (c)      OT Received On 07/18/25  -SOLIS (r) STACEY (t) JW (c)      OT Goal Re-Cert Due Date 07/28/25  -SOLIS (r) STACEY (t) SOLIS (c)         Untimed Charges    OT Eval/Re-eval Minutes 47  -SOLIS (r) STACEY (t) JW (c)         Total  Minutes    Untimed Charges Total Minutes 47  -JW (r) BS (t)       Total Minutes 47  -JW (r) BS (t)                User Key  (r) = Recorded By, (t) = Taken By, (c) = Cosigned By      Initials Name Provider Type    Jessica Lowry, OTR/L, CSRS Occupational Therapist    Tiffany Little, OT Student OT Student                           Tiffany Pichardo, OT Student  7/18/2025

## 2025-07-18 NOTE — PLAN OF CARE
Goal Outcome Evaluation:  Plan of Care Reviewed With: patient, child        Progress: no change  Outcome Evaluation: PT evaluation completed. Pt was in bed with son at bedside. Pt was oriented to self and place, but disoriented to situation and time. Pt is also extremely hard of hearing. Prior to hospitalization, pt was living alone in senior housing according to her son. Pt and son reported that she was independent with all functional mobility, ADLs, and ambulation without AD. Currently, pt was SBA with supine>sitting EOB. She was able to perform a sit<>stand with CGA, and she also took a few steps with CGA and RW. Pt ambulated ~8 ft in her room before reporting dizziness while standing. She requested to go back to bed d/t the dizziness. Pt performed sit>supine transition with SBA and demonstrated independence in scooting in the bed. Pt demo 4/5-5/5 BLE strength. Pt would benefit from continued PT services to address limitations in activity tolerance, functional mobility, balance, and AD training. Recommend dc home with assist vs SNF, pending pt's progress.

## 2025-07-18 NOTE — CONSULTS
At this time, per physician, PICC no longer needed for 3% NACL. Will cancel placement for now, please reconsult if needed.

## 2025-07-19 LAB
ANION GAP SERPL CALCULATED.3IONS-SCNC: 9 MMOL/L (ref 5–15)
BASOPHILS # BLD AUTO: 0.03 10*3/MM3 (ref 0–0.2)
BASOPHILS NFR BLD AUTO: 0.5 % (ref 0–1.5)
BUN SERPL-MCNC: 5.9 MG/DL (ref 8–23)
BUN/CREAT SERPL: 12.3 (ref 7–25)
CALCIUM SPEC-SCNC: 8.4 MG/DL (ref 8.6–10.5)
CHLORIDE SERPL-SCNC: 94 MMOL/L (ref 98–107)
CO2 SERPL-SCNC: 22 MMOL/L (ref 22–29)
CREAT SERPL-MCNC: 0.48 MG/DL (ref 0.57–1)
DEPRECATED RDW RBC AUTO: 39.1 FL (ref 37–54)
EGFRCR SERPLBLD CKD-EPI 2021: 98.3 ML/MIN/1.73
EOSINOPHIL # BLD AUTO: 0.11 10*3/MM3 (ref 0–0.4)
EOSINOPHIL NFR BLD AUTO: 1.8 % (ref 0.3–6.2)
ERYTHROCYTE [DISTWIDTH] IN BLOOD BY AUTOMATED COUNT: 12.8 % (ref 12.3–15.4)
GLUCOSE SERPL-MCNC: 92 MG/DL (ref 65–99)
HCT VFR BLD AUTO: 25.7 % (ref 34–46.6)
HCT VFR BLD AUTO: 29.5 % (ref 34–46.6)
HGB BLD-MCNC: 8.9 G/DL (ref 12–15.9)
HGB BLD-MCNC: 9.6 G/DL (ref 12–15.9)
IMM GRANULOCYTES # BLD AUTO: 0.02 10*3/MM3 (ref 0–0.05)
IMM GRANULOCYTES NFR BLD AUTO: 0.3 % (ref 0–0.5)
LYMPHOCYTES # BLD AUTO: 1.93 10*3/MM3 (ref 0.7–3.1)
LYMPHOCYTES NFR BLD AUTO: 31.7 % (ref 19.6–45.3)
MAGNESIUM SERPL-MCNC: 1.7 MG/DL (ref 1.6–2.4)
MCH RBC QN AUTO: 29 PG (ref 26.6–33)
MCHC RBC AUTO-ENTMCNC: 34.6 G/DL (ref 31.5–35.7)
MCV RBC AUTO: 83.7 FL (ref 79–97)
MONOCYTES # BLD AUTO: 0.76 10*3/MM3 (ref 0.1–0.9)
MONOCYTES NFR BLD AUTO: 12.5 % (ref 5–12)
NEUTROPHILS NFR BLD AUTO: 3.23 10*3/MM3 (ref 1.7–7)
NEUTROPHILS NFR BLD AUTO: 53.2 % (ref 42.7–76)
NRBC BLD AUTO-RTO: 0 /100 WBC (ref 0–0.2)
PHOSPHATE SERPL-MCNC: 3 MG/DL (ref 2.5–4.5)
PLATELET # BLD AUTO: 269 10*3/MM3 (ref 140–450)
PMV BLD AUTO: 9.2 FL (ref 6–12)
POTASSIUM SERPL-SCNC: 3.9 MMOL/L (ref 3.5–5.2)
POTASSIUM SERPL-SCNC: 4.8 MMOL/L (ref 3.5–5.2)
RBC # BLD AUTO: 3.07 10*6/MM3 (ref 3.77–5.28)
SODIUM SERPL-SCNC: 123 MMOL/L (ref 136–145)
SODIUM SERPL-SCNC: 125 MMOL/L (ref 136–145)
SODIUM SERPL-SCNC: 126 MMOL/L (ref 136–145)
SODIUM SERPL-SCNC: 128 MMOL/L (ref 136–145)
WBC NRBC COR # BLD AUTO: 6.08 10*3/MM3 (ref 3.4–10.8)

## 2025-07-19 PROCEDURE — 84100 ASSAY OF PHOSPHORUS: CPT | Performed by: NURSE PRACTITIONER

## 2025-07-19 PROCEDURE — 83735 ASSAY OF MAGNESIUM: CPT | Performed by: NURSE PRACTITIONER

## 2025-07-19 PROCEDURE — 94799 UNLISTED PULMONARY SVC/PX: CPT

## 2025-07-19 PROCEDURE — 94761 N-INVAS EAR/PLS OXIMETRY MLT: CPT

## 2025-07-19 PROCEDURE — 85014 HEMATOCRIT: CPT | Performed by: PHYSICIAN ASSISTANT

## 2025-07-19 PROCEDURE — 85025 COMPLETE CBC W/AUTO DIFF WBC: CPT | Performed by: NURSE PRACTITIONER

## 2025-07-19 PROCEDURE — 25010000002 ENOXAPARIN PER 10 MG: Performed by: NURSE PRACTITIONER

## 2025-07-19 PROCEDURE — 84132 ASSAY OF SERUM POTASSIUM: CPT | Performed by: PHYSICIAN ASSISTANT

## 2025-07-19 PROCEDURE — 85018 HEMOGLOBIN: CPT | Performed by: PHYSICIAN ASSISTANT

## 2025-07-19 PROCEDURE — 80048 BASIC METABOLIC PNL TOTAL CA: CPT | Performed by: NURSE PRACTITIONER

## 2025-07-19 PROCEDURE — 84295 ASSAY OF SERUM SODIUM: CPT | Performed by: STUDENT IN AN ORGANIZED HEALTH CARE EDUCATION/TRAINING PROGRAM

## 2025-07-19 RX ORDER — POTASSIUM CHLORIDE 1.5 G/1.58G
40 POWDER, FOR SOLUTION ORAL ONCE
Status: COMPLETED | OUTPATIENT
Start: 2025-07-19 | End: 2025-07-19

## 2025-07-19 RX ADMIN — ASPIRIN 81 MG: 81 TABLET, COATED ORAL at 08:24

## 2025-07-19 RX ADMIN — DOCUSATE SODIUM 50 MG AND SENNOSIDES 8.6 MG 2 TABLET: 8.6; 5 TABLET, FILM COATED ORAL at 08:24

## 2025-07-19 RX ADMIN — CHLORHEXIDINE GLUCONATE 1 APPLICATION: 500 CLOTH TOPICAL at 05:49

## 2025-07-19 RX ADMIN — Medication 1 APPLICATION: at 08:24

## 2025-07-19 RX ADMIN — POTASSIUM CHLORIDE 40 MEQ: 1500 TABLET, EXTENDED RELEASE ORAL at 00:36

## 2025-07-19 RX ADMIN — Medication 1 APPLICATION: at 21:47

## 2025-07-19 RX ADMIN — POTASSIUM CHLORIDE 40 MEQ: 1.5 POWDER, FOR SOLUTION ORAL at 05:49

## 2025-07-19 RX ADMIN — SERTRALINE HYDROCHLORIDE 50 MG: 100 TABLET, FILM COATED ORAL at 21:47

## 2025-07-19 RX ADMIN — HYDROXYZINE HYDROCHLORIDE 25 MG: 25 TABLET, FILM COATED ORAL at 22:54

## 2025-07-19 RX ADMIN — BUDESONIDE AND FORMOTEROL FUMARATE DIHYDRATE 2 PUFF: 160; 4.5 AEROSOL RESPIRATORY (INHALATION) at 18:19

## 2025-07-19 RX ADMIN — PANTOPRAZOLE SODIUM 40 MG: 40 TABLET, DELAYED RELEASE ORAL at 08:24

## 2025-07-19 RX ADMIN — Medication 10 ML: at 21:48

## 2025-07-19 RX ADMIN — ENOXAPARIN SODIUM 30 MG: 100 INJECTION SUBCUTANEOUS at 16:52

## 2025-07-19 RX ADMIN — MONTELUKAST 10 MG: 10 TABLET, FILM COATED ORAL at 21:47

## 2025-07-19 RX ADMIN — Medication 10 ML: at 08:25

## 2025-07-19 RX ADMIN — HYDROXYZINE HYDROCHLORIDE 25 MG: 25 TABLET, FILM COATED ORAL at 08:24

## 2025-07-19 RX ADMIN — BUDESONIDE AND FORMOTEROL FUMARATE DIHYDRATE 2 PUFF: 160; 4.5 AEROSOL RESPIRATORY (INHALATION) at 07:02

## 2025-07-19 RX ADMIN — ATORVASTATIN CALCIUM 10 MG: 10 TABLET, FILM COATED ORAL at 21:47

## 2025-07-19 NOTE — PLAN OF CARE
Goal Outcome Evaluation:   Pt A&Ox3, confused to the situation intermittently. NSR, BP stable within parameters. Tolerating RA. No BM this shift, UOP adequate; external catheter in place. Sodium was 125 at the last check, 3% saline stopped earlier in the shift, see MAR. Potassium 3.5, replacement given. Hgb was 8.9 this AM, PA notified, recheck ordered for 1000. Safety maintained.

## 2025-07-19 NOTE — PLAN OF CARE
Goal Outcome Evaluation: From unit today with sodium imbalance. Patient A+Ox4, MORAN- patient [place] currently without her hearing aids. Family in room. Patient asst x1, Fluid restriction 1500/day. Electrolyte protocol in place and labs to follow. Daily weight.

## 2025-07-19 NOTE — PROGRESS NOTES
[facility] Intensivist Services  INPATIENT PROGRESS NOTE    Patient Name: Leila Rubin  Date of Admission: 7/16/2025  Today's Date: 07/19/25  Length of Stay: 3  Primary Care Physician: Kaylan Kowalski MD    Subjective     Vomiting     Dehydration  Symptoms: vomiting       The patient is doing well this morning. Alert and interactive. No c/o. No vomiting overnight. Patient is feeling well this morning, interactive.     Objective    Temp:  [97.7 °F (36.5 °C)-98.2 °F (36.8 °C)] 97.7 °F (36.5 °C)  Heart Rate:  [67-94] 74  Resp:  [17-24] 24  BP: ()/(48-78) 132/56  Physical Exam  NAD, AAOx3, hard of hearing  RRR no MRG  CTAB  Soft, NTND, NABS      Results Review:  Lab Results (last 24 hours)       Procedure Component Value Units Date/Time    Sodium [[id number]]  (Abnormal) Collected: 07/19/25 1157    Specimen: Blood Updated: 07/19/25 1324     Sodium 128 mmol/L     Sodium [[id number]]  (Abnormal) Collected: 07/19/25 0810    Specimen: Blood Updated: 07/19/25 0831     Sodium 128 mmol/L     Potassium [[id number]]  (Normal) Collected: 07/19/25 0810    Specimen: Blood Updated: 07/19/25 0831     Potassium 4.8 mmol/L     Hemoglobin & Hematocrit, Blood [[id number]]  (Abnormal) Collected: 07/19/25 0810    Specimen: Blood Updated: 07/19/25 0821     Hemoglobin 9.6 g/dL      Hematocrit 29.5 %     Basic Metabolic Panel [[id number]]  (Abnormal) Collected: 07/19/25 0324    Specimen: Blood Updated: 07/19/25 0413     Glucose 92 mg/dL      BUN 5.9 mg/dL      Creatinine 0.48 mg/dL      Sodium 125 mmol/L      Potassium 3.9 mmol/L      Chloride 94 mmol/L      CO2 22.0 mmol/L      Calcium 8.4 mg/dL      BUN/Creatinine Ratio 12.3     Anion Gap 9.0 mmol/L      eGFR 98.3 mL/min/1.73     Narrative:      GFR Categories in Chronic Kidney Disease (CKD)              GFR Category          GFR (mL/min/1.73)    Interpretation  G1                    90 or greater        Normal or high (1)  G2                     60-89                Mild decrease (1)  G3a                   45-59                Mild to moderate decrease  G3b                   30-44                Moderate to severe decrease  G4                    15-29                Severe decrease  G5                    14 or less           Kidney failure    (1)In the absence of evidence of kidney disease, neither GFR category G1 or G2 fulfill the criteria for CKD.    eGFR calculation 2021 CKD-EPI creatinine equation, which does not include race as a factor    Magnesium [[id number]]  (Normal) Collected: 07/19/25 0324    Specimen: Blood Updated: 07/19/25 0413     Magnesium 1.7 mg/dL     Phosphorus [[id number]]  (Normal) Collected: 07/19/25 0324    Specimen: Blood Updated: 07/19/25 0409     Phosphorus 3.0 mg/dL     CBC & Differential [[id number]]  (Abnormal) Collected: 07/19/25 0324    Specimen: Blood Updated: 07/19/25 0347    Narrative:      The following orders were created for panel order CBC & Differential.  Procedure                               Abnormality         Status                     ---------                               -----------         ------                     CBC Auto Differential[[id number]]        Abnormal            Final result                 Please view results for these tests on the individual orders.    CBC Auto Differential [[id number]]  (Abnormal) Collected: 07/19/25 0324    Specimen: Blood Updated: 07/19/25 0347     WBC 6.08 10*3/mm3      RBC 3.07 10*6/mm3      Hemoglobin 8.9 g/dL      Hematocrit 25.7 %      MCV 83.7 fL      MCH 29.0 pg      MCHC 34.6 g/dL      RDW 12.8 %      RDW-SD 39.1 fl      MPV 9.2 fL      Platelets 269 10*3/mm3      Neutrophil % 53.2 %      Lymphocyte % 31.7 %      Monocyte % 12.5 %      Eosinophil % 1.8 %      Basophil % 0.5 %      Immature Grans % 0.3 %      Neutrophils, Absolute 3.23 10*3/mm3      Lymphocytes, Absolute 1.93 10*3/mm3      Monocytes, Absolute 0.76 10*3/mm3      Eosinophils, Absolute 0.11 10*3/mm3       Basophils, Absolute 0.03 10*3/mm3      Immature Grans, Absolute 0.02 10*3/mm3      nRBC 0.0 /100 WBC     Sodium [[id number]]  (Abnormal) Collected: 07/18/25 2329    Specimen: Blood Updated: 07/19/25 0001     Sodium 123 mmol/L     Sodium [[id number]]  (Abnormal) Collected: 07/18/25 2026    Specimen: Blood Updated: 07/18/25 2145     Sodium 120 mmol/L     Potassium [[id number]]  (Normal) Collected: 07/18/25 2026    Specimen: Blood Updated: 07/18/25 2145     Potassium 3.5 mmol/L     Sodium [[id number]]  (Abnormal) Collected: 07/18/25 1701    Specimen: Blood Updated: 07/18/25 1734     Sodium 114 mmol/L              No radiology results for the last day      Result Review:  I have personally reviewed the results from the time of this admission to 7/19/2025 13:45 CDT and agree with these findings:  [x]  Laboratory list / accordion  []  Microbiology  []  Radiology  []  EKG/Telemetry   []  Cardiology/Vascular   []  Pathology  []  Old records  []  Other:    Assessment/Plan   Assessment  Active Hospital Problems    Diagnosis     **Hyponatremia     Severe protein-calorie malnutrition        Treatment Plan  The patient is a 75yo woman with a history of HTN who had recently been started on HCTZ who was admitted on 7/16 for hypoNa (Na 111 on presentation). The patient has had no neurologic deficits related to her hypoNa, and her Na is correcting appropriately.    HypoNatremia  -Due to some combination of HCTZ, volume contraction from her recent bout of vomiting, and SIADH related to her vomiting.   - Na on admission 111, urine Na 50 and urine osmolarity 272, TSH 2.7 on 5/19/2025  -Na corrected from 111 to 122, so NS was stopped and she was given D5W and desmopressin. Na trended down to 113 7/18.  3% hypertonic saline was initiated 7/18 in am and she received 60 cc x3. With improvement in Na to 116 and 121 7/19. During 7/19 she autocorrected to 128.   PLAN:  -we holding on fluid right now due to autocorrection  - encourage PO intake  -  patient can be transferred to the floor  -HCTZ d/c'ed    2. NV  -The patient had 3-4 days of vomiting prior to admission. No vomiting since admission.  PLAN:  -Follow  - As needed Zofran if needed    3.  Hypertension  -Normotensive on this admission  Plan:  -Discontinue hydrochlorothiazide  - Will address blood pressure medication if becomes hypertensive    4.  Anxiety on this admission  - On home Zoloft  Plan:  - As needed hydroxyzine if needed    5.  COPD, not in acute exacerbation  - Restart home inhaler regiment with Symbicort and montelukast    CCT 35 min    Electronically signed by Niharika Leavitt DO on 7/19/2025 at 13:45 CDT

## 2025-07-20 VITALS
HEIGHT: 64 IN | SYSTOLIC BLOOD PRESSURE: 139 MMHG | DIASTOLIC BLOOD PRESSURE: 57 MMHG | TEMPERATURE: 98.2 F | BODY MASS INDEX: 14.98 KG/M2 | WEIGHT: 87.74 LBS | HEART RATE: 82 BPM | RESPIRATION RATE: 16 BRPM | OXYGEN SATURATION: 97 %

## 2025-07-20 LAB
ANION GAP SERPL CALCULATED.3IONS-SCNC: 10 MMOL/L (ref 5–15)
BUN SERPL-MCNC: 8.3 MG/DL (ref 8–23)
BUN/CREAT SERPL: 16 (ref 7–25)
CALCIUM SPEC-SCNC: 8.9 MG/DL (ref 8.6–10.5)
CHLORIDE SERPL-SCNC: 96 MMOL/L (ref 98–107)
CO2 SERPL-SCNC: 22 MMOL/L (ref 22–29)
CREAT SERPL-MCNC: 0.52 MG/DL (ref 0.57–1)
DEPRECATED RDW RBC AUTO: 42.6 FL (ref 37–54)
EGFRCR SERPLBLD CKD-EPI 2021: 96.4 ML/MIN/1.73
ERYTHROCYTE [DISTWIDTH] IN BLOOD BY AUTOMATED COUNT: 13.6 % (ref 12.3–15.4)
GLUCOSE SERPL-MCNC: 104 MG/DL (ref 65–99)
HCT VFR BLD AUTO: 29.2 % (ref 34–46.6)
HGB BLD-MCNC: 9.7 G/DL (ref 12–15.9)
MAGNESIUM SERPL-MCNC: 1.8 MG/DL (ref 1.6–2.4)
MCH RBC QN AUTO: 28.7 PG (ref 26.6–33)
MCHC RBC AUTO-ENTMCNC: 33.2 G/DL (ref 31.5–35.7)
MCV RBC AUTO: 86.4 FL (ref 79–97)
PLATELET # BLD AUTO: 294 10*3/MM3 (ref 140–450)
PMV BLD AUTO: 9.4 FL (ref 6–12)
POTASSIUM SERPL-SCNC: 4 MMOL/L (ref 3.5–5.2)
RBC # BLD AUTO: 3.38 10*6/MM3 (ref 3.77–5.28)
SODIUM SERPL-SCNC: 128 MMOL/L (ref 136–145)
WBC NRBC COR # BLD AUTO: 7.13 10*3/MM3 (ref 3.4–10.8)

## 2025-07-20 PROCEDURE — 97110 THERAPEUTIC EXERCISES: CPT

## 2025-07-20 PROCEDURE — 80048 BASIC METABOLIC PNL TOTAL CA: CPT | Performed by: STUDENT IN AN ORGANIZED HEALTH CARE EDUCATION/TRAINING PROGRAM

## 2025-07-20 PROCEDURE — 97116 GAIT TRAINING THERAPY: CPT

## 2025-07-20 PROCEDURE — 94799 UNLISTED PULMONARY SVC/PX: CPT

## 2025-07-20 PROCEDURE — 83735 ASSAY OF MAGNESIUM: CPT | Performed by: STUDENT IN AN ORGANIZED HEALTH CARE EDUCATION/TRAINING PROGRAM

## 2025-07-20 PROCEDURE — 85027 COMPLETE CBC AUTOMATED: CPT | Performed by: STUDENT IN AN ORGANIZED HEALTH CARE EDUCATION/TRAINING PROGRAM

## 2025-07-20 RX ORDER — HYDROCODONE BITARTRATE AND ACETAMINOPHEN 10; 325 MG/1; MG/1
1 TABLET ORAL EVERY 6 HOURS PRN
Status: DISCONTINUED | OUTPATIENT
Start: 2025-07-20 | End: 2025-07-20 | Stop reason: HOSPADM

## 2025-07-20 RX ORDER — AMLODIPINE BESYLATE 10 MG/1
10 TABLET ORAL
Status: DISCONTINUED | OUTPATIENT
Start: 2025-07-20 | End: 2025-07-20 | Stop reason: HOSPADM

## 2025-07-20 RX ORDER — AMLODIPINE BESYLATE 10 MG/1
10 TABLET ORAL
Qty: 30 TABLET | Refills: 0 | Status: SHIPPED | OUTPATIENT
Start: 2025-07-21

## 2025-07-20 RX ADMIN — PANTOPRAZOLE SODIUM 40 MG: 40 TABLET, DELAYED RELEASE ORAL at 08:23

## 2025-07-20 RX ADMIN — BUDESONIDE AND FORMOTEROL FUMARATE DIHYDRATE 2 PUFF: 160; 4.5 AEROSOL RESPIRATORY (INHALATION) at 06:29

## 2025-07-20 RX ADMIN — ASPIRIN 81 MG: 81 TABLET, COATED ORAL at 11:10

## 2025-07-20 RX ADMIN — AMLODIPINE BESYLATE 10 MG: 10 TABLET ORAL at 11:09

## 2025-07-20 NOTE — DISCHARGE SUMMARY
[facility] Medicine Services  DISCHARGE SUMMARY       Date of Admission: 7/16/2025  Date of Discharge:  7/20/2025  Primary Care Physician: Kaylan Kowalski MD    Presenting Problem/History of Present Illness:  Vomiting      Dehydration  Symptoms: vomiting     74 y.o. female with past medical history of primary HTN, hypothyroidism, HLD, bronchiectasis, depression and GERD, presented to the ED today with severe weakness after 4 days of N&V. Pt was in usual state of health when she became ill with N&V for past 4 days. Has had little to no oral intake. Family believed she was severely dehydrated and deconditioned as she does not eat or drink very much at her baseline. Upon arrival in the ED, pt was found to be severely hyponatremic with a Na+ level of 111. Has never had issues in the past with hyponatremia or N&V. Of note, pt did just start HCTZ as outpatient for her HTN. CT abd/pelvis with contrast indicated areas of thickening in ascending and descending colon, possible colitis vs underdistention. No other signs of infectious process. She did receive a dose of Rocephin in the ED and her K+ was replaced. The intensivist service was contacted by the ED physician to evaluate the patient for admission to critical care. Upon exam in ED, pt alert, no distress. Very hard of hearing and did not have hearing aids in at present. Family member at bedside able to relay information to her. BP and HR stable, no pressors. No oxygenation issues on RA. Pt will be admitted to Critical Care Medicine service for further treatment and investigation into her hyponatremia.   7/19  The patient is doing well this morning. Alert and interactive. No c/o. No vomiting overnight. Patient is feeling well this morning, interactive.   7/20  As before was admitted secondary to hyponatremia with nausea vomiting felt secondary to dehydration and HCTZ her hyponatremia has been resolving feeling better also CT was  showing questionable colitis her symptoms are getting better feeling better overall the patient blood pressure is mildly elevated we will start her on Norvasc avoid hydrochlorothiazide    Final Discharge Diagnoses:  Active Hospital Problems    Diagnosis     **Hyponatremia     Severe protein-calorie malnutrition        Consults: no    Procedures Performed: none     Pertinent Test Results:   No results found for this or any previous visit.      Imaging Results (All)       Procedure Component Value Units Date/Time    XR Chest 1 View [[id number]] Collected: 07/16/25 1148     Updated: 07/16/25 1151    Narrative:      EXAMINATION: XR CHEST 1 VW-     HISTORY: Nausea and vomiting.     Images are stored in PACS per institutional protocol.     1 view chest x-ray.     Heart size is normal.  The mediastinum is within normal limits.     The lungs are moderately hyperexpanded with no pneumonia or  pneumothorax.  Moderate chronic appearing interstitial disease with a few calcified  granulomas.  No congestive failure changes.       Impression:      1. Chronic lung changes.  2. No acute infiltrate.                 This report was signed and finalized on 7/16/2025 11:48 AM by Dr. Romeo Agrawal MD.       CT Abdomen Pelvis With Contrast [[id number]] Collected: 07/16/25 1130     Updated: 07/16/25 1150    Narrative:      EXAMINATION: CT ABDOMEN PELVIS W CONTRAST-        HISTORY:n/v     In order to have a CT radiation dose as low as reasonably achievable  Automated Exposure Control was utilized for adjustment of the mA and/or  KV according to patient size.     Total DLP = 99.28 mGy.cm     The CT scan of the abdomen and pelvis is performed after intravenous  contrast enhancement.     Images are acquired in axial plane and subsequent reconstruction in  coronal and sagittal planes.     Comparison is made with a similar previous study dated 6/12/2023.     Limited visualized lung bases show chronic emphysematous changes. A  partially visualized  nodule in the right middle lobe was noted in the  previous CT scan of the chest dated 5/1/2025 and is unchanged. There is  moderate size sliding-type hiatal hernia.     There is fatty infiltration of the liver. No focal intrinsic  abnormality. The spleen is normal.     No radiopaque gallstones.     The pancreas is normal.     The adrenal glands bilaterally are normal.     The kidneys bilaterally are normal. No mass. No radiopaque calculi. No  hydronephrosis. Ureters are not well visualized. The urinary bladder is  moderately distended. No intrinsic abnormality.     The intra-abdominal stomach is unremarkable. A few hyperdense objects in  the stomach represent ingested pills. Duodenum is normal. Small bowel is  nondilated. The appendix is not optimally visualized. However no changes  in the expected area of the appendix to suggest appendicitis. Moderately  gas distended cecum is displaced medially and anteriorly adjacent to the  lower anterior abdominal wall. There is moderate asymmetrical thickening  of the wall of the ascending colon adjacent and distal to the cecum.  This may be due to incomplete distention. However possibility of an  inflammatory or a neoplastic process in this area may not be excluded.  There is mild surrounding extraperitoneal fat infiltration. No free  fluid or free air. Moderate gas and stool is seen in the remaining  colon. There is diverticulosis of the colon. No finding to suggest  diverticulitis. There is moderate thickening of the wall of the  descending colon which probably due to incomplete distention. There is  surrounding mild to moderate peritoneal fat infiltration. Possibility of  acute nonspecific colitis not excluded. No evidence of obstruction.     Severe atheromatous changes of the abdominal aorta and iliac arteries.  No aneurysmal dilatation. Large calcified plaque is seen at the origin  of the celiac trunk. The proximal part of the celiac artery is not well  visualized or  evaluated. There is acute angulation of the proximal  celiac trunk which may be due to extrinsic pressure from the median  arcuate ligament of the diaphragm.     There is no evidence of abdominal or pelvic lymphadenopathy.     Images reviewed in bone window show chronic degenerative changes of the  lumbar spine. No acute bony abnormality.       Impression:      1. An area of circumferential wall thickening and narrowing of the  ascending colon adjacent and distal to the cecum may represent an  inflammatory/infectious process. No obstruction. This may partly be due  to incomplete distention. Further follow-up may be obtained.  2. A long segment of circumferential thickening of the wall of the  descending colon with mild surrounding peritoneal infiltration may  represent acute nonspecific colitis?. This may partly be due to  incomplete distention.  3. Diverticulosis of the colon. No finding to suggest diverticulitis.  4. The appendix is not optimally visualized. There are no findings in  the expected area of the appendix to suggest appendicitis.  5. Other nonacute findings similar to the previous study.                                            This report was signed and finalized on 7/16/2025 11:47 AM by Dr. Deisy Bolaños MD.             LAB RESULTS:      Lab 07/20/25  0816 07/19/25  0810 07/19/25  0324 07/18/25  0345 07/17/25  0636 07/16/25  1037   WBC 7.13  --  6.08 7.04 6.28 6.85   HEMOGLOBIN 9.7* 9.6* 8.9* 10.1* 10.0* 11.6*   HEMATOCRIT 29.2* 29.5* 25.7* 30.0* 28.7* 32.6*   PLATELETS 294  --  269 267 304 371   NEUTROS ABS  --   --  3.23 3.57 2.56 3.63   IMMATURE GRANS (ABS)  --   --  0.02 0.05 0.02 0.02   LYMPHS ABS  --   --  1.93 2.53 2.55 2.37   MONOS ABS  --   --  0.76 0.69 0.98* 0.76   EOS ABS  --   --  0.11 0.16 0.12 0.04   MCV 86.4  --  83.7 85.0 82.0 80.9         Lab 07/20/25  0817 07/19/25  2315 07/19/25  1705 07/19/25  1157 07/19/25  0810 07/19/25  0324 07/18/25  2329 07/18/25  2026 07/18/25  0931  07/18/25  0345 07/17/25  1244 07/17/25  0636 07/16/25  1619 07/16/25  1037   SODIUM 128* 126* 128* 128* 128* 125*   < > 120*   < > 113*   < > 120*   < > 111*   POTASSIUM 4.0  --   --   --  4.8 3.9  --  3.5  --  4.0  --  4.1   < > 3.2*   CHLORIDE 96*  --   --   --   --  94*  --   --   --  83*  --  87*  --  73*   CO2 22.0  --   --   --   --  22.0  --   --   --  18.0*  --  21.0*  --  22.0   ANION GAP 10.0  --   --   --   --  9.0  --   --   --  12.0  --  12.0  --  16.0*   BUN 8.3  --   --   --   --  5.9*  --   --   --  6.9*  --  7.4*  --  10.0   CREATININE 0.52*  --   --   --   --  0.48*  --   --   --  0.54*  --  0.65  --  0.74   EGFR 96.4  --   --   --   --  98.3  --   --   --  95.6  --  91.4  --  84.0   GLUCOSE 104*  --   --   --   --  92  --   --   --  110*  --  92  --  120*   CALCIUM 8.9  --   --   --   --  8.4*  --   --   --  8.2*  --  8.7  --  9.5   MAGNESIUM 1.8  --   --   --   --  1.7  --   --   --  1.7  --  1.8  --   --    PHOSPHORUS  --   --   --   --   --  3.0  --   --   --  2.7  --  2.7  --   --     < > = values in this interval not displayed.         Lab 07/16/25  1037   TOTAL PROTEIN 8.0   ALBUMIN 4.7   GLOBULIN 3.3   ALT (SGPT) 10   AST (SGOT) 30   BILIRUBIN 1.0   ALK PHOS 64   LIPASE 68*                     Brief Urine Lab Results  (Last result in the past 365 days)        Color   Clarity   Blood   Leuk Est   Nitrite   Protein   CREAT   Urine HCG        07/16/25 1131 Yellow   Clear   Negative   Negative   Negative   Negative                 Microbiology Results (last 10 days)       ** No results found for the last 240 hours. **            Hospital Course:   Vomiting      Dehydration  Symptoms: vomiting     74 y.o. female with past medical history of primary HTN, hypothyroidism, HLD, bronchiectasis, depression and GERD, presented to the ED today with severe weakness after 4 days of N&V. Pt was in usual state of health when she became ill with N&V for past 4 days. Has had little to no oral intake. Family  "believed she was severely dehydrated and deconditioned as she does not eat or drink very much at her baseline. Upon arrival in the ED, pt was found to be severely hyponatremic with a Na+ level of 111. Has never had issues in the past with hyponatremia or N&V. Of note, pt did just start HCTZ as outpatient for her HTN. CT abd/pelvis with contrast indicated areas of thickening in ascending and descending colon, possible colitis vs underdistention. No other signs of infectious process. She did receive a dose of Rocephin in the ED and her K+ was replaced. The intensivist service was contacted by the ED physician to evaluate the patient for admission to critical care. Upon exam in ED, pt alert, no distress. Very hard of hearing and did not have hearing aids in at present. Family member at bedside able to relay information to her. BP and HR stable, no pressors. No oxygenation issues on RA. Pt will be admitted to Critical Care Medicine service for further treatment and investigation into her hyponatremia.   7/19  The patient is doing well this morning. Alert and interactive. No c/o. No vomiting overnight. Patient is feeling well this morning, interactive.   7/20  As before was admitted secondary to hyponatremia with nausea vomiting felt secondary to dehydration and HCTZ her hyponatremia has been resolving feeling better also CT was showing questionable colitis her symptoms are getting better feeling better overall the patient blood pressure is mildly elevated we will start her on Norvasc avoid hydrochlorothiazide    Physical Exam on Discharge:  /57 (BP Location: Right arm, Patient Position: Sitting)   Pulse 82   Temp 98.2 °F (36.8 °C) (Oral)   Resp 16   Ht 162.6 cm (64\")   Wt 39.8 kg (87 lb 11.9 oz)   LMP  (LMP Unknown)   SpO2 97%   BMI 15.06 kg/m²   Physical Exam      Condition on Discharge: stable     Discharge Disposition:  Home or Self Care    Discharge Medications:     Discharge Medications        New " Medications        Instructions Start Date   amLODIPine 10 MG tablet  Commonly known as: NORVASC   10 mg, Oral, Every 24 Hours Scheduled   Start Date: July 21, 2025            Continue These Medications        Instructions Start Date   Breztri Aerosphere 160-9-4.8 MCG/ACT aerosol inhaler  Generic drug: Budeson-Glycopyrrol-Formoterol   2 puffs, Inhalation, 2 Times Daily      mirtazapine 7.5 MG tablet  Commonly known as: REMERON   7.5 mg, Oral, Nightly      montelukast 10 MG tablet  Commonly known as: SINGULAIR   10 mg, Oral, Nightly      sertraline 50 MG tablet  Commonly known as: ZOLOFT   1 tablet, Oral, Every Night at Bedtime             Stop These Medications      hydroCHLOROthiazide 12.5 MG tablet              Discharge Diet:     Activity at Discharge:     Follow-up Appointments:   No future appointments.    Test Results Pending at Discharge: no    Electronically signed by Kacy Marin MD, 07/20/25, 12:27 CDT.    Time: 45 minutes.

## 2025-07-20 NOTE — THERAPY TREATMENT NOTE
Acute Care - Physical Therapy Treatment Note  [facility]     Patient Name: Leila Rubin  : 1950  MRN: [id number]  Today's Date: 2025      Visit Dx:     ICD-10-CM ICD-9-CM   1. Nausea and vomiting, unspecified vomiting type  R11.2 787.01   2. Hyponatremia  E87.1 276.1   3. Hypochloremia  E87.8 276.9   4. Hypokalemia  E87.6 276.8   5. Colitis  K52.9 558.9   6. Impaired mobility [Z74.09]  Z74.09 799.89     Patient Active Problem List   Diagnosis    Bronchiectasis without complication    Personal history of nicotine dependence    Hiatal hernia    GERD without esophagitis    History of environmental allergies    Hyponatremia    Severe protein-calorie malnutrition     Past Medical History:   Diagnosis Date    Bronchiectasis without complication 2023    GERD without esophagitis 2023    Hiatal hernia 2023    History of environmental allergies 2023    Multiple lung nodules on CT 2023    Personal history of nicotine dependence 2023     Past Surgical History:   Procedure Laterality Date    COLONOSCOPY  03/10/2014    Tics otherwise normal exam repeat in 5 years    ENDOSCOPY  01/15/2013    Moderate chronic active inflammation negative for Intestinal Metaplasia    EXCISION / BIOPSY BREAST / NIPPLE / DUCT Left     benign    HYSTERECTOMY      OOPHORECTOMY       PT Assessment (Last 12 Hours)       PT Evaluation and Treatment       Row Name 25 0746          Physical Therapy Time and Intention    Subjective Information no complaints  -     Document Type therapy note (daily note)  -     Mode of Treatment physical therapy  -       Row Name 25 0746          General Information    Existing Precautions/Restrictions fall  -     Limitations/Impairments hearing  -       Row Name 25 0746          Pain    Pretreatment Pain Rating 0/10 - no pain  -     Posttreatment Pain Rating 0/10 - no pain  -       Row Name 25 0746          Bed Mobility    Supine-Sit  [place] (Bed Mobility) standby assist  -     Sit-Supine [place] (Bed Mobility) --  chair  -       Row Name 07/20/25 0746          Transfers    Transfers toilet transfer  -       Row Name 07/20/25 0746          Sit-Stand Transfer    Sit-Stand [place] (Transfers) contact guard;verbal cues  -       Row Name 07/20/25 0746          Stand-Sit Transfer    Stand-Sit [place] (Transfers) contact guard;verbal cues  -[facility] Name 07/20/25 0746          Toilet Transfer    [place] Level (Toilet Transfer) contact guard;verbal cues  -     Assistive Device (Toilet Transfer) commode;grab bars/safety frame;walker, front-wheeled  -[facility] Name 07/20/25 0746          Gait/Stairs (Locomotion)    [place] Level (Gait) contact guard;verbal cues  -     Assistive Device (Gait) walker, front-wheeled  -     Distance in Feet (Gait) 50  -       Row Name 07/20/25 0746          Motor Skills    Comments, Therapeutic Exercise BLE AROM 10 x 2 reps  -     Additional Documentation Comments, Therapeutic Exercise (Row)  -[facility] Name 07/20/25 0746          Plan of Care Review    Plan of Care Reviewed With patient  -     Progress improving  -     Outcome Evaluation pt trans to EOB cga, BLE AROM, sit-stand cga, toilet trans cga, pt amb 50 feet rwx cga, trans to chair, pt would benefit from cont therapy  [facility] Name 07/20/25 0746          Positioning and Restraints    Pre-Treatment Position in bed  -     Post Treatment Position chair  -     In Chair notified nsg;reclined;call light within reach;encouraged to call for assist;exit alarm on;with family/caregiver  [facility]               User Key  (r) = Recorded By, (t) = Taken By, (c) = Cosigned By      Initials Name Provider Type     Larissa Bartlett, PTA Physical Therapist Assistant                    Physical Therapy Education       Title: PT OT SLP Therapies (Done)       Topic: Physical Therapy (Done)       Point: Mobility training  (Done)       Learning Progress Summary            Patient Acceptance, E,TB, VU,DU by  at 7/18/2025 1415    Comment: benefits of PT and mobility, AD positioning   Family Acceptance, E,TB, VU,DU by  at 7/18/2025 1415    Comment: benefits of PT and mobility, AD positioning                      Point: Home exercise program (Done)       Learning Progress Summary            Patient Acceptance, E,TB, VU,DU by  at 7/18/2025 1415    Comment: benefits of PT and mobility, AD positioning   Family Acceptance, E,TB, VU,DU by  at 7/18/2025 1415    Comment: benefits of PT and mobility, AD positioning                      Point: Body mechanics (Done)       Learning Progress Summary            Patient Acceptance, E,TB, VU,DU by  at 7/18/2025 1415    Comment: benefits of PT and mobility, AD positioning   Family Acceptance, E,TB, VU,DU by  at 7/18/2025 1415    Comment: benefits of PT and mobility, AD positioning                      Point: Precautions (Done)       Learning Progress Summary            Patient Acceptance, E,TB, VU,DU by  at 7/18/2025 1415    Comment: benefits of PT and mobility, AD positioning   Family Acceptance, E,TB, VU,DU by  at 7/18/2025 1415    Comment: benefits of PT and mobility, AD positioning                                      User Key       Initials Effective Dates Name Provider Type Discipline     04/21/25 -  Gemma Beltrán, PT Student PT Student PT                  PT Recommendation and Plan     Plan of Care Reviewed With: patient  Progress: improving  Outcome Evaluation: pt trans to EOB cga, BLE AROM, sit-stand cga, toilet trans cga, pt amb 50 feet rwx cga, trans to chair, pt would benefit from cont therapy   Outcome Measures       Row Name 07/20/25 0800             How much help from another person do you currently need...    Turning from your back to your side while in flat bed without using bedrails? 4  -AH      Moving from lying on back to sitting on the side of a flat bed without  bedrails? 4  -AH      Moving to and from a bed to a chair (including a wheelchair)? 3  -AH      Standing up from a chair using your arms (e.g., wheelchair, bedside chair)? 3  -AH      Climbing 3-5 steps with a railing? 3  -AH      To walk in hospital room? 3  -AH      AM-PAC 6 Clicks Score (PT) 20  -         Functional Assessment    Outcome Measure Options AM-PAC 6 Clicks Basic Mobility (PT)  -                User Key  (r) = Recorded By, (t) = Taken By, (c) = Cosigned By      Initials Name Provider Type    Larissa Gould PTA Physical Therapist Assistant                     Time Calculation:    PT Charges       Row Name 07/20/25 0814             Time Calculation    Start Time 0746  -      Stop Time 0810  -      Time Calculation (min) 24 min  -      PT Received On 07/20/25  -         Time Calculation- PT    Total Timed Code Minutes- PT 24 minute(s)  -         Timed Charges    91386 - PT Therapeutic Exercise Minutes 10  -      55621 - Gait Training Minutes  14  -AH         Total Minutes    Timed Charges Total Minutes 24  -AH       Total Minutes 24  -AH                User Key  (r) = Recorded By, (t) = Taken By, (c) = Cosigned By      Initials Name Provider Type     Larissa Bartlett PTA Physical Therapist Assistant                  Therapy Charges for Today       Code Description Service Date Service Provider Modifiers Qty    35899092051 HC PT THER PROC EA 15 MIN 7/20/2025 Larissa Bartlett PTA GP 1    65563847678 HC GAIT TRAINING EA 15 MIN 7/20/2025 Larissa Bartlett PTA GP 1            PT G-Codes  Outcome Measure Options: AM-PAC 6 Clicks Basic Mobility (PT)  AM-PAC 6 Clicks Score (PT): 20  AM-PAC 6 Clicks Score (OT): 20    Larissa Bartlett PTA  7/20/2025

## 2025-07-20 NOTE — PAYOR COMM NOTE
"HI HOME 7-20-25    Leila Castrejon (74 y.o. Female)       Date of Birth   1950    Social Security Number       [place]   [address] [place] [address]    Home Phone   [phone]    MRN   [id number]       [place]   Other    Marital Status                               Admission Date   7/16/2025    Admission Type   Emergency    Admitting Provider   Wilber Aguilar MD    Attending Provider       Department, Room/Bed   [facility] 3A, 330/1       Discharge Date   7/20/2025    Discharge Disposition   Home or Self Care    Discharge Destination                                 Attending Provider: (none)   Allergies: No Known Allergies    Isolation: None   Infection: None   Code Status: Prior    Ht: 162.6 cm (64\")   Wt: 39.8 kg (87 lb 11.9 oz)    Admission Cmt: None   Principal Problem: Hyponatremia [E87.1]                   Active Insurance as of 7/16/2025       Primary Coverage       Payor Plan Insurance Group Employer/Plan Group    ANTH MEDICARE REPLACEMENT [facility] MEDICARE ADVANTAGE O [id number]       Payor Plan Address Payor Plan Phone Number Payor Plan Fax Number Effective Dates    [address] [phone]  1/1/2025 - None Entered    [place] [address]         Subscriber Name Subscriber Birth Date Member ID       LEILA CASTREJON 1950 [id number]                     Emergency Contacts        (Rel.) Home Phone Work Phone Mobile Phone    Lindsay Mejía (Daughter) [phone] -- [phone]    Lor Andino (Grandchild) [phone] -- [phone]    ScottieSteven (Son) -- -- [phone]                 Discharge Summary        Kacy Marin MD at 07/20/25 1227                [facility] Medicine Services  DISCHARGE SUMMARY       Date of Admission: 7/16/2025  Date of Discharge:  7/20/2025  Primary Care Physician: Kaylan Kowalski MD    Presenting Problem/History of Present " Illness:  Vomiting      Dehydration  Symptoms: vomiting     74 y.o. female with past medical history of primary HTN, hypothyroidism, HLD, bronchiectasis, depression and GERD, presented to the ED today with severe weakness after 4 days of N&V. Pt was in usual state of health when she became ill with N&V for past 4 days. Has had little to no oral intake. Family believed she was severely dehydrated and deconditioned as she does not eat or drink very much at her baseline. Upon arrival in the ED, pt was found to be severely hyponatremic with a Na+ level of 111. Has never had issues in the past with hyponatremia or N&V. Of note, pt did just start HCTZ as outpatient for her HTN. CT abd/pelvis with contrast indicated areas of thickening in ascending and descending colon, possible colitis vs underdistention. No other signs of infectious process. She did receive a dose of Rocephin in the ED and her K+ was replaced. The intensivist service was contacted by the ED physician to evaluate the patient for admission to critical care. Upon exam in ED, pt alert, no distress. Very hard of hearing and did not have hearing aids in at present. Family member at bedside able to relay information to her. BP and HR stable, no pressors. No oxygenation issues on RA. Pt will be admitted to Critical Care Medicine service for further treatment and investigation into her hyponatremia.   7/19  The patient is doing well this morning. Alert and interactive. No c/o. No vomiting overnight. Patient is feeling well this morning, interactive.   7/20  As before was admitted secondary to hyponatremia with nausea vomiting felt secondary to dehydration and HCTZ her hyponatremia has been resolving feeling better also CT was showing questionable colitis her symptoms are getting better feeling better overall the patient blood pressure is mildly elevated we will start her on Norvasc avoid hydrochlorothiazide    Final Discharge Diagnoses:  Active Hospital Problems     Diagnosis     **Hyponatremia     Severe protein-calorie malnutrition        Consults: no    Procedures Performed: none     Pertinent Test Results:   No results found for this or any previous visit.      Imaging Results (All)       Procedure Component Value Units Date/Time    XR Chest 1 View [[id number]] Collected: 07/16/25 1148     Updated: 07/16/25 1151    Narrative:      EXAMINATION: XR CHEST 1 VW-     HISTORY: Nausea and vomiting.     Images are stored in PACS per institutional protocol.     1 view chest x-ray.     Heart size is normal.  The mediastinum is within normal limits.     The lungs are moderately hyperexpanded with no pneumonia or  pneumothorax.  Moderate chronic appearing interstitial disease with a few calcified  granulomas.  No congestive failure changes.       Impression:      1. Chronic lung changes.  2. No acute infiltrate.                 This report was signed and finalized on 7/16/2025 11:48 AM by Dr. Romeo Agrawal MD.       CT Abdomen Pelvis With Contrast [[id number]] Collected: 07/16/25 1130     Updated: 07/16/25 1150    Narrative:      EXAMINATION: CT ABDOMEN PELVIS W CONTRAST-        HISTORY:n/v     In order to have a CT radiation dose as low as reasonably achievable  Automated Exposure Control was utilized for adjustment of the mA and/or  KV according to patient size.     Total DLP = 99.28 mGy.cm     The CT scan of the abdomen and pelvis is performed after intravenous  contrast enhancement.     Images are acquired in axial plane and subsequent reconstruction in  coronal and sagittal planes.     Comparison is made with a similar previous study dated 6/12/2023.     Limited visualized lung bases show chronic emphysematous changes. A  partially visualized nodule in the right middle lobe was noted in the  previous CT scan of the chest dated 5/1/2025 and is unchanged. There is  moderate size sliding-type hiatal hernia.     There is fatty infiltration of the liver. No focal  intrinsic  abnormality. The spleen is normal.     No radiopaque gallstones.     The pancreas is normal.     The adrenal glands bilaterally are normal.     The kidneys bilaterally are normal. No mass. No radiopaque calculi. No  hydronephrosis. Ureters are not well visualized. The urinary bladder is  moderately distended. No intrinsic abnormality.     The intra-abdominal stomach is unremarkable. A few hyperdense objects in  the stomach represent ingested pills. Duodenum is normal. Small bowel is  nondilated. The appendix is not optimally visualized. However no changes  in the expected area of the appendix to suggest appendicitis. Moderately  gas distended cecum is displaced medially and anteriorly adjacent to the  lower anterior abdominal wall. There is moderate asymmetrical thickening  of the wall of the ascending colon adjacent and distal to the cecum.  This may be due to incomplete distention. However possibility of an  inflammatory or a neoplastic process in this area may not be excluded.  There is mild surrounding extraperitoneal fat infiltration. No free  fluid or free air. Moderate gas and stool is seen in the remaining  colon. There is diverticulosis of the colon. No finding to suggest  diverticulitis. There is moderate thickening of the wall of the  descending colon which probably due to incomplete distention. There is  surrounding mild to moderate peritoneal fat infiltration. Possibility of  acute nonspecific colitis not excluded. No evidence of obstruction.     Severe atheromatous changes of the abdominal aorta and iliac arteries.  No aneurysmal dilatation. Large calcified plaque is seen at the origin  of the celiac trunk. The proximal part of the celiac artery is not well  visualized or evaluated. There is acute angulation of the proximal  celiac trunk which may be due to extrinsic pressure from the median  arcuate ligament of the diaphragm.     There is no evidence of abdominal or pelvic  lymphadenopathy.     Images reviewed in bone window show chronic degenerative changes of the  lumbar spine. No acute bony abnormality.       Impression:      1. An area of circumferential wall thickening and narrowing of the  ascending colon adjacent and distal to the cecum may represent an  inflammatory/infectious process. No obstruction. This may partly be due  to incomplete distention. Further follow-up may be obtained.  2. A long segment of circumferential thickening of the wall of the  descending colon with mild surrounding peritoneal infiltration may  represent acute nonspecific colitis?. This may partly be due to  incomplete distention.  3. Diverticulosis of the colon. No finding to suggest diverticulitis.  4. The appendix is not optimally visualized. There are no findings in  the expected area of the appendix to suggest appendicitis.  5. Other nonacute findings similar to the previous study.                                            This report was signed and finalized on 7/16/2025 11:47 AM by Dr. Deisy Bolaños MD.             LAB RESULTS:      Lab 07/20/25  0816 07/19/25  0810 07/19/25  0324 07/18/25  0345 07/17/25  0636 07/16/25  1037   WBC 7.13  --  6.08 7.04 6.28 6.85   HEMOGLOBIN 9.7* 9.6* 8.9* 10.1* 10.0* 11.6*   HEMATOCRIT 29.2* 29.5* 25.7* 30.0* 28.7* 32.6*   PLATELETS 294  --  269 267 304 371   NEUTROS ABS  --   --  3.23 3.57 2.56 3.63   IMMATURE GRANS (ABS)  --   --  0.02 0.05 0.02 0.02   LYMPHS ABS  --   --  1.93 2.53 2.55 2.37   MONOS ABS  --   --  0.76 0.69 0.98* 0.76   EOS ABS  --   --  0.11 0.16 0.12 0.04   MCV 86.4  --  83.7 85.0 82.0 80.9         Lab 07/20/25  0817 07/19/25  2315 07/19/25  1705 07/19/25  1157 07/19/25  0810 07/19/25  0324 07/18/25  2329 07/18/25  2026 07/18/25  0931 07/18/25  0345 07/17/25  1244 07/17/25  0636 07/16/25  1619 07/16/25  1037   SODIUM 128* 126* 128* 128* 128* 125*   < > 120*   < > 113*   < > 120*   < > 111*   POTASSIUM 4.0  --   --   --  4.8 3.9  --  3.5   --  4.0  --  4.1   < > 3.2*   CHLORIDE 96*  --   --   --   --  94*  --   --   --  83*  --  87*  --  73*   CO2 22.0  --   --   --   --  22.0  --   --   --  18.0*  --  21.0*  --  22.0   ANION GAP 10.0  --   --   --   --  9.0  --   --   --  12.0  --  12.0  --  16.0*   BUN 8.3  --   --   --   --  5.9*  --   --   --  6.9*  --  7.4*  --  10.0   CREATININE 0.52*  --   --   --   --  0.48*  --   --   --  0.54*  --  0.65  --  0.74   EGFR 96.4  --   --   --   --  98.3  --   --   --  95.6  --  91.4  --  84.0   GLUCOSE 104*  --   --   --   --  92  --   --   --  110*  --  92  --  120*   CALCIUM 8.9  --   --   --   --  8.4*  --   --   --  8.2*  --  8.7  --  9.5   MAGNESIUM 1.8  --   --   --   --  1.7  --   --   --  1.7  --  1.8  --   --    PHOSPHORUS  --   --   --   --   --  3.0  --   --   --  2.7  --  2.7  --   --     < > = values in this interval not displayed.         Lab 07/16/25  1037   TOTAL PROTEIN 8.0   ALBUMIN 4.7   GLOBULIN 3.3   ALT (SGPT) 10   AST (SGOT) 30   BILIRUBIN 1.0   ALK PHOS 64   LIPASE 68*                     Brief Urine Lab Results  (Last result in the past 365 days)        Color   Clarity   Blood   Leuk Est   Nitrite   Protein   CREAT   Urine HCG        07/16/25 1131 Yellow   Clear   Negative   Negative   Negative   Negative                 Microbiology Results (last 10 days)       ** No results found for the last 240 hours. **            Hospital Course:   Vomiting      Dehydration  Symptoms: vomiting     74 y.o. female with past medical history of primary HTN, hypothyroidism, HLD, bronchiectasis, depression and GERD, presented to the ED today with severe weakness after 4 days of N&V. Pt was in usual state of health when she became ill with N&V for past 4 days. Has had little to no oral intake. Family believed she was severely dehydrated and deconditioned as she does not eat or drink very much at her baseline. Upon arrival in the ED, pt was found to be severely hyponatremic with a Na+ level of 111. Has  "never had issues in the past with hyponatremia or N&V. Of note, pt did just start HCTZ as outpatient for her HTN. CT abd/pelvis with contrast indicated areas of thickening in ascending and descending colon, possible colitis vs underdistention. No other signs of infectious process. She did receive a dose of Rocephin in the ED and her K+ was replaced. The intensivist service was contacted by the ED physician to evaluate the patient for admission to critical care. Upon exam in ED, pt alert, no distress. Very hard of hearing and did not have hearing aids in at present. Family member at bedside able to relay information to her. BP and HR stable, no pressors. No oxygenation issues on RA. Pt will be admitted to Critical Care Medicine service for further treatment and investigation into her hyponatremia.   7/19  The patient is doing well this morning. Alert and interactive. No c/o. No vomiting overnight. Patient is feeling well this morning, interactive.   7/20  As before was admitted secondary to hyponatremia with nausea vomiting felt secondary to dehydration and HCTZ her hyponatremia has been resolving feeling better also CT was showing questionable colitis her symptoms are getting better feeling better overall the patient blood pressure is mildly elevated we will start her on Norvasc avoid hydrochlorothiazide    Physical Exam on Discharge:  /57 (BP Location: Right arm, Patient Position: Sitting)   Pulse 82   Temp 98.2 °F (36.8 °C) (Oral)   Resp 16   Ht 162.6 cm (64\")   Wt 39.8 kg (87 lb 11.9 oz)   LMP  (LMP Unknown)   SpO2 97%   BMI 15.06 kg/m²   Physical Exam      Condition on Discharge: stable     Discharge Disposition:  Home or Self Care    Discharge Medications:     Discharge Medications        New Medications        Instructions Start Date   amLODIPine 10 MG tablet  Commonly known as: NORVASC   10 mg, Oral, Every 24 Hours Scheduled   Start Date: July 21, 2025            Continue These Medications       "  Instructions Start Date   Breztri Aerosphere 160-9-4.8 MCG/ACT aerosol inhaler  Generic drug: Budeson-Glycopyrrol-Formoterol   2 puffs, Inhalation, 2 Times Daily      mirtazapine 7.5 MG tablet  Commonly known as: REMERON   7.5 mg, Oral, Nightly      montelukast 10 MG tablet  Commonly known as: SINGULAIR   10 mg, Oral, Nightly      sertraline 50 MG tablet  Commonly known as: ZOLOFT   1 tablet, Oral, Every Night at Bedtime             Stop These Medications      hydroCHLOROthiazide 12.5 MG tablet              Discharge Diet:     Activity at Discharge:     Follow-up Appointments:   No future appointments.    Test Results Pending at Discharge: no    Electronically signed by Kacy Marin MD, 07/20/25, 12:27 CDT.    Time: 45 minutes.         Electronically signed by Kacy Marin MD at 07/20/25 8990

## 2025-07-20 NOTE — PLAN OF CARE
Goal Outcome Evaluation:  Plan of Care Reviewed With: patient        Progress: improving  Outcome Evaluation: Patient A/O x 2. Increased confusion at night per family. VSS. On room air. Purewick in place, voiding well. Fluid restriction maintained. Family at bedside throughout the night. Safety maintained.

## 2025-07-20 NOTE — THERAPY DISCHARGE NOTE
Acute Care - Physical Therapy Discharge Summary  [facility]       Patient Name: Leila Rubin  : 1950  MRN: [id number]    Today's Date: 2025                 Admit Date: 2025      PT Recommendation and Plan    Visit Dx:    ICD-10-CM ICD-9-CM   1. Nausea and vomiting, unspecified vomiting type  R11.2 787.01   2. Hyponatremia  E87.1 276.1   3. Hypochloremia  E87.8 276.9   4. Hypokalemia  E87.6 276.8   5. Colitis  K52.9 558.9   6. Impaired mobility [Z74.09]  Z74.09 799.89        Outcome Measures       Row Name 25 0800             How much help from another person do you currently need...    Turning from your back to your side while in flat bed without using bedrails? 4  -AH      Moving from lying on back to sitting on the side of a flat bed without bedrails? 4  -AH      Moving to and from a bed to a chair (including a wheelchair)? 3  -AH      Standing up from a chair using your arms (e.g., wheelchair, bedside chair)? 3  -AH      Climbing 3-5 steps with a railing? 3  -AH      To walk in hospital room? 3  -AH      AM-PAC 6 Clicks Score (PT) 20  -         Functional Assessment    Outcome Measure Options AM-PAC 6 Clicks Basic Mobility (PT)  -                User Key  (r) = Recorded By, (t) = Taken By, (c) = Cosigned By      Initials Name Provider Type     Larissa Bartlett, PTA Physical Therapist Assistant                     PT Charges       Row Name 25 0814             Time Calculation    Start Time 0746  -      Stop Time 0810  -      Time Calculation (min) 24 min  -      PT Received On 25  -         Time Calculation- PT    Total Timed Code Minutes- PT 24 minute(s)  -         Timed Charges    90736 - PT Therapeutic Exercise Minutes 10  -      73605 - Gait Training Minutes  14  -         Total Minutes    Timed Charges Total Minutes 24  -       Total Minutes 24  -                User Key  (r) = Recorded By, (t) = Taken By, (c) = Cosigned By      Initials Name  Provider Type     Larissa Bartlett, PTA Physical Therapist Assistant                     PT Rehab Goals       Row Name 07/20/25 1600             Bed Mobility Goal 1 (PT)    Activity/Assistive Device (Bed Mobility Goal 1, PT) sit to supine/supine to sit  -TB      [place] Level/Cues Needed (Bed Mobility Goal 1, PT) independent  -TB      Time Frame (Bed Mobility Goal 1, PT) long term goal (LTG);10 days  -TB      Progress/Outcomes (Bed Mobility Goal 1, PT) goal not met  -TB         Transfer Goal 1 (PT)    Activity/Assistive Device (Transfer Goal 1, PT) sit-to-stand/stand-to-sit;bed-to-chair/chair-to-bed  -TB      [place] Level/Cues Needed (Transfer Goal 1, PT) independent  -TB      Time Frame (Transfer Goal 1, PT) long term goal (LTG);10 days  -TB      Progress/Outcome (Transfer Goal 1, PT) goal not met  -TB         Gait Training Goal 1 (PT)    Activity/Assistive Device (Gait Training Goal 1, PT) gait (walking locomotion);walker, rolling  -TB      [place] Level (Gait Training Goal 1, PT) independent  -TB      Distance (Gait Training Goal 1, PT) 50  -TB      Time Frame (Gait Training Goal 1, PT) 10 days;long term goal (LTG)  -TB      Progress/Outcome (Gait Training Goal 1, PT) goal not met  -TB                User Key  (r) = Recorded By, (t) = Taken By, (c) = Cosigned By      Initials Name Provider Type Discipline    TB Beryl Nicholson, PTA Physical Therapist Assistant PT                        PT Discharge Summary  Anticipated Discharge Disposition (PT): home with assist, skilled nursing facility, home with home health  Reason for Discharge: Discharge from facility  Outcomes Achieved: Refer to plan of care for updates on goals achieved  Discharge Destination: Home      Beryl Nicholson PTA   7/20/2025

## 2025-07-20 NOTE — PROGRESS NOTES
[facility] Intensivist Services  INPATIENT PROGRESS NOTE    Patient Name: Leila Rubin  Date of Admission: 7/16/2025  Today's Date: 07/20/25  Length of Stay: 4  Primary Care Physician: Kaylan Kowalski MD    Subjective     Vomiting     Dehydration  Symptoms: vomiting     74 y.o. female with past medical history of primary HTN, hypothyroidism, HLD, bronchiectasis, depression and GERD, presented to the ED today with severe weakness after 4 days of N&V. Pt was in usual state of health when she became ill with N&V for past 4 days. Has had little to no oral intake. Family believed she was severely dehydrated and deconditioned as she does not eat or drink very much at her baseline. Upon arrival in the ED, pt was found to be severely hyponatremic with a Na+ level of 111. Has never had issues in the past with hyponatremia or N&V. Of note, pt did just start HCTZ as outpatient for her HTN. CT abd/pelvis with contrast indicated areas of thickening in ascending and descending colon, possible colitis vs underdistention. No other signs of infectious process. She did receive a dose of Rocephin in the ED and her K+ was replaced. The intensivist service was contacted by the ED physician to evaluate the patient for admission to critical care. Upon exam in ED, pt alert, no distress. Very hard of hearing and did not have hearing aids in at present. Family member at bedside able to relay information to her. BP and HR stable, no pressors. No oxygenation issues on RA. Pt will be admitted to Critical Care Medicine service for further treatment and investigation into her hyponatremia.   7/19  The patient is doing well this morning. Alert and interactive. No c/o. No vomiting overnight. Patient is feeling well this morning, interactive.   7/20  As before was admitted secondary to hyponatremia with nausea vomiting felt secondary to dehydration and HCTZ her hyponatremia has been resolving feeling better  also CT was showing questionable colitis her symptoms are getting better feeling better overall the patient blood pressure is mildly elevated we will start her on Norvasc avoid hydrochlorothiazide  Objective    Temp:  [97.7 °F (36.5 °C)-98.3 °F (36.8 °C)] 98.3 °F (36.8 °C)  Heart Rate:  [78-97] 82  Resp:  [16] 16  BP: (101-149)/() 149/58  Physical Exam  NAD, AAOx3, hard of hearing  RRR no MRG  CTAB  Soft, NTND, NABS      Results Review:  Lab Results (last 24 hours)       Procedure Component Value Units Date/Time    Magnesium [[id number]]  (Normal) Collected: 07/20/25 0817    Specimen: Blood Updated: 07/20/25 0906     Magnesium 1.8 mg/dL     Basic Metabolic Panel [[id number]]  (Abnormal) Collected: 07/20/25 0817    Specimen: Blood Updated: 07/20/25 0906     Glucose 104 mg/dL      BUN 8.3 mg/dL      Creatinine 0.52 mg/dL      Sodium 128 mmol/L      Potassium 4.0 mmol/L      Chloride 96 mmol/L      CO2 22.0 mmol/L      Calcium 8.9 mg/dL      BUN/Creatinine Ratio 16.0     Anion Gap 10.0 mmol/L      eGFR 96.4 mL/min/1.73     Narrative:      GFR Categories in Chronic Kidney Disease (CKD)              GFR Category          GFR (mL/min/1.73)    Interpretation  G1                    90 or greater        Normal or high (1)  G2                    60-89                Mild decrease (1)  G3a                   45-59                Mild to moderate decrease  G3b                   30-44                Moderate to severe decrease  G4                    15-29                Severe decrease  G5                    14 or less           Kidney failure    (1)In the absence of evidence of kidney disease, neither GFR category G1 or G2 fulfill the criteria for CKD.    eGFR calculation 2021 CKD-EPI creatinine equation, which does not include race as a factor    CBC (No Diff) [[id number]]  (Abnormal) Collected: 07/20/25 0816    Specimen: Blood Updated: 07/20/25 0846     WBC 7.13 10*3/mm3      RBC 3.38 10*6/mm3      Hemoglobin 9.7 g/dL       Hematocrit 29.2 %      MCV 86.4 fL      MCH 28.7 pg      MCHC 33.2 g/dL      RDW 13.6 %      RDW-SD 42.6 fl      MPV 9.4 fL      Platelets 294 10*3/mm3     Sodium [[id number]]  (Abnormal) Collected: 07/19/25 2315    Specimen: Blood Updated: 07/19/25 2357     Sodium 126 mmol/L     Sodium [[id number]]  (Abnormal) Collected: 07/19/25 1705    Specimen: Blood Updated: 07/19/25 1756     Sodium 128 mmol/L     Sodium [[id number]]  (Abnormal) Collected: 07/19/25 1157    Specimen: Blood Updated: 07/19/25 1324     Sodium 128 mmol/L              No radiology results for the last day      Result Review:  I have personally reviewed the results from the time of this admission to 7/20/2025 10:20 CDT and agree with these findings:  [x]  Laboratory list / accordion  []  Microbiology  []  Radiology  []  EKG/Telemetry   []  Cardiology/Vascular   []  Pathology  []  Old records  []  Other:    Assessment/Plan   Assessment  Active Hospital Problems    Diagnosis     **Hyponatremia     Severe protein-calorie malnutrition        Treatment Plan  The patient is a 75yo woman with a history of HTN who had recently been started on HCTZ who was admitted on 7/16 for hypoNa (Na 111 on presentation). The patient has had no neurologic deficits related to her hypoNa, and her Na is correcting appropriately.    HypoNatremia  -Due to some combination of HCTZ, volume contraction from her recent bout of vomiting, and SIADH related to her vomiting.   - Na on admission 111, urine Na 50 and urine osmolarity 272, TSH 2.7 on 5/19/2025  -Na corrected from 111 to 122, so NS was stopped and she was given D5W and desmopressin. Na trended down to 113 7/18.  3% hypertonic saline was initiated 7/18 in am and she received 60 cc x3. With improvement in Na to 116 and 121 7/19. During 7/19 she autocorrected to 128.   PLAN:  -we holding on fluid right now due to autocorrection  - encourage PO intake  - patient can be transferred to the floor  -HCTZ d/c'ed    2. NV  -The patient  had 3-4 days of vomiting prior to admission. No vomiting since admission.  PLAN:  -Follow  - As needed Zofran if needed    3.  Hypertension  -Normotensive on this admission  Plan:  -Discontinue hydrochlorothiazide  - Will address blood pressure medication if becomes hypertensive    4.  Anxiety on this admission  - On home Zoloft  Plan:  - As needed hydroxyzine if needed    5.  COPD, not in acute exacerbation  - Restart home inhaler regiment with Symbicort and montelukast    CCT 35 min    Electronically signed by Kacy Marin MD on 7/20/2025 at 10:20 CDT

## 2025-07-20 NOTE — THERAPY DISCHARGE NOTE
Acute Care - Occupational Therapy Discharge Summary  [facility]     Patient Name: Leila Rubin  : 1950  MRN: [id number]    Today's Date: 2025                 Admit Date: 2025        OT Recommendation and Plan    Visit Dx:    ICD-10-CM ICD-9-CM   1. Nausea and vomiting, unspecified vomiting type  R11.2 787.01   2. Hyponatremia  E87.1 276.1   3. Hypochloremia  E87.8 276.9   4. Hypokalemia  E87.6 276.8   5. Colitis  K52.9 558.9   6. Impaired mobility [Z74.09]  Z74.09 799.89          Time Calculation- OT       Row Name 25 0814             Timed Charges    46057 - Gait Training Minutes  14  -AH         Total Minutes    Timed Charges Total Minutes 14  -AH       Total Minutes 14  -AH                User Key  (r) = Recorded By, (t) = Taken By, (c) = Cosigned By      Initials Name Provider Type    Larissa Gould, PTA Physical Therapist Assistant                       OT Rehab Goals       Row Name 25 1400             Transfer Goal 1 (OT)    Activity/Assistive Device (Transfer Goal 1, OT) bed-to-chair/chair-to-bed;sit-to-stand/stand-to-sit;commode, 3-in-1  -KP      [place] Level/Cues Needed (Transfer Goal 1, OT) standby assist  -KP      Time Frame (Transfer Goal 1, OT) long term goal (LTG);by discharge  -KP      Progress/Outcome (Transfer Goal 1, OT) goal not met  -KP         Dressing Goal 1 (OT)    Activity/Device (Dressing Goal 1, OT) dressing skills, all  -KP      [place]/Cues Needed (Dressing Goal 1, OT) independent  -KP      Time Frame (Dressing Goal 1, OT) long term goal (LTG);by discharge  -KP      Strategies/Barriers (Dressing Goal 1, OT) while sititng EOB  -KP      Progress/Outcome (Dressing Goal 1, OT) goal not met  -KP         Grooming Goal 1 (OT)    Activity/Device (Grooming Goal 1, OT) hair care;oral care;wash face, hands  -KP      [place] (Grooming Goal 1, OT) standby assist  -KP      Time Frame (Grooming Goal 1, OT) long term goal (LTG);by discharge  -KP       Strategies/Barriers (Grooming Goal 1, OT) while in standing position  -      Progress/Outcome (Grooming Goal 1, OT) goal not met  -                User Key  (r) = Recorded By, (t) = Taken By, (c) = Cosigned By      Initials Name Provider Type [facility]    Shara Ley, OTR/L Occupational Therapist OT                     Outcome Measures       Row Name 07/20/25 0800             How much help from another person do you currently need...    Turning from your back to your side while in flat bed without using bedrails? 4  -AH      Moving from lying on back to sitting on the side of a flat bed without bedrails? 4  -AH      Moving to and from a bed to a chair (including a wheelchair)? 3  -AH      Standing up from a chair using your arms (e.g., wheelchair, bedside chair)? 3  -AH      Climbing 3-5 steps with a railing? 3  -AH      To walk in hospital room? 3  -AH      AM-PAC 6 Clicks Score (PT) 20  -         Functional Assessment    Outcome Measure Options AM-PAC 6 Clicks Basic Mobility (PT)  -                User Key  (r) = Recorded By, (t) = Taken By, (c) = Cosigned By      Initials Name Provider Type     Larissa Bartlett, PTA Physical Therapist Assistant                            OT Discharge Summary  Anticipated Discharge Disposition (OT): home with assist  Reason for Discharge: Discharge from facility  Outcomes Achieved: Patient able to partially acheive established goals  Discharge Destination: Home with assist      VILMA Willis/L  7/20/2025

## 2025-07-20 NOTE — PLAN OF CARE
Goal Outcome Evaluation:  Plan of Care Reviewed With: patient, child, family        Progress: improving  Outcome Evaluation: Patient is A+Ox3, following commands but is very verbal today and can get distracted easily. Tolerating new PO medication. Tolerating food and drink without issues. MORAN- up with standby asst and walker. Patient

## 2025-07-20 NOTE — PLAN OF CARE
Goal Outcome Evaluation:  Plan of Care Reviewed With: patient        Progress: improving  Outcome Evaluation: pt trans to EOB cga, BLE AROM, sit-stand cga, toilet trans cga, pt amb 50 feet rwx cga, trans to chair, pt would benefit from cont therapy

## 2025-07-21 ENCOUNTER — READMISSION MANAGEMENT (OUTPATIENT)
Dept: CALL CENTER | Facility: HOSPITAL | Age: 75
End: 2025-07-21
Payer: MEDICARE

## 2025-07-21 ENCOUNTER — TELEPHONE (OUTPATIENT)
Dept: PRIMARY CARE CLINIC | Age: 75
End: 2025-07-21

## 2025-07-21 NOTE — TELEPHONE ENCOUNTER
Care Transitions Initial Follow Up Call    Outreach made within 2 business days of discharge: Yes    Patient: Jazlyn Leyva   Patient : 1950   MRN: [id number]    Reason for Admission: Vomiting  Discharge Date: 25       Spoke with: Jazlyn    Discharge department/facility: [facility] Patient Contact:  Was patient able to fill all prescriptions: Yes  Was patient instructed to bring all medications to the follow-up visit: Yes  Is patient taking all medications as directed in the discharge summary? Yes  Does patient understand their discharge instructions: Yes  Does patient have questions or concerns that need addressed prior to 7-14 day follow up office visit: no    Additional needs identified to be addressed with provider  No needs identified             Scheduled appointment with PCP within 7-14 days    Spoke with Jazlyn and her daughter in law. Jazlyn is feeling much better. Her appetite is good and she is eating well. She has not had any more vomiting. She has no fever or chills. She is having normal bladder and bowel function. She will see Dr. Colindres on .     Follow Up  Future Appointments   Date Time Provider Department Center   2025 12:30 PM Meghan Colindres MD [facility]       Michelle Dorado MA

## 2025-07-21 NOTE — OUTREACH NOTE
Prep Survey      Flowsheet Row Responses   [facility] facility patient discharged from? [facility]   Is LACE score < 7 ? No   Eligibility Readm Mgmt   Discharge diagnosis vomiting   Does the patient have one of the following disease processes/diagnoses(primary or secondary)? Other   Does the patient have Home health ordered? No   Is there a DME ordered? No   Prep survey completed? Yes            WOLFGANG CRAWFORD - Registered Nurse

## 2025-07-21 NOTE — TELEPHONE ENCOUNTER
She has been dc'ed now and has f/u 7/24-- can you call her and be sure she knows Do NOt take the hctz that was added last visit here-- it can cause low sodium - stop hctz and check if she needs anything between now and 7/24- throw away hctz

## 2025-07-24 ENCOUNTER — OFFICE VISIT (OUTPATIENT)
Dept: INTERNAL MEDICINE | Age: 75
End: 2025-07-24

## 2025-07-24 VITALS
HEIGHT: 59 IN | WEIGHT: 84 LBS | DIASTOLIC BLOOD PRESSURE: 52 MMHG | HEART RATE: 80 BPM | SYSTOLIC BLOOD PRESSURE: 118 MMHG | BODY MASS INDEX: 16.93 KG/M2 | OXYGEN SATURATION: 94 %

## 2025-07-24 DIAGNOSIS — D64.9 ANEMIA, UNSPECIFIED TYPE: ICD-10-CM

## 2025-07-24 DIAGNOSIS — R11.2 NAUSEA AND VOMITING, UNSPECIFIED VOMITING TYPE: ICD-10-CM

## 2025-07-24 DIAGNOSIS — E87.1 HYPONATREMIA: ICD-10-CM

## 2025-07-24 DIAGNOSIS — E03.9 ACQUIRED HYPOTHYROIDISM: ICD-10-CM

## 2025-07-24 DIAGNOSIS — E87.1 HYPONATREMIA: Primary | ICD-10-CM

## 2025-07-24 DIAGNOSIS — I10 PRIMARY HYPERTENSION: ICD-10-CM

## 2025-07-24 LAB
ALBUMIN SERPL-MCNC: 4.7 G/DL (ref 3.5–5.2)
ALP SERPL-CCNC: 66 U/L (ref 35–104)
ALT SERPL-CCNC: 14 U/L (ref 10–35)
ANION GAP SERPL CALCULATED.3IONS-SCNC: 13 MMOL/L (ref 8–16)
AST SERPL-CCNC: 30 U/L (ref 10–35)
BILIRUB SERPL-MCNC: 0.4 MG/DL (ref 0.2–1.2)
BUN SERPL-MCNC: 9 MG/DL (ref 8–23)
CALCIUM SERPL-MCNC: 10 MG/DL (ref 8.8–10.2)
CHLORIDE SERPL-SCNC: 93 MMOL/L (ref 98–107)
CO2 SERPL-SCNC: 25 MMOL/L (ref 22–29)
CREAT SERPL-MCNC: 0.8 MG/DL (ref 0.5–0.9)
ERYTHROCYTE [DISTWIDTH] IN BLOOD BY AUTOMATED COUNT: 13.6 % (ref 11.5–14.5)
GLUCOSE SERPL-MCNC: 116 MG/DL (ref 70–99)
HCT VFR BLD AUTO: 32.4 % (ref 37–47)
HGB BLD-MCNC: 10.6 G/DL (ref 12–16)
MCH RBC QN AUTO: 28.9 PG (ref 27–31)
MCHC RBC AUTO-ENTMCNC: 32.7 G/DL (ref 33–37)
MCV RBC AUTO: 88.3 FL (ref 81–99)
PLATELET # BLD AUTO: 356 K/UL (ref 130–400)
PMV BLD AUTO: 9.3 FL (ref 9.4–12.3)
POTASSIUM SERPL-SCNC: 5.3 MMOL/L (ref 3.5–5.1)
PROT SERPL-MCNC: 8 G/DL (ref 6.4–8.3)
RBC # BLD AUTO: 3.67 M/UL (ref 4.2–5.4)
SODIUM SERPL-SCNC: 131 MMOL/L (ref 136–145)
TSH SERPL DL<=0.005 MIU/L-ACNC: 5.86 UIU/ML (ref 0.27–4.2)
WBC # BLD AUTO: 8.5 K/UL (ref 4.8–10.8)

## 2025-07-24 RX ORDER — AMLODIPINE BESYLATE 10 MG/1
10 TABLET ORAL DAILY
COMMUNITY
Start: 2025-07-21

## 2025-07-25 ENCOUNTER — TELEPHONE (OUTPATIENT)
Dept: INTERNAL MEDICINE | Age: 75
End: 2025-07-25

## 2025-07-25 NOTE — TELEPHONE ENCOUNTER
About an hour after giving blood yesterday, she got lightheaded and was shaking  - He son also said that she complains of left shoulder pain/discomfort at times.   Asking what lab results showed?

## 2025-07-25 NOTE — TELEPHONE ENCOUNTER
Labs were much better - the sodium was 131 -- let us know if more issues- her thyroid is ok- low normal but ok and we will follow up on that   Anemia is stable

## 2025-07-25 NOTE — TELEPHONE ENCOUNTER
Inotified her son and she has not had any more weak spells and probably over did it that day without much to eat.

## 2025-08-05 PROBLEM — I10 PRIMARY HYPERTENSION: Status: ACTIVE | Noted: 2025-08-05

## 2025-08-12 ENCOUNTER — TRANSCRIBE ORDERS (OUTPATIENT)
Dept: ADMINISTRATIVE | Facility: HOSPITAL | Age: 75
End: 2025-08-12
Payer: MEDICARE

## 2025-08-12 ENCOUNTER — OFFICE VISIT (OUTPATIENT)
Dept: INTERNAL MEDICINE | Age: 75
End: 2025-08-12

## 2025-08-12 VITALS
HEIGHT: 59 IN | DIASTOLIC BLOOD PRESSURE: 60 MMHG | OXYGEN SATURATION: 96 % | BODY MASS INDEX: 16.33 KG/M2 | WEIGHT: 81 LBS | SYSTOLIC BLOOD PRESSURE: 120 MMHG | HEART RATE: 80 BPM

## 2025-08-12 DIAGNOSIS — R91.1 LUNG NODULE: ICD-10-CM

## 2025-08-12 DIAGNOSIS — J47.9 BRONCHIECTASIS WITHOUT COMPLICATION (HCC): ICD-10-CM

## 2025-08-12 DIAGNOSIS — R63.6 UNDERWEIGHT: ICD-10-CM

## 2025-08-12 DIAGNOSIS — E03.9 ACQUIRED HYPOTHYROIDISM: ICD-10-CM

## 2025-08-12 DIAGNOSIS — E55.9 VITAMIN D DEFICIENCY: ICD-10-CM

## 2025-08-12 DIAGNOSIS — R91.1 LUNG NODULE: Primary | ICD-10-CM

## 2025-08-12 DIAGNOSIS — E87.1 HYPONATREMIA: Primary | ICD-10-CM

## 2025-08-12 DIAGNOSIS — I10 PRIMARY HYPERTENSION: ICD-10-CM

## 2025-08-12 DIAGNOSIS — D51.8 OTHER VITAMIN B12 DEFICIENCY ANEMIA: ICD-10-CM

## 2025-08-21 PROBLEM — R63.6 UNDERWEIGHT: Status: ACTIVE | Noted: 2025-08-21

## 2025-08-21 PROBLEM — E87.1 HYPONATREMIA: Status: ACTIVE | Noted: 2025-08-21

## 2025-08-21 RX ORDER — AMLODIPINE BESYLATE 10 MG/1
10 TABLET ORAL DAILY
Qty: 30 TABLET | Refills: 3 | Status: SHIPPED | OUTPATIENT
Start: 2025-08-21